# Patient Record
Sex: FEMALE | Race: WHITE | NOT HISPANIC OR LATINO | Employment: UNEMPLOYED | URBAN - METROPOLITAN AREA
[De-identification: names, ages, dates, MRNs, and addresses within clinical notes are randomized per-mention and may not be internally consistent; named-entity substitution may affect disease eponyms.]

---

## 2019-01-10 ENCOUNTER — APPOINTMENT (EMERGENCY)
Dept: RADIOLOGY | Facility: HOSPITAL | Age: 64
DRG: 871 | End: 2019-01-10
Payer: COMMERCIAL

## 2019-01-10 ENCOUNTER — APPOINTMENT (EMERGENCY)
Dept: CT IMAGING | Facility: HOSPITAL | Age: 64
DRG: 871 | End: 2019-01-10
Payer: COMMERCIAL

## 2019-01-10 ENCOUNTER — HOSPITAL ENCOUNTER (INPATIENT)
Facility: HOSPITAL | Age: 64
LOS: 6 days | Discharge: HOME | DRG: 871 | End: 2019-01-17
Attending: EMERGENCY MEDICINE | Admitting: INTERNAL MEDICINE
Payer: COMMERCIAL

## 2019-01-10 DIAGNOSIS — J18.9 MULTIFOCAL PNEUMONIA: Primary | ICD-10-CM

## 2019-01-10 DIAGNOSIS — R06.03 RESPIRATORY DISTRESS: ICD-10-CM

## 2019-01-10 DIAGNOSIS — I10 ESSENTIAL HYPERTENSION: ICD-10-CM

## 2019-01-10 DIAGNOSIS — D72.825 BANDEMIA: ICD-10-CM

## 2019-01-10 DIAGNOSIS — E87.2 LACTIC ACIDOSIS: ICD-10-CM

## 2019-01-10 LAB
ALBUMIN SERPL BCP-MCNC: 2.3 G/DL (ref 3.5–5)
ALP SERPL-CCNC: 100 U/L (ref 46–116)
ALT SERPL W P-5'-P-CCNC: 21 U/L (ref 12–78)
ANION GAP BLD CALC-SCNC: 16 MMOL/L (ref 4–13)
ANION GAP SERPL CALCULATED.3IONS-SCNC: 18 MMOL/L (ref 4–13)
ANISOCYTOSIS BLD QL SMEAR: PRESENT
APTT PPP: 27 SECONDS (ref 26–38)
AST SERPL W P-5'-P-CCNC: 20 U/L (ref 5–45)
BASE EX.OXY STD BLDV CALC-SCNC: 84.6 % (ref 60–80)
BASE EXCESS BLDV CALC-SCNC: -3.2 MMOL/L
BASOPHILS # BLD MANUAL: 0 THOUSAND/UL (ref 0–0.1)
BASOPHILS NFR MAR MANUAL: 0 % (ref 0–1)
BILIRUB SERPL-MCNC: 0.81 MG/DL (ref 0.2–1)
BUN BLD-MCNC: 42 MG/DL (ref 5–25)
BUN SERPL-MCNC: 47 MG/DL (ref 5–25)
CA-I BLD-SCNC: 1.21 MMOL/L (ref 1.12–1.32)
CALCIUM SERPL-MCNC: 10.5 MG/DL (ref 8.3–10.1)
CHLORIDE BLD-SCNC: 110 MMOL/L (ref 100–108)
CHLORIDE SERPL-SCNC: 100 MMOL/L (ref 100–108)
CO2 SERPL-SCNC: 22 MMOL/L (ref 21–32)
CREAT BLD-MCNC: 1.1 MG/DL (ref 0.6–1.3)
CREAT SERPL-MCNC: 1.41 MG/DL (ref 0.6–1.3)
EOSINOPHIL # BLD MANUAL: 0 THOUSAND/UL (ref 0–0.4)
EOSINOPHIL NFR BLD MANUAL: 0 % (ref 0–6)
ERYTHROCYTE [DISTWIDTH] IN BLOOD BY AUTOMATED COUNT: 15.1 % (ref 11.6–15.1)
GFR SERPL CREATININE-BSD FRML MDRD: 39 ML/MIN/1.73SQ M
GFR SERPL CREATININE-BSD FRML MDRD: 53 ML/MIN/1.73SQ M
GLUCOSE SERPL-MCNC: 249 MG/DL (ref 65–140)
GLUCOSE SERPL-MCNC: 259 MG/DL (ref 65–140)
HCO3 BLDV-SCNC: 21.8 MMOL/L (ref 24–30)
HCT VFR BLD AUTO: 35.5 % (ref 34.8–46.1)
HCT VFR BLD CALC: 31 % (ref 34.8–46.1)
HGB BLD-MCNC: 10.7 G/DL (ref 11.5–15.4)
HGB BLDA-MCNC: 10.5 G/DL (ref 11.5–15.4)
INR PPP: 1.25 (ref 0.86–1.17)
LACTATE SERPL-SCNC: 5.2 MMOL/L (ref 0.5–2)
LYMPHOCYTES # BLD AUTO: 0.19 THOUSAND/UL (ref 0.6–4.47)
LYMPHOCYTES # BLD AUTO: 4 % (ref 14–44)
MCH RBC QN AUTO: 32.6 PG (ref 26.8–34.3)
MCHC RBC AUTO-ENTMCNC: 30.1 G/DL (ref 31.4–37.4)
MCV RBC AUTO: 108 FL (ref 82–98)
METAMYELOCYTES NFR BLD MANUAL: 1 % (ref 0–1)
MONOCYTES # BLD AUTO: 1.11 THOUSAND/UL (ref 0–1.22)
MONOCYTES NFR BLD: 23 % (ref 4–12)
MYELOCYTES NFR BLD MANUAL: 2 % (ref 0–1)
NEUTROPHILS # BLD MANUAL: 3.37 THOUSAND/UL (ref 1.85–7.62)
NEUTS BAND NFR BLD MANUAL: 16 % (ref 0–8)
NEUTS SEG NFR BLD AUTO: 54 % (ref 43–75)
NRBC BLD AUTO-RTO: 0 /100 WBCS
NRBC BLD AUTO-RTO: 2 /100 WBC (ref 0–2)
O2 CT BLDV-SCNC: 13.3 ML/DL
PCO2 BLD: 24 MMOL/L (ref 21–32)
PCO2 BLDV: 38.9 MM HG (ref 42–50)
PH BLDV: 7.37 [PH] (ref 7.3–7.4)
PLATELET # BLD AUTO: 29 THOUSANDS/UL (ref 149–390)
PLATELET BLD QL SMEAR: ABNORMAL
PMV BLD AUTO: 11 FL (ref 8.9–12.7)
PO2 BLDV: 57 MM HG (ref 35–45)
POTASSIUM BLD-SCNC: 4.2 MMOL/L (ref 3.5–5.3)
POTASSIUM SERPL-SCNC: 4.2 MMOL/L (ref 3.5–5.3)
PROT SERPL-MCNC: 7.4 G/DL (ref 6.4–8.2)
PROTHROMBIN TIME: 15.8 SECONDS (ref 11.8–14.2)
RBC # BLD AUTO: 3.28 MILLION/UL (ref 3.81–5.12)
SODIUM BLD-SCNC: 146 MMOL/L (ref 136–145)
SODIUM SERPL-SCNC: 140 MMOL/L (ref 136–145)
SPECIMEN SOURCE: ABNORMAL
TOTAL CELLS COUNTED SPEC: 100
TOXIC GRANULES BLD QL SMEAR: PRESENT
TROPONIN I SERPL-MCNC: <0.02 NG/ML
WBC # BLD AUTO: 4.82 THOUSAND/UL (ref 4.31–10.16)

## 2019-01-10 PROCEDURE — 85007 BL SMEAR W/DIFF WBC COUNT: CPT | Performed by: EMERGENCY MEDICINE

## 2019-01-10 PROCEDURE — 83605 ASSAY OF LACTIC ACID: CPT | Performed by: EMERGENCY MEDICINE

## 2019-01-10 PROCEDURE — 81001 URINALYSIS AUTO W/SCOPE: CPT | Performed by: EMERGENCY MEDICINE

## 2019-01-10 PROCEDURE — 71275 CT ANGIOGRAPHY CHEST: CPT

## 2019-01-10 PROCEDURE — 71045 X-RAY EXAM CHEST 1 VIEW: CPT

## 2019-01-10 PROCEDURE — 36415 COLL VENOUS BLD VENIPUNCTURE: CPT

## 2019-01-10 PROCEDURE — 87040 BLOOD CULTURE FOR BACTERIA: CPT | Performed by: EMERGENCY MEDICINE

## 2019-01-10 PROCEDURE — 80047 BASIC METABLC PNL IONIZED CA: CPT

## 2019-01-10 PROCEDURE — 94660 CPAP INITIATION&MGMT: CPT

## 2019-01-10 PROCEDURE — 96361 HYDRATE IV INFUSION ADD-ON: CPT

## 2019-01-10 PROCEDURE — 84484 ASSAY OF TROPONIN QUANT: CPT | Performed by: EMERGENCY MEDICINE

## 2019-01-10 PROCEDURE — 96365 THER/PROPH/DIAG IV INF INIT: CPT

## 2019-01-10 PROCEDURE — 85610 PROTHROMBIN TIME: CPT | Performed by: EMERGENCY MEDICINE

## 2019-01-10 PROCEDURE — 80053 COMPREHEN METABOLIC PANEL: CPT | Performed by: EMERGENCY MEDICINE

## 2019-01-10 PROCEDURE — 82805 BLOOD GASES W/O2 SATURATION: CPT | Performed by: EMERGENCY MEDICINE

## 2019-01-10 PROCEDURE — 85014 HEMATOCRIT: CPT

## 2019-01-10 PROCEDURE — 85730 THROMBOPLASTIN TIME PARTIAL: CPT | Performed by: EMERGENCY MEDICINE

## 2019-01-10 PROCEDURE — 99285 EMERGENCY DEPT VISIT HI MDM: CPT

## 2019-01-10 PROCEDURE — 85027 COMPLETE CBC AUTOMATED: CPT | Performed by: EMERGENCY MEDICINE

## 2019-01-10 PROCEDURE — 94640 AIRWAY INHALATION TREATMENT: CPT

## 2019-01-10 PROCEDURE — 93005 ELECTROCARDIOGRAM TRACING: CPT

## 2019-01-10 RX ORDER — FLUTICASONE FUROATE AND VILANTEROL 100; 25 UG/1; UG/1
1 POWDER RESPIRATORY (INHALATION) DAILY
COMMUNITY

## 2019-01-10 RX ORDER — PROCHLORPERAZINE MALEATE 10 MG
10 TABLET ORAL EVERY 6 HOURS PRN
COMMUNITY
End: 2022-05-13

## 2019-01-10 RX ORDER — GABAPENTIN 300 MG/1
100 CAPSULE ORAL 2 TIMES DAILY
COMMUNITY

## 2019-01-10 RX ORDER — POTASSIUM CHLORIDE 750 MG/1
20 TABLET, EXTENDED RELEASE ORAL 2 TIMES DAILY
COMMUNITY

## 2019-01-10 RX ORDER — ACETAMINOPHEN 500 MG
500 TABLET ORAL EVERY 6 HOURS PRN
COMMUNITY

## 2019-01-10 RX ORDER — MORPHINE SULFATE 15 MG/1
15 TABLET, FILM COATED, EXTENDED RELEASE ORAL EVERY 4 HOURS PRN
Status: ON HOLD | COMMUNITY
End: 2022-05-13 | Stop reason: SDUPTHER

## 2019-01-10 RX ORDER — ASPIRIN 81 MG/1
81 TABLET, CHEWABLE ORAL DAILY
COMMUNITY
End: 2022-05-13

## 2019-01-10 RX ORDER — FLUTICASONE PROPIONATE 50 MCG
2 SPRAY, SUSPENSION (ML) NASAL DAILY
COMMUNITY

## 2019-01-10 RX ORDER — MIRTAZAPINE 15 MG/1
15 TABLET, FILM COATED ORAL
COMMUNITY
End: 2019-01-17 | Stop reason: HOSPADM

## 2019-01-10 RX ORDER — OMEPRAZOLE 20 MG/1
20 CAPSULE, DELAYED RELEASE ORAL DAILY
COMMUNITY

## 2019-01-10 RX ORDER — MORPHINE SULFATE 30 MG/1
30 TABLET ORAL 2 TIMES DAILY
COMMUNITY
End: 2019-01-17 | Stop reason: HOSPADM

## 2019-01-10 RX ORDER — MULTIVITAMIN
1 TABLET ORAL DAILY
COMMUNITY

## 2019-01-10 RX ORDER — ALBUTEROL SULFATE 2.5 MG/3ML
5 SOLUTION RESPIRATORY (INHALATION) ONCE
Status: COMPLETED | OUTPATIENT
Start: 2019-01-10 | End: 2019-01-10

## 2019-01-10 RX ORDER — LORAZEPAM 0.5 MG/1
0.5 TABLET ORAL EVERY 6 HOURS PRN
COMMUNITY
End: 2022-05-13

## 2019-01-10 RX ORDER — ACYCLOVIR 400 MG/1
400 TABLET ORAL
COMMUNITY

## 2019-01-10 RX ORDER — CHLORHEXIDINE GLUCONATE 0.12 MG/ML
15 RINSE ORAL 2 TIMES DAILY
COMMUNITY
End: 2022-05-13

## 2019-01-10 RX ORDER — ASCORBIC ACID 500 MG
500 TABLET ORAL DAILY
COMMUNITY

## 2019-01-10 RX ORDER — METOPROLOL SUCCINATE 100 MG/1
100 TABLET, EXTENDED RELEASE ORAL DAILY
COMMUNITY
End: 2019-01-17 | Stop reason: HOSPADM

## 2019-01-10 RX ADMIN — IPRATROPIUM BROMIDE 0.5 MG: 0.5 SOLUTION RESPIRATORY (INHALATION) at 22:01

## 2019-01-10 RX ADMIN — ALBUTEROL SULFATE 5 MG: 2.5 SOLUTION RESPIRATORY (INHALATION) at 22:01

## 2019-01-10 RX ADMIN — SODIUM CHLORIDE 1000 ML: 0.9 INJECTION, SOLUTION INTRAVENOUS at 22:31

## 2019-01-10 RX ADMIN — SODIUM CHLORIDE 1000 ML: 0.9 INJECTION, SOLUTION INTRAVENOUS at 22:32

## 2019-01-10 RX ADMIN — VANCOMYCIN HYDROCHLORIDE 1250 MG: 1 INJECTION, POWDER, LYOPHILIZED, FOR SOLUTION INTRAVENOUS at 23:08

## 2019-01-10 RX ADMIN — SODIUM CHLORIDE 500 ML: 0.9 INJECTION, SOLUTION INTRAVENOUS at 22:01

## 2019-01-10 RX ADMIN — IODIXANOL 85 ML: 320 INJECTION, SOLUTION INTRAVASCULAR at 22:18

## 2019-01-10 RX ADMIN — CEFEPIME HYDROCHLORIDE 2000 MG: 2 INJECTION, POWDER, FOR SOLUTION INTRAVENOUS at 22:33

## 2019-01-11 PROBLEM — E87.2 LACTIC ACIDOSIS: Status: ACTIVE | Noted: 2019-01-11

## 2019-01-11 PROBLEM — C90.00 MULTIPLE MYELOMA (HCC): Status: ACTIVE | Noted: 2019-01-11

## 2019-01-11 PROBLEM — D69.6 THROMBOCYTOPENIA (HCC): Status: ACTIVE | Noted: 2019-01-11

## 2019-01-11 PROBLEM — J18.9 MULTIFOCAL PNEUMONIA: Status: ACTIVE | Noted: 2019-01-11

## 2019-01-11 PROBLEM — J45.909 ASTHMA: Status: ACTIVE | Noted: 2019-01-11

## 2019-01-11 PROBLEM — K21.9 GERD (GASTROESOPHAGEAL REFLUX DISEASE): Status: ACTIVE | Noted: 2019-01-11

## 2019-01-11 PROBLEM — I10 HYPERTENSION: Status: ACTIVE | Noted: 2019-01-11

## 2019-01-11 LAB
ANION GAP SERPL CALCULATED.3IONS-SCNC: 10 MMOL/L (ref 4–13)
BACTERIA UR QL AUTO: ABNORMAL /HPF
BILIRUB UR QL STRIP: NEGATIVE
BUN SERPL-MCNC: 38 MG/DL (ref 5–25)
CALCIUM SERPL-MCNC: 9.3 MG/DL (ref 8.3–10.1)
CHLORIDE SERPL-SCNC: 107 MMOL/L (ref 100–108)
CLARITY UR: ABNORMAL
CO2 SERPL-SCNC: 25 MMOL/L (ref 21–32)
COLOR UR: YELLOW
CREAT SERPL-MCNC: 0.97 MG/DL (ref 0.6–1.3)
ERYTHROCYTE [DISTWIDTH] IN BLOOD BY AUTOMATED COUNT: 15.1 % (ref 11.6–15.1)
FLUAV AG SPEC QL IA: NEGATIVE
FLUAV AG SPEC QL: NORMAL
FLUBV AG SPEC QL IA: NEGATIVE
FLUBV AG SPEC QL: NORMAL
GFR SERPL CREATININE-BSD FRML MDRD: 62 ML/MIN/1.73SQ M
GLUCOSE SERPL-MCNC: 165 MG/DL (ref 65–140)
GLUCOSE UR STRIP-MCNC: NEGATIVE MG/DL
HCT VFR BLD AUTO: 29.2 % (ref 34.8–46.1)
HGB BLD-MCNC: 8.8 G/DL (ref 11.5–15.4)
HGB UR QL STRIP.AUTO: ABNORMAL
KETONES UR STRIP-MCNC: ABNORMAL MG/DL
LACTATE SERPL-SCNC: 1.5 MMOL/L (ref 0.5–2)
LEUKOCYTE ESTERASE UR QL STRIP: NEGATIVE
MCH RBC QN AUTO: 33 PG (ref 26.8–34.3)
MCHC RBC AUTO-ENTMCNC: 30.1 G/DL (ref 31.4–37.4)
MCV RBC AUTO: 109 FL (ref 82–98)
NITRITE UR QL STRIP: NEGATIVE
NON-SQ EPI CELLS URNS QL MICRO: ABNORMAL /HPF
PH UR STRIP.AUTO: 5.5 [PH] (ref 4.5–8)
PLATELET # BLD AUTO: 21 THOUSANDS/UL (ref 149–390)
POTASSIUM SERPL-SCNC: 3.9 MMOL/L (ref 3.5–5.3)
PROT UR STRIP-MCNC: ABNORMAL MG/DL
RBC # BLD AUTO: 2.67 MILLION/UL (ref 3.81–5.12)
RBC #/AREA URNS AUTO: ABNORMAL /HPF
RSV B RNA SPEC QL NAA+PROBE: NORMAL
SODIUM SERPL-SCNC: 142 MMOL/L (ref 136–145)
SP GR UR STRIP.AUTO: 1.02 (ref 1–1.03)
UROBILINOGEN UR QL STRIP.AUTO: 0.2 E.U./DL
WBC # BLD AUTO: 5.76 THOUSAND/UL (ref 4.31–10.16)
WBC #/AREA URNS AUTO: ABNORMAL /HPF

## 2019-01-11 PROCEDURE — 87631 RESP VIRUS 3-5 TARGETS: CPT | Performed by: EMERGENCY MEDICINE

## 2019-01-11 PROCEDURE — 99223 1ST HOSP IP/OBS HIGH 75: CPT | Performed by: INTERNAL MEDICINE

## 2019-01-11 PROCEDURE — 80048 BASIC METABOLIC PNL TOTAL CA: CPT | Performed by: PHYSICIAN ASSISTANT

## 2019-01-11 PROCEDURE — 85027 COMPLETE CBC AUTOMATED: CPT | Performed by: PHYSICIAN ASSISTANT

## 2019-01-11 RX ORDER — METOPROLOL SUCCINATE 100 MG/1
100 TABLET, EXTENDED RELEASE ORAL DAILY
Status: DISCONTINUED | OUTPATIENT
Start: 2019-01-11 | End: 2019-01-17

## 2019-01-11 RX ORDER — GABAPENTIN 300 MG/1
300 CAPSULE ORAL DAILY
Status: DISCONTINUED | OUTPATIENT
Start: 2019-01-11 | End: 2019-01-17 | Stop reason: HOSPADM

## 2019-01-11 RX ORDER — ALBUTEROL SULFATE 2.5 MG/3ML
2.5 SOLUTION RESPIRATORY (INHALATION) EVERY 6 HOURS PRN
Status: DISCONTINUED | OUTPATIENT
Start: 2019-01-11 | End: 2019-01-17 | Stop reason: HOSPADM

## 2019-01-11 RX ORDER — FLUTICASONE PROPIONATE 50 MCG
2 SPRAY, SUSPENSION (ML) NASAL DAILY
Status: DISCONTINUED | OUTPATIENT
Start: 2019-01-11 | End: 2019-01-17 | Stop reason: HOSPADM

## 2019-01-11 RX ORDER — MORPHINE SULFATE 30 MG/1
30 TABLET, FILM COATED, EXTENDED RELEASE ORAL 2 TIMES DAILY
Status: DISCONTINUED | OUTPATIENT
Start: 2019-01-11 | End: 2019-01-12

## 2019-01-11 RX ORDER — LORAZEPAM 0.5 MG/1
0.5 TABLET ORAL EVERY 6 HOURS PRN
Status: DISCONTINUED | OUTPATIENT
Start: 2019-01-11 | End: 2019-01-12

## 2019-01-11 RX ORDER — ACETAMINOPHEN 325 MG/1
650 TABLET ORAL EVERY 6 HOURS PRN
Status: DISCONTINUED | OUTPATIENT
Start: 2019-01-11 | End: 2019-01-17 | Stop reason: HOSPADM

## 2019-01-11 RX ORDER — SENNOSIDES 8.6 MG
45 TABLET ORAL DAILY
Status: DISCONTINUED | OUTPATIENT
Start: 2019-01-11 | End: 2019-01-17 | Stop reason: HOSPADM

## 2019-01-11 RX ORDER — ASPIRIN 81 MG/1
81 TABLET, CHEWABLE ORAL DAILY
Status: DISCONTINUED | OUTPATIENT
Start: 2019-01-11 | End: 2019-01-15

## 2019-01-11 RX ORDER — MIRTAZAPINE 15 MG/1
15 TABLET, FILM COATED ORAL
Status: DISCONTINUED | OUTPATIENT
Start: 2019-01-11 | End: 2019-01-12

## 2019-01-11 RX ORDER — PANTOPRAZOLE SODIUM 40 MG/1
40 TABLET, DELAYED RELEASE ORAL
Status: DISCONTINUED | OUTPATIENT
Start: 2019-01-11 | End: 2019-01-17 | Stop reason: HOSPADM

## 2019-01-11 RX ORDER — ONDANSETRON 2 MG/ML
4 INJECTION INTRAMUSCULAR; INTRAVENOUS EVERY 6 HOURS PRN
Status: DISCONTINUED | OUTPATIENT
Start: 2019-01-11 | End: 2019-01-17 | Stop reason: HOSPADM

## 2019-01-11 RX ORDER — SODIUM CHLORIDE 9 MG/ML
50 INJECTION, SOLUTION INTRAVENOUS CONTINUOUS
Status: DISCONTINUED | OUTPATIENT
Start: 2019-01-11 | End: 2019-01-15

## 2019-01-11 RX ORDER — MORPHINE SULFATE 15 MG/1
15 TABLET ORAL EVERY 4 HOURS PRN
Status: DISCONTINUED | OUTPATIENT
Start: 2019-01-11 | End: 2019-01-12

## 2019-01-11 RX ORDER — ACYCLOVIR 200 MG/1
400 CAPSULE ORAL DAILY
Status: DISCONTINUED | OUTPATIENT
Start: 2019-01-11 | End: 2019-01-17 | Stop reason: HOSPADM

## 2019-01-11 RX ORDER — AMLODIPINE BESYLATE 2.5 MG/1
2.5 TABLET ORAL DAILY
Status: DISCONTINUED | OUTPATIENT
Start: 2019-01-11 | End: 2019-01-17

## 2019-01-11 RX ORDER — POTASSIUM CHLORIDE 20 MEQ/1
20 TABLET, EXTENDED RELEASE ORAL 2 TIMES DAILY
Status: DISCONTINUED | OUTPATIENT
Start: 2019-01-11 | End: 2019-01-14

## 2019-01-11 RX ORDER — CHLORHEXIDINE GLUCONATE 0.12 MG/ML
15 RINSE ORAL 2 TIMES DAILY
Status: DISCONTINUED | OUTPATIENT
Start: 2019-01-11 | End: 2019-01-17 | Stop reason: HOSPADM

## 2019-01-11 RX ADMIN — CEFEPIME HYDROCHLORIDE 2000 MG: 2 INJECTION, POWDER, FOR SOLUTION INTRAVENOUS at 11:05

## 2019-01-11 RX ADMIN — ACYCLOVIR 400 MG: 200 CAPSULE ORAL at 08:52

## 2019-01-11 RX ADMIN — PANTOPRAZOLE SODIUM 40 MG: 40 TABLET, DELAYED RELEASE ORAL at 05:18

## 2019-01-11 RX ADMIN — SODIUM CHLORIDE 100 ML/HR: 0.9 INJECTION, SOLUTION INTRAVENOUS at 03:03

## 2019-01-11 RX ADMIN — AMLODIPINE BESYLATE 2.5 MG: 2.5 TABLET ORAL at 08:52

## 2019-01-11 RX ADMIN — SODIUM CHLORIDE 100 ML/HR: 0.9 INJECTION, SOLUTION INTRAVENOUS at 21:59

## 2019-01-11 RX ADMIN — ACETAMINOPHEN 650 MG: 325 TABLET, FILM COATED ORAL at 05:18

## 2019-01-11 RX ADMIN — MORPHINE SULFATE 15 MG: 15 TABLET ORAL at 03:10

## 2019-01-11 RX ADMIN — METOPROLOL SUCCINATE 100 MG: 100 TABLET, EXTENDED RELEASE ORAL at 08:53

## 2019-01-11 RX ADMIN — CHLORHEXIDINE GLUCONATE 0.12% ORAL RINSE 15 ML: 1.2 LIQUID ORAL at 18:00

## 2019-01-11 RX ADMIN — MORPHINE SULFATE 30 MG: 30 TABLET, EXTENDED RELEASE ORAL at 18:00

## 2019-01-11 RX ADMIN — GABAPENTIN 300 MG: 300 CAPSULE ORAL at 08:52

## 2019-01-11 RX ADMIN — CHLORHEXIDINE GLUCONATE 0.12% ORAL RINSE 15 ML: 1.2 LIQUID ORAL at 08:53

## 2019-01-11 RX ADMIN — CEFEPIME HYDROCHLORIDE 2000 MG: 2 INJECTION, POWDER, FOR SOLUTION INTRAVENOUS at 23:27

## 2019-01-11 RX ADMIN — MORPHINE SULFATE 30 MG: 30 TABLET, EXTENDED RELEASE ORAL at 08:51

## 2019-01-11 RX ADMIN — MORPHINE SULFATE 15 MG: 15 TABLET ORAL at 13:04

## 2019-01-11 NOTE — ED PROCEDURE NOTE
PROCEDURE  CriticalCare Time  Performed by: Nati Lindquist by: Brynn Busby     Critical care provider statement:     Critical care time (minutes):  35    Critical care time was exclusive of:  Separately billable procedures and treating other patients and teaching time    Critical care was necessary to treat or prevent imminent or life-threatening deterioration of the following conditions:  Respiratory failure, cardiac failure and circulatory failure    Critical care was time spent personally by me on the following activities:  Blood draw for specimens, obtaining history from patient or surrogate, development of treatment plan with patient or surrogate, discussions with consultants, evaluation of patient's response to treatment, examination of patient, interpretation of cardiac output measurements, ordering and performing treatments and interventions, ordering and review of laboratory studies, ordering and review of radiographic studies, re-evaluation of patient's condition and review of silas Escobedo MD  01/10/19 5396

## 2019-01-11 NOTE — ED PROVIDER NOTES
History  Chief Complaint   Patient presents with    Shortness of Breath     Pt c/o SOB and then collapsed at home  EMS stated O2 was 62% on arrival  Pt presented with tachypnea on CPAP     Patient comes in apparently had shortness of breath; collapse outside of the home was found to be hypoxic according to the pre-hospital personnel to the 60s  On arrival saturation was in the 70s  Came in on CPAP immediately transitioned to BiPAP  Was noted that patient was having fevers; she denies this  Here she denies any history of CHF for COPD although she was a smoker  Currently undergoing treatment for multiple myeloma it appears most records are through [facility]  She denies any abdominal pain; she can move all extremities nonfocally  Patient answer questions with short sentences and also gives thumbs up and down  No evidence of rash patient is tachypneic however saturation improved on BiPAP  Patient is not on any anticoagulation  Prior to Admission Medications   Prescriptions Last Dose Informant Patient Reported? Taking?    BIOTIN PO Not Taking at Unknown time  Yes No   Sig: Take 5,000 mcg by mouth 2 (two) times a day   Bioflavonoid Products (BIOFLEX PO) Not Taking at Unknown time  Yes No   Sig: Take 1 tablet by mouth daily   CALCIUM CARBONATE-VIT D-MIN PO Not Taking at Unknown time  Yes No   Sig: Take 1 tablet by mouth daily   Calcium Carb-Cholecalciferol (OS-ANTONIETA PO) Not Taking at Unknown time  Yes No   Sig: Take 1 tablet by mouth daily   Dexamethasone (DECADRON PO) Past Week at Unknown time  Yes Yes   Sig: Take by mouth   LORazepam (ATIVAN) 0 5 mg tablet Not Taking at Unknown time  Yes No   Sig: Take 0 5 mg by mouth every 6 (six) hours as needed for anxiety   Multiple Vitamin (MULTIVITAMIN) tablet 1/10/2019 at Unknown time  Yes Yes   Sig: Take 1 tablet by mouth daily   SENNOSIDES PO   Yes Yes   Sig: Take 45 mg by mouth daily   acetaminophen (TYLENOL) 500 mg tablet 1/10/2019 at Unknown time  Yes Yes   Sig: Take 500 mg by mouth every 6 (six) hours as needed for mild pain   acyclovir (ZOVIRAX) 400 MG tablet 1/10/2019 at Unknown time  Yes Yes   Sig: Take 400 mg by mouth every 4 (four) hours while awake   ascorbic acid (VITAMIN C) 500 mg tablet 1/10/2019 at Unknown time  Yes Yes   Sig: Take 500 mg by mouth daily   aspirin 81 mg chewable tablet 1/10/2019 at Unknown time  Yes Yes   Sig: Chew 81 mg daily   chlorhexidine (PERIDEX) 0 12 % solution Not Taking at Unknown time  Yes No   Sig: Apply 15 mL to the mouth or throat 2 (two) times a day   fluticasone (FLONASE) 50 mcg/act nasal spray Unknown at Unknown time  Yes No   Si sprays into each nostril daily   fluticasone-vilanterol (BREO ELLIPTA) 100-25 mcg/inh inhaler   Yes Yes   Sig: Inhale 1 puff daily Rinse mouth after use    gabapentin (NEURONTIN) 300 mg capsule 1/10/2019 at Unknown time  Yes Yes   Sig: Take 100 mg by mouth 2 (two) times a day   ipratropium (ATROVENT) 0 02 % nebulizer solution Not Taking at Unknown time  Yes No   Sig: Take 0 25 mg by nebulization 2 (two) times a day   metoprolol succinate (TOPROL-XL) 100 mg 24 hr tablet 1/10/2019 at Unknown time  Yes Yes   Sig: Take 100 mg by mouth daily   mirtazapine (REMERON) 15 mg tablet Not Taking at Unknown time  Yes No   Sig: Take 15 mg by mouth daily at bedtime   morphine (MS CONTIN) 15 mg 12 hr tablet 1/10/2019 at Unknown time  Yes Yes   Sig: Take 15 mg by mouth every 4 (four) hours as needed for severe pain   morphine (MSIR) 30 MG tablet 1/10/2019 at Unknown time  Yes Yes   Sig: Take 30 mg by mouth 2 (two) times a day   omeprazole (PriLOSEC) 20 mg delayed release capsule as needed  Yes Yes   Sig: Take 20 mg by mouth daily   potassium chloride (K-DUR,KLOR-CON) 10 mEq tablet More than a month at Unknown time  Yes No   Sig: Take 20 mEq by mouth 2 (two) times a day   prochlorperazine (COMPAZINE) 10 mg tablet Past Month at Unknown time  Yes Yes   Sig: Take 10 mg by mouth every 6 (six) hours as needed for nausea or vomiting      Facility-Administered Medications: None       Past Medical History:   Diagnosis Date    Anemia     Asthma     Chronic constipation     Chronic pain     COPD (chronic obstructive pulmonary disease) (HCC)     CTS (carpal tunnel syndrome)     DJD (degenerative joint disease)     GERD (gastroesophageal reflux disease)     Hypertension     Multiple myeloma (HCC)     SAMIR (obstructive sleep apnea)     Osteonecrosis ([address] )     Sciatica     Thoracic aortic aneurysm (HCC)        Past Surgical History:   Procedure Laterality Date    BONE MARROW BIOPSY      COLONOSCOPY      PORTACATH PLACEMENT      REDUCTION MAMMAPLASTY      VERTEBROPLASTY         History reviewed  No pertinent family history  I have reviewed and agree with the history as documented  Social History   Substance Use Topics    Smoking status: Former Smoker    Smokeless tobacco: Never Used    Alcohol use No        Review of Systems   Unable to perform ROS: Acuity of condition   Constitutional: Positive for fever  Reported by pre-hospital personnel fevers   Respiratory: Positive for cough and shortness of breath  Cardiovascular: Negative for chest pain  Gastrointestinal: Negative for abdominal pain, nausea and vomiting  Psychiatric/Behavioral: Positive for confusion         Physical Exam  ED Triage Vitals   Temperature Pulse Respirations Blood Pressure SpO2   01/10/19 2346 01/10/19 2147 01/10/19 2147 01/10/19 2147 01/10/19 2147   98 6 °F (37 °C) (!) 150 (!) 38 144/81 (!) 85 %      Temp Source Heart Rate Source Patient Position - Orthostatic VS BP Location FiO2 (%)   01/10/19 2349 01/10/19 2147 01/10/19 2147 01/10/19 2147 --   Oral Monitor Sitting Right arm       Pain Score       01/10/19 2228       9           Orthostatic Vital Signs  Vitals:    01/11/19 0407 01/11/19 0707 01/11/19 1454 01/11/19 2329   BP:  113/72 114/68 136/72   Pulse: 97 64 100 96   Patient Position - Orthostatic VS:  Lying Sitting Sitting       Physical Exam   Constitutional: She is oriented to person, place, and time  She appears well-developed  She appears distressed  Tachypneic leaning forward on BiPAP   HENT:   Head: Normocephalic and atraumatic  Mouth/Throat: Oropharynx is clear and moist  No oropharyngeal exudate  Eyes: Pupils are equal, round, and reactive to light  EOM are normal  Right eye exhibits no discharge  Left eye exhibits no discharge  Neck: Normal range of motion  Neck supple  No tracheal deviation present  No thyromegaly present  Cardiovascular: Normal heart sounds and intact distal pulses  No murmur heard  Tachycardia   Pulmonary/Chest: Effort normal  No respiratory distress  She has wheezes  She has no rales  Good air movement bilateral faint wheezing   Abdominal: Soft  Bowel sounds are normal  She exhibits no distension  There is no tenderness  There is no rebound and no guarding  Soft without tenderness   Musculoskeletal: Normal range of motion  She exhibits no edema, tenderness or deformity  Lymphadenopathy:     She has no cervical adenopathy  Neurological: She is alert and oriented to person, place, and time  She exhibits normal muscle tone  Skin: Skin is warm  Capillary refill takes less than 2 seconds  No rash noted  No erythema  Psychiatric: She has a normal mood and affect   Her behavior is normal  Judgment and thought content normal        ED Medications  Medications   metoprolol succinate (TOPROL-XL) 24 hr tablet 100 mg (100 mg Oral Given 1/11/19 0853)   morphine (MS CONTIN) ER tablet 30 mg (30 mg Oral Given 1/11/19 1800)   morphine (MSIR) IR tablet 15 mg (15 mg Oral Given 1/11/19 1304)   pantoprazole (PROTONIX) EC tablet 40 mg (40 mg Oral Given 1/11/19 0518)   aspirin chewable tablet 81 mg (81 mg Oral Not Given 1/11/19 0851)   chlorhexidine (PERIDEX) 0 12 % oral rinse 15 mL (15 mL Mouth/Throat Given 1/11/19 1800)   albuterol inhalation solution 2 5 mg (not administered)   cefepime (MAXIPIME) 2,000 mg in dextrose 5 % 50 mL IVPB (0 mg Intravenous Stopped 1/11/19 2357)   fluticasone (FLONASE) 50 mcg/act nasal spray 2 spray (2 sprays Nasal Not Given 1/11/19 0853)   LORazepam (ATIVAN) tablet 0 5 mg (not administered)   mirtazapine (REMERON) tablet 15 mg (not administered)   potassium chloride (K-DUR,KLOR-CON) CR tablet 20 mEq (20 mEq Oral Not Given 1/11/19 1801)   senna (SENOKOT) tablet 43 mg (43 mg Oral Not Given 1/11/19 0851)   acyclovir (ZOVIRAX) capsule 400 mg (400 mg Oral Given 1/11/19 0852)   gabapentin (NEURONTIN) capsule 300 mg (300 mg Oral Given 1/11/19 0852)   amLODIPine (NORVASC) tablet 2 5 mg (2 5 mg Oral Given 1/11/19 0852)   sodium chloride 0 9 % infusion (100 mL/hr Intravenous New Bag 1/11/19 2159)   ondansetron (ZOFRAN) injection 4 mg (not administered)   acetaminophen (TYLENOL) tablet 650 mg (650 mg Oral Given 1/11/19 0518)   vancomycin (VANCOCIN) 1,250 mg in sodium chloride 0 9 % 250 mL IVPB (1,250 mg Intravenous New Bag 1/12/19 0006)   sodium chloride 0 9 % bolus 500 mL (0 mL Intravenous Stopped 1/10/19 2235)   albuterol inhalation solution 5 mg (5 mg Nebulization Given 1/10/19 2201)   ipratropium (ATROVENT) 0 02 % inhalation solution 0 5 mg (0 5 mg Nebulization Given 1/10/19 2201)   iodixanol (VISIPAQUE) 320 MG/ML injection 85 mL (85 mL Intravenous Given 1/10/19 2218)   sodium chloride 0 9 % bolus 1,000 mL (0 mL Intravenous Stopped 1/11/19 0018)   sodium chloride 0 9 % bolus 1,000 mL (1,000 mL Intravenous New Bag 1/10/19 2232)   cefepime (MAXIPIME) 2 g/50 mL dextrose IVPB (0 mg Intravenous Stopped 1/10/19 2300)   vancomycin (VANCOCIN) 1,250 mg in sodium chloride 0 9 % 250 mL IVPB (1,250 mg Intravenous New Bag 1/10/19 5657)       Diagnostic Studies  Results Reviewed     Procedure Component Value Units Date/Time    INFLUENZA A/B AND RSV, PCR [[id number]]  (Normal) Collected:  01/11/19 0013    Lab Status:  Final result Specimen:  Nasopharyngeal from Nasopharyngeal Swab Updated: 01/11/19 0942     INFLU A PCR None Detected     INFLU B PCR None Detected     RSV PCR None Detected    Rapid Influenza Screen with Reflex PCR [[id number]]  (Normal) Collected:  01/11/19 0013    Lab Status:  Final result Specimen:  Nasopharyngeal from Nasopharyngeal Swab Updated:  01/11/19 0104     Rapid Influenza A Ag Negative     Rapid Influenza B Ag Negative    Urine Microscopic [[id number]]  (Abnormal) Collected:  01/10/19 2352    Lab Status:  Final result Specimen:  Urine from Urine, Other Updated:  01/11/19 0054     RBC, UA 4-10 (A) /hpf      WBC, UA 0-1 (A) /hpf      Epithelial Cells Occasional /hpf      Bacteria, UA Moderate (A) /hpf     Lactic Acid x2 [[id number]]  (Normal) Collected:  01/10/19 2352    Lab Status:  Final result Specimen:  Blood from Arm, Right Updated:  01/11/19 0031     LACTIC ACID 1 5 mmol/L     Narrative:         Result may be elevated if tourniquet was used during collection      UA w Reflex to Microscopic w Reflex to Culture [[id number]]  (Abnormal) Collected:  01/10/19 2352    Lab Status:  Final result Specimen:  Urine from Urine, Other Updated:  01/11/19 0010     Color, UA Yellow     Clarity, UA Slightly Cloudy     Specific [place], UA 1 020     pH, UA 5 5     Leukocytes, UA Negative     Nitrite, UA Negative     Protein,  (2+) (A) mg/dl      Glucose, UA Negative mg/dl      Ketones, UA Trace (A) mg/dl      Urobilinogen, UA 0 2 E U /dl      Bilirubin, UA Negative     Blood, UA Moderate (A)    CBC and differential [[id number]]  (Abnormal) Collected:  01/10/19 2151    Lab Status:  Final result Specimen:  Blood from Arm, Right Updated:  01/10/19 2244     WBC 4 82 Thousand/uL      RBC 3 28 (L) Million/uL      Hemoglobin 10 7 (L) g/dL      Hematocrit 35 5 %       (H) fL      MCH 32 6 pg      MCHC 30 1 (L) g/dL      RDW 15 1 %      MPV 11 0 fL      Platelets 29 (LL) Thousands/uL      nRBC 0 /100 WBCs     Lactic Acid x2 [[id number]]  (Abnormal) Collected:  01/10/19 2151    Lab Status: Final result Specimen:  Blood from Arm, Right Updated:  01/10/19 2225     LACTIC ACID 5 2 (HH) mmol/L     Narrative:         Result may be elevated if tourniquet was used during collection  APTT [[id number]]  (Normal) Collected:  01/10/19 2152    Lab Status:  Final result Specimen:  Blood from Arm, Right Updated:  01/10/19 2224     PTT 27 seconds     Protime-INR [[id number]]  (Abnormal) Collected:  01/10/19 2152    Lab Status:  Final result Specimen:  Blood from Arm, Right Updated:  01/10/19 2224     Protime 15 8 (H) seconds      INR 1 25 (H)    Comprehensive metabolic panel [[id number]]  (Abnormal) Collected:  01/10/19 2151    Lab Status:  Final result Specimen:  Blood from Arm, Right Updated:  01/10/19 2222     Sodium 140 mmol/L      Potassium 4 2 mmol/L      Chloride 100 mmol/L      CO2 22 mmol/L      ANION GAP 18 (H) mmol/L      BUN 47 (H) mg/dL      Creatinine 1 41 (H) mg/dL      Glucose 249 (H) mg/dL      Calcium 10 5 (H) mg/dL      AST 20 U/L      ALT 21 U/L      Alkaline Phosphatase 100 U/L      Total Protein 7 4 g/dL      Albumin 2 3 (L) g/dL      Total Bilirubin 0 81 mg/dL      eGFR 39 ml/min/1 73sq m     Narrative:         National Kidney Disease Education Program recommendations are as follows:  GFR calculation is accurate only with a steady state creatinine  Chronic Kidney disease less than 60 ml/min/1 73 sq  meters  Kidney failure less than 15 ml/min/1 73 sq  meters      Troponin I [[id number]]  (Normal) Collected:  01/10/19 2151    Lab Status:  Final result Specimen:  Blood from Arm, Right Updated:  01/10/19 2221     Troponin I <0 02 ng/mL     Blood gas, venous [[id number]]  (Abnormal) Collected:  01/10/19 2210    Lab Status:  Final result Specimen:  Blood from Arm, Right Updated:  01/10/19 2217     pH, Merlin 7 366     pCO2, Merlin 38 9 (L) mm Hg      pO2, Merlin 57 0 (H) mm Hg      HCO3, Merlin 21 8 (L) mmol/L      Base Excess, Merlin -3 2 mmol/L      O2 Content, Merlin 13 3 ml/dL      O2 HGB, VENOUS 84 6 (H) % Blood culture #1 [[id number]] Collected:  01/10/19 2210    Lab Status: In process Specimen:  Blood from Arm, Right Updated:  01/10/19 2213    POCT Chem 8+ [[id number]]  (Abnormal) Collected:  01/10/19 2159    Lab Status:  Final result Updated:  01/10/19 2203     SODIUM, I-STAT 146 (H) mmol/l      Potassium, i-STAT 4 2 mmol/L      Chloride, istat 110 (H) mmol/L      CO2, i-STAT 24 mmol/L      Anion Gap, i-STAT 16 (H) mmol/L      Calcium, Ionized i-STAT 1 21 mmol/L      BUN, I-STAT 42 (H) mg/dl      Creatinine, i-STAT 1 1 mg/dl      eGFR 53 ml/min/1 73sq m      Glucose, i-STAT 259 (H) mg/dl      Hct, i-STAT 31 (L) %      Hgb, i-STAT 10 5 (L) g/dl      Specimen Type VENOUS    Blood culture #2 [[id number]] Collected:  01/10/19 2152    Lab Status: In process Specimen:  Blood from Arm, Right Updated:  01/10/19 2158                 X-ray chest 1 view portable   Final Result by Carolina Peguero MD (01/11 7067)      1  Left basilar opacity and patchy airspace opacity throughout the right lung suspicious for pneumonia  2   Emphysema  Workstation performed: [id number]         CTA ED chest PE study   Final Result by Thai Zepeda MD (01/10 0805)      Multifocal pneumonia, as described above  Please see discussion  Follow-up after treatment to ensure complete radiographic resolution is recommended  Moderate emphysema  No evidence of pulmonary embolism is seen  There is rather extensive osseous abnormality, as described above, likely related to patient's history of multiple myeloma, worse compared with 2009               I personally discussed this study with CHLOE OLIVER on 1/10/2019 at 11:33 PM                            Workstation performed: [id number]               Procedures  Procedures      Phone Consults  ED Phone Contact    ED Course  ED Course as of Calixto 12 0059   Thu Calixto 10, 2019   2204 Ekg interpretation: sinus tachycardia normal axis; no ischemia apparent although rate > 150 2228 LACTIC ACID: (!!) 5 2   2244 Platelet count 41,138  Most recently around 40-60,000  Frequently gets transfusions per chart  Platelet Count: (!!) 29   2246 Bands Relative: (!) 16   2318 Patient was transitioned off of BiPAP feeling well talkative a in nearly full sentences without stopping for inspiration    Fri Jan 11, 2019   Delia Aleman Now on 4L                          Initial Sepsis Screening     Row Name 01/10/19 2246 01/10/19 2227             Is the patient's history suggestive of a new or worsening infection?  -- (!)  Yes (Proceed)  -DS       Suspected source of infection  -- pneumonia  -DS       Are two or more of the following signs & symptoms of infection both present and new to the patient?  -- (!)  Yes (Proceed)  -DS       Indicate SIRS criteria Altered mental status; Tachycardia > 90 bpm;WBC > 10% bands; Tachypnea > 20 resp per min  -DS Altered mental status; Tachycardia > 90 bpm;Tachypnea > 20 resp per min  -DS       If the answer is yes to both questions, suspicion of sepsis is present  --  --       If severe sepsis is present AND tissue hypoperfusion perists in the hour after fluid resuscitation or lactate > 4, the patient meets criteria for SEPTIC SHOCK  --  --       Are any of the following organ dysfunction criteria present within 6 hours of suspected infection and SIRS criteria that are NOT considered to be chronic conditions?  -- (!)  Yes  -DS       Organ dysfunction  -- Lactate > 2 0 mmol/L  -DS       Date of presentation of severe sepsis  -- 01/10/19  -DS       Time of presentation of severe sepsis  -- 2228  -DS       Tissue hypoperfusion persists in the hour after crystalloid fluid administration, evidenced, by either:  -- * OR * Lactate level is greater to or equal 4 mmol/dL ( ___ mmol/dL in comment field)  -DS       Was hypotension present within one hour of the conclusion of crystalloid fluid administration?  -- Yes  -DS       Date of presentation of septic shock  -- 01/10/19  -DS       Time of presentation of septic shock  -- 2228  -DS         User Key  (r) = Recorded By, (t) = Taken By, (c) = Cosigned By    [address] Name Provider Type    KY Sanchez DO Resident           Default Flowsheet Data (last 720 hours)      Sepsis Reassess     Row Name 01/10/19 2320                   Repeat Volume Status and Tissue Perfusion Assessment Performed    Repeat Volume Status and Tissue Perfusion Assessment Performed Yes  -DS           Volume Status and Tissue Perfusion Post Fluid Resuscitation * Must Document All *    Vital Signs Reviewed (HR, RR, BP, T)  --        Shock Index Reviewed  --        Arterial Oxygen Saturation Reviewed (POx, SaO2 or SpO2)  --        Cardio (!)  Tachycardia   Tachycardia but improved to the 110-120 range  -DS        Pulmonary (!)  Tachypnea  -DS        Capillary Refill Brisk  -DS        Peripheral Pulses Radial;Dorsalis Pedis  -DS        Peripheral Pulse +2  -DS        Dorsalis Pedis +1  -DS        Skin Dry; Warm  -DS        Urine output assessed --   Urinating at this time  -DS           *OR*   Intensive Monitoring- Must Document One of the Following Four *:    Vital Signs Reviewed  --        * Central Venous Pressure (CVP or RAP)  --        * Central Venous Oxygen (SVO2, ScvO2 or Oxygen saturation via central catheter)  --        * Bedside Cardiovascular US in IVC diameter and % collapse  --        * Passive Leg Raise OR Crystalloid Challenge  --          User Key  (r) = Recorded By, (t) = Taken By, (c) = Cosigned By    Initials Name Provider Type    KY Sanchez DO Resident                MDM  Number of Diagnoses or Management Options  Bandemia:   Lactic acidosis:   Multifocal pneumonia:   Respiratory distress:   Diagnosis management comments: Patient came in respiratory distress  Found to have multifocal pneumonia  Patient met SIRS criteria with a source and a lactic acid greater than 4 meeting criteria for septic shock    Given fluid bolus and broad-spectrum antibiotics to cover Hcap given medical history  Patient became more hemodynamically stable patient transitioned to nasal cannula with adequate oxygen saturation and work of breathing  Patient admitted to Medicine      [facility]    Disposition  Final diagnoses:   Multifocal pneumonia   Lactic acidosis   Bandemia   Respiratory distress     Time reflects when diagnosis was documented in both MDM as applicable and the Disposition within this note     Time User Action Codes Description Comment    1/11/2019 12:03 AM Gilma Freeze Add [J18 9] Multifocal pneumonia     1/11/2019 12:03 AM Gilma Freeze Add [E87 2] Lactic acidosis     1/11/2019 12:03 AM Gilma Freeze Add [D72 825] Bandemia     1/11/2019 12:03 AM Gilma Freeze Add [R06 03] Respiratory distress       ED Disposition     ED Disposition Condition Comment    Admit  Case was discussed with KATHE and the patient's admission status was agreed to be Admission Status: inpatient status to the service of Dr Zohra Rey    None         Current Discharge Medication List      CONTINUE these medications which have NOT CHANGED    Details   acetaminophen (TYLENOL) 500 mg tablet Take 500 mg by mouth every 6 (six) hours as needed for mild pain      acyclovir (ZOVIRAX) 400 MG tablet Take 400 mg by mouth every 4 (four) hours while awake      ascorbic acid (VITAMIN C) 500 mg tablet Take 500 mg by mouth daily      aspirin 81 mg chewable tablet Chew 81 mg daily      Dexamethasone (DECADRON PO) Take by mouth      fluticasone-vilanterol (BREO ELLIPTA) 100-25 mcg/inh inhaler Inhale 1 puff daily Rinse mouth after use       gabapentin (NEURONTIN) 300 mg capsule Take 100 mg by mouth 2 (two) times a day      metoprolol succinate (TOPROL-XL) 100 mg 24 hr tablet Take 100 mg by mouth daily      morphine (MS CONTIN) 15 mg 12 hr tablet Take 15 mg by mouth every 4 (four) hours as needed for severe pain      morphine (MSIR) 30 MG tablet Take 30 mg by mouth 2 (two) times a day Multiple Vitamin (MULTIVITAMIN) tablet Take 1 tablet by mouth daily      omeprazole (PriLOSEC) 20 mg delayed release capsule Take 20 mg by mouth daily      prochlorperazine (COMPAZINE) 10 mg tablet Take 10 mg by mouth every 6 (six) hours as needed for nausea or vomiting      SENNOSIDES PO Take 45 mg by mouth daily      Bioflavonoid Products (BIOFLEX PO) Take 1 tablet by mouth daily      BIOTIN PO Take 5,000 mcg by mouth 2 (two) times a day      Calcium Carb-Cholecalciferol (OS-ANTONIETA PO) Take 1 tablet by mouth daily      CALCIUM CARBONATE-VIT D-MIN PO Take 1 tablet by mouth daily      chlorhexidine (PERIDEX) 0 12 % solution Apply 15 mL to the mouth or throat 2 (two) times a day      fluticasone (FLONASE) 50 mcg/act nasal spray 2 sprays into each nostril daily      ipratropium (ATROVENT) 0 02 % nebulizer solution Take 0 25 mg by nebulization 2 (two) times a day      LORazepam (ATIVAN) 0 5 mg tablet Take 0 5 mg by mouth every 6 (six) hours as needed for anxiety      mirtazapine (REMERON) 15 mg tablet Take 15 mg by mouth daily at bedtime      potassium chloride (K-DUR,KLOR-CON) 10 mEq tablet Take 20 mEq by mouth 2 (two) times a day           No discharge procedures on file  ED Provider  Attending physically available and evaluated Artur Moreira  DAE managed the patient along with the ED Attending      Electronically Signed by         Jessica Rodriguez DO  01/12/19 6606

## 2019-01-11 NOTE — H&P
History and Physical - [facility]    Patient Information: Rishabh Villafana 58 y o  female MRN: [id number]  Unit/Bed#: [facility] 2 [facility] 87 [id number] Encounter: [id number]  Admitting Physician: Suhas Miner PA-C  PCP: No primary care provider on file  Date of Admission:  01/11/19    Assessment/Plan:    Hospital Problem List:     Principal Problem:    Multifocal pneumonia  Active Problems:    Lactic acidosis    Multiple myeloma (HCC)    Hypertension    GERD (gastroesophageal reflux disease)    Thrombocytopenia (HCC)    Asthma      Plan for the Primary Problem(s):  · pneumonia  · Admit to med/surg  Continue cefepime/vanco  Blood cultures pending  Initially requiring CPAP but now maintaining sats on nasal cannula  Initiate respiratory protocol  Plan for Additional Problems:   · Lactic acidosis- lactic acid initially 5 2, now 1 5 after fluids  Resolved  · Multiple myeloma- sees heme/onc at [facility]  Chemo every 3 weeks, next scheduled on Wednesday  · HTN- continue home meds  · GERD- continue PPI  · Thrombocytopenia- platelets 29  Recently at [facility] range from 37-43  Avoid anticoagulation  · Asthma- continue inhalers/nebs  Former smoker    VTE Prophylaxis: Pharmacologic VTE Prophylaxis contraindicated due to thrombocytopenia  / sequential compression device   Code Status: full code  POLST: There is no POLST form on file for this patient (pre-hospital)    Anticipated Length of Stay:  Patient will be admitted on an Inpatient basis with an anticipated length of stay of  Greater than 2 midnights  Justification for Hospital Stay: patient requires IV antibiotics    Total Time for Visit, including Counseling / Coordination of Care: 45 minutes  Greater than 50% of this total time spent on direct patient counseling and coordination of care  Chief Complaint:   Shortness of breath    History of Present Illness:    iRshabh Villafana is a 58 y o  female who presents with shortness of breath for 2 days, getting progressively worse  States her  was trying to get her to come to the hospital but she didn't want to  She went outside and collapsed   called 46  EMS found patient with sats in the low 60%  She arrived on CPAP  She denies fever  States her sister in law recently had pneumonia as well  Denies chest pain  Review of Systems:    Review of Systems   Constitutional: Negative  HENT: Negative  Eyes: Negative  Respiratory: Positive for shortness of breath  Cardiovascular: Negative  Gastrointestinal: Negative  Endocrine: Negative  Genitourinary: Negative  Musculoskeletal: Positive for arthralgias  Skin: Negative  Allergic/Immunologic: Negative  Neurological: Negative  Hematological: Negative  Psychiatric/Behavioral: Negative  Past Medical and Surgical History:     Past Medical History:   Diagnosis Date    Anemia     Asthma     Chronic constipation     Chronic pain     COPD (chronic obstructive pulmonary disease) (HCC)     CTS (carpal tunnel syndrome)     DJD (degenerative joint disease)     GERD (gastroesophageal reflux disease)     Hypertension     Multiple myeloma (HCC)     SAMIR (obstructive sleep apnea)     Osteonecrosis (HCC)     Sciatica     Thoracic aortic aneurysm (HCC)        Past Surgical History:   Procedure Laterality Date    BONE MARROW BIOPSY      COLONOSCOPY      PORTACATH PLACEMENT      REDUCTION MAMMAPLASTY      VERTEBROPLASTY         Meds/Allergies:    Prior to Admission medications    Medication Sig Start Date End Date Taking?  Authorizing Provider   acetaminophen (TYLENOL) 500 mg tablet Take 500 mg by mouth every 6 (six) hours as needed for mild pain   Yes Historical Provider, MD   acyclovir (ZOVIRAX) 400 MG tablet Take 400 mg by mouth every 4 (four) hours while awake   Yes Historical Provider, MD   ascorbic acid (VITAMIN C) 500 mg tablet Take 500 mg by mouth daily   Yes Historical Provider, MD   aspirin 81 mg chewable tablet Chew 81 mg daily Yes Historical Provider, MD   Bioflavonoid Products (BIOFLEX PO) Take 1 tablet by mouth daily   Yes Historical Provider, MD   BIOTIN PO Take 5,000 mcg by mouth 2 (two) times a day   Yes Historical Provider, MD   Calcium Carb-Cholecalciferol (OS-ANTONIETA PO) Take 1 tablet by mouth daily   Yes Historical Provider, MD   CALCIUM CARBONATE-VIT D-MIN PO Take 1 tablet by mouth daily   Yes Historical Provider, MD   chlorhexidine (PERIDEX) 0 12 % solution Apply 15 mL to the mouth or throat 2 (two) times a day   Yes Historical Provider, MD   Dexamethasone (DECADRON PO) Take by mouth   Yes Historical Provider, MD   fluticasone (FLONASE) 50 mcg/act nasal spray 2 sprays into each nostril daily   Yes Historical Provider, MD   fluticasone-vilanterol (BREO ELLIPTA) 100-25 mcg/inh inhaler Inhale 1 puff daily Rinse mouth after use     Yes Historical Provider, MD   gabapentin (NEURONTIN) 300 mg capsule Take 100 mg by mouth 2 (two) times a day   Yes Historical Provider, MD   ipratropium (ATROVENT) 0 02 % nebulizer solution Take 0 25 mg by nebulization 2 (two) times a day   Yes Historical Provider, MD   LORazepam (ATIVAN) 0 5 mg tablet Take 0 5 mg by mouth every 6 (six) hours as needed for anxiety   Yes Historical Provider, MD   metoprolol succinate (TOPROL-XL) 100 mg 24 hr tablet Take 100 mg by mouth daily   Yes Historical Provider, MD   mirtazapine (REMERON) 15 mg tablet Take 15 mg by mouth daily at bedtime   Yes Historical Provider, MD   morphine (MS CONTIN) 15 mg 12 hr tablet Take 15 mg by mouth every 4 (four) hours as needed for severe pain   Yes Historical Provider, MD   morphine (MSIR) 30 MG tablet Take 30 mg by mouth 2 (two) times a day   Yes Historical Provider, MD   Multiple Vitamin (MULTIVITAMIN) tablet Take 1 tablet by mouth daily   Yes Historical Provider, MD   omeprazole (PriLOSEC) 20 mg delayed release capsule Take 20 mg by mouth daily   Yes Historical Provider, MD   potassium chloride (K-DUR,KLOR-CON) 10 mEq tablet Take 20 mEq by mouth 2 (two) times a day   Yes Historical Provider, MD   prochlorperazine (COMPAZINE) 10 mg tablet Take 10 mg by mouth every 6 (six) hours as needed for nausea or vomiting   Yes Historical Provider, MD   SENNOSIDES PO Take 45 mg by mouth daily   Yes Historical Provider, MD     I have reviewed home medications with patient personally  Allergies: Allergies   Allergen Reactions    Aspirin     Lisinopril Angioedema    Nsaids      Avoid due to renal protection    Penicillins        Social History:     Marital Status: /Civil Union     Patient Pre-hospital Living Situation: lives with   Patient Pre-hospital Level of Mobility: ambulatory  Patient Pre-hospital Diet Restrictions: none  Substance Use History:   History   Alcohol Use No     History   Smoking Status    Former Smoker   Smokeless Tobacco    Never Used     History   Drug Use No       Family History:    non-contributory    Physical Exam:     Vitals:   Blood Pressure: 126/86 (01/11/19 0115)  Pulse: 97 (01/11/19 0407)  Temperature: 97 8 °F (36 6 °C) (01/11/19 0115)  Temp Source: Temporal (01/11/19 0115)  Respirations: (!) 30 (01/11/19 0407)  Weight - Scale: 77 1 kg (169 lb 15 6 oz) (01/10/19 2157)  SpO2: 92 % (01/11/19 0407)    Physical Exam   Constitutional: She is oriented to person, place, and time  She appears well-developed and well-nourished  No distress  HENT:   Head: Normocephalic and atraumatic  Eyes: Pupils are equal, round, and reactive to light  Conjunctivae are normal    Neck: Normal range of motion  Neck supple  No tracheal deviation present  No thyromegaly present  Cardiovascular: Normal rate and regular rhythm  Pulmonary/Chest: Effort normal  No respiratory distress  sats now in low 90's on nasal cannula  Able to speak in full sentences   Abdominal: Soft  Bowel sounds are normal  She exhibits no distension  There is no tenderness  Musculoskeletal: Normal range of motion   She exhibits no edema, tenderness or deformity  Neurological: She is alert and oriented to person, place, and time  Skin: Skin is warm and dry  No rash noted  She is not diaphoretic  No erythema  Psychiatric: She has a normal mood and affect  Her behavior is normal    Vitals reviewed  Additional Data:     Lab Results: I have personally reviewed pertinent reports  Results from last 7 days  Lab Units 01/10/19  2159 01/10/19  2151   WBC Thousand/uL  --  4 82   HEMOGLOBIN g/dL  --  10 7*   I STAT HEMOGLOBIN g/dl 10 5*  --    HEMATOCRIT %  --  35 5   HEMATOCRIT, ISTAT % 31*  --    PLATELETS Thousands/uL  --  29*   LYMPHO PCT %  --  4*   MONO PCT %  --  23*   EOS PCT %  --  0       Results from last 7 days  Lab Units 01/10/19  2159 01/10/19  2151   POTASSIUM mmol/L  --  4 2   CHLORIDE mmol/L  --  100   CO2 mmol/L  --  22   CO2, I-STAT mmol/L 24  --    BUN mg/dL  --  47*   CREATININE mg/dL  --  1 41*   CALCIUM mg/dL  --  10 5*   ALK PHOS U/L  --  100   ALT U/L  --  21   AST U/L  --  20   GLUCOSE, ISTAT mg/dl 259*  --        Results from last 7 days  Lab Units 01/10/19  2152   INR  1 25*       Imaging: I have personally reviewed pertinent reports  Cta Ed Chest Pe Study    Result Date: 1/10/2019  Narrative: CTA - CHEST WITH IV CONTRAST - PULMONARY ANGIOGRAM INDICATION:   hypoxia, tachypnea, cancer  Severe respiratory distress, on BiPAP, tachycardia  History of COPD, multiple myeloma  COMPARISON: Plain film portable chest radiograph performed today and CT of the chest dated June 18, 2009  TECHNIQUE: CTA examination of the chest was performed using angiographic technique according to a protocol specifically tailored to evaluate for pulmonary embolism  Axial, sagittal, and coronal 2D reformatted images were created from the source data and  submitted for interpretation  In addition, coronal 3D MIP postprocessing was performed on the acquisition scanner  Radiation dose length product (DLP) for this visit:  312 mGy-cm     This examination, like all CT scans performed in the [facility], was performed utilizing techniques to minimize radiation dose exposure, including the use of iterative reconstruction and automated exposure control  IV Contrast:  85 mL of iodixanol (VISIPAQUE)  FINDINGS: PULMONARY ARTERIAL TREE:  No pulmonary embolus is seen  LUNGS:  Moderate emphysematous changes are present  There is consolidation containing air bronchograms as well as a component of volume loss within the right middle lobe of the lung and the right lower lobe of the lung  There are also patchy areas of airspace opacity in the right upper lobe and left upper lobe of the lung  There is a combination of atelectasis and consolidation containing air bronchograms posteriorly in the inferior left lingula  There is atelectasis posteriorly at the left lung base  PLEURA:  Unremarkable  HEART/GREAT VESSELS:  There is mild atherosclerosis of the thoracic aorta  MEDIASTINUM AND ROSY:  Unremarkable  CHEST WALL AND LOWER NECK:   Unremarkable  VISUALIZED STRUCTURES IN THE UPPER ABDOMEN:  Left adrenal fullness appears similar to the prior study  OSSEOUS STRUCTURES:  There are numerous tiny lytic areas as well as scattered areas of sclerosis, most pronounced in the spine and ribs, likely related to the patient's history of multiple myeloma, worse compared with 2009  There is an old healed right rib fracture deformity  There has been prior vertebroplasty  Deformity of the inferior sternum appears similar to the prior study  Impression: Multifocal pneumonia, as described above  Please see discussion  Follow-up after treatment to ensure complete radiographic resolution is recommended  Moderate emphysema  No evidence of pulmonary embolism is seen  There is rather extensive osseous abnormality, as described above, likely related to patient's history of multiple myeloma, worse compared with 2009    I personally discussed this study with CHLOE DELANEY MELODY on 1/10/2019 at 11:33 PM  Workstation performed: [id number]       EKG, Pathology, and Other Studies Reviewed on Admission:   · EKG: sinus tach    Allscripts / Epic Records Reviewed: Yes     ** Please Note: This note has been constructed using a voice recognition system   **

## 2019-01-11 NOTE — PLAN OF CARE
DISCHARGE PLANNING     Discharge to home or other facility with appropriate resources Progressing        INFECTION - ADULT     Absence or prevention of progression during hospitalization Progressing     Absence of fever/infection during neutropenic period Progressing        Knowledge Deficit     Patient/family/caregiver demonstrates understanding of disease process, treatment plan, medications, and discharge instructions Progressing        METABOLIC, FLUID AND ELECTROLYTES - ADULT     Electrolytes maintained within normal limits Progressing     Fluid balance maintained Progressing        PAIN - ADULT     Verbalizes/displays adequate comfort level or baseline comfort level Progressing        Potential for Falls     Patient will remain free of falls Progressing        Prexisting or High Potential for Compromised Skin Integrity     Skin integrity is maintained or improved Progressing        RESPIRATORY - ADULT     Achieves optimal ventilation and oxygenation Progressing        SAFETY ADULT     Maintain or return to baseline ADL function Progressing     Maintain or return mobility status to optimal level Progressing

## 2019-01-11 NOTE — DISCHARGE INSTR - OTHER ORDERS
Wound Care:  1-Hydraguard lotion to bilateral heels, buttocks, and sacrum twice per day and as needed  2-Elevate heels off of bed to offload pressure  3-Soft care cushion to chair when sitting out of bed  4-Moisturize skin daily with lotion  5-Turn/reposition every 2 hours while in bed for pressure re-distribution on skin  Weight shift frequently when sitting in chair  Offload buttocks as often as possible

## 2019-01-11 NOTE — SEPSIS NOTE
Sepsis Note   Arpit Downey 58 y o  female MRN: [id number]  Unit/Bed#: ED 20 Encounter: [id number]            Initial Sepsis Screening     Row Name 01/10/19 2246 01/10/19 2227             Is the patient's history suggestive of a new or worsening infection?  -- (!)  Yes (Proceed)  -DS       Suspected source of infection  -- pneumonia  -DS       Are two or more of the following signs & symptoms of infection both present and new to the patient?  -- (!)  Yes (Proceed)  -DS       Indicate SIRS criteria Altered mental status; Tachycardia > 90 bpm;WBC > 10% bands; Tachypnea > 20 resp per min  -DS Altered mental status; Tachycardia > 90 bpm;Tachypnea > 20 resp per min  -DS       If the answer is yes to both questions, suspicion of sepsis is present  --  --       If severe sepsis is present AND tissue hypoperfusion perists in the hour after fluid resuscitation or lactate > 4, the patient meets criteria for SEPTIC SHOCK  --  --       Are any of the following organ dysfunction criteria present within 6 hours of suspected infection and SIRS criteria that are NOT considered to be chronic conditions?  -- (!)  Yes  -DS       Organ dysfunction  -- Lactate > 2 0 mmol/L  -DS       Date of presentation of severe sepsis  -- 01/10/19  -DS       Time of presentation of severe sepsis  -- 2228 -DS       Tissue hypoperfusion persists in the hour after crystalloid fluid administration, evidenced, by either:  -- * OR * Lactate level is greater to or equal 4 mmol/dL ( ___ mmol/dL in comment field)  -DS       Was hypotension present within one hour of the conclusion of crystalloid fluid administration?  -- Yes  -DS       Date of presentation of septic shock  -- 01/10/19  -DS       Time of presentation of septic shock  -- 2228  -DS         User Key  (r) = Recorded By, (t) = Taken By, (c) = Cosigned By    [address] Name Provider Type    KY Whiting DO Resident               Default Flowsheet Data (last 720 hours)      Sepsis Reassess     Row Name 01/10/19 2980                   Repeat Volume Status and Tissue Perfusion Assessment Performed    Repeat Volume Status and Tissue Perfusion Assessment Performed Yes  -DS           Volume Status and Tissue Perfusion Post Fluid Resuscitation * Must Document All *    Vital Signs Reviewed (HR, RR, BP, T)  --        Shock Index Reviewed  --        Arterial Oxygen Saturation Reviewed (POx, SaO2 or SpO2)  --        Cardio (!)  Tachycardia   Tachycardia but improved to the 110-120 range  -DS        Pulmonary (!)  Tachypnea  -DS        Capillary Refill Brisk  -DS        Peripheral Pulses Radial;Dorsalis Pedis  -DS        Peripheral Pulse +2  -DS        Dorsalis Pedis +1  -DS        Skin Dry; Warm  -DS        Urine output assessed --   Urinating at this time  -DS           *OR*   Intensive Monitoring- Must Document One of the Following Four *:    Vital Signs Reviewed  --        * Central Venous Pressure (CVP or RAP)  --        * Central Venous Oxygen (SVO2, ScvO2 or Oxygen saturation via central catheter)  --        * Bedside Cardiovascular US in IVC diameter and % collapse  --        * Passive Leg Raise OR Crystalloid Challenge  --          User Key  (r) = Recorded By, (t) = Taken By, (c) = Cosigned By    Initials Name Provider Type    DO Delma Cohen

## 2019-01-11 NOTE — SOCIAL WORK
CM sent email to both [address] [place] [address] and [employer] in re: pt does not have any insurance listed and request was made for an MA application to be completed with them prior to d/c

## 2019-01-11 NOTE — UTILIZATION REVIEW
Initial Clinical Review    Admission: Date/Time/Statement: 1/11/19 @ 0005   Orders Placed This Encounter   Procedures    Inpatient Admission (expected length of stay for this patient is greater than two midnights)     Standing Status:   Standing     Number of Occurrences:   1     Order Specific Question:   Admitting Physician     Answer:   Tino Rodgers [[id number]]     Order Specific Question:   Level of Care     Answer:   Med Surg [[id number]]     Order Specific Question:   Estimated length of stay     Answer:   More than 2 Midnights     Order Specific Question:   Certification     Answer:   I certify that inpatient services are medically necessary for this patient for a duration of greater than two midnights  See H&P and MD Progress Notes for additional information about the patient's course of treatment  ED: Date/Time/Mode of Arrival:   ED Arrival Information     Expected Arrival Acuity Means of Arrival Escorted By Service Admission Type    - 1/10/2019 21:44 Emergent Ambulance [facility] Hospitalist Emergency    Arrival Complaint    SOB        Chief Complaint:   Chief Complaint   Patient presents with    Shortness of Breath     Pt c/o SOB and then collapsed at home  EMS stated O2 was 62% on arrival  Pt presented with tachypnea on CPAP     History of Illness:  Per H&P: 58 y o  female who presents with shortness of breath for 2 days, getting progressively worse  States her  was trying to get her to come to the hospital but she didn't want to  She went outside and collapsed   called 46  EMS found patient with sats in the low 60%  She arrived on CPAP  She denies fever  States her sister in law recently had pneumonia as well   Denies chest pain         ED Vital Signs:   ED Triage Vitals   Temperature Pulse Respirations Blood Pressure SpO2   01/10/19 2346 01/10/19 2147 01/10/19 2147 01/10/19 2147 01/10/19 2147   98 6 °F (37 °C) (!) 150 (!) 38 144/81 (!) 85 %      Temp Source Heart Rate Source Patient Position - Orthostatic VS BP Location FiO2 (%)   01/10/19 2349 01/10/19 2147 01/10/19 2147 01/10/19 2147 --   Oral Monitor Sitting Right arm       Pain Score       01/10/19 2228       9        Wt Readings from Last 1 Encounters:   01/10/19 77 1 kg (169 lb 15 6 oz)     Vital Signs (abnormal):   01/10/19 2349 98 6 °F (37 °C)  110  32 120/56 96 % -- HS   01/10/19 2346 98 6 °F (37 °C) -- -- -- -- -- DS   01/10/19 2228 --  130  28 128/60 99 % -- NS   01/10/19 2200 -- -- -- -- 91 % -- PW   01/10/19 2157 -- -- -- -- -- 77 1 kg (169 lb 15 6 oz) NS   01/10/19 2147 --  150  38 144/81  85 %           Pertinent Labs/Diagnostic Test Results:   Lactic acid 5 2,   Repeat   WBC's 4 82,   H&H 10 7 / 35 5  BUN 47,  Cr 1 41,   A gap 18,   Glu 249,   Ca 10 5,   Alb 2 3  Venous gas:  7 355 / 38 9 / 57 / 84 6%,  Bicarb 21 8  Urine:  2+ protein,  Mod blood  BC's and Urine cx's pending    CXR: 1   Left basilar opacity and patchy airspace opacity throughout the right lung suspicious for pneumonia  2   Emphysema  CTA Chest: Multifocal pneumonia, as described above     Moderate emphysema  No evidence of pulmonary embolism is seen  There is rather extensive osseous abnormality, as described above, likely related to patient's history of multiple myeloma, worse compared with 2009        ED Treatment:   Medication Administration from 01/10/2019 2144 to 01/11/2019 0059       Date/Time Order Dose Route Action Action by Comments     01/10/2019 2235 sodium chloride 0 9 % bolus 500 mL 0 mL Intravenous Stopped Lary Francis RN      01/10/2019 2201 sodium chloride 0 9 % bolus 500 mL 500 mL Intravenous [address] [facility]      01/10/2019 2201 albuterol inhalation solution 5 mg 5 mg Nebulization Given Lary Francis RN      01/10/2019 2201 ipratropium (ATROVENT) 0 02 % inhalation solution 0 5 mg 0 5 mg Nebulization Given Lary Francis RN      01/10/2019 9994 iodixanol (VISIPAQUE) 320 MG/ML injection 85 mL 85 mL Intravenous Given Sayra Arellano      01/11/2019 0018 sodium chloride 0 9 % bolus 1,000 mL 0 mL Intravenous Stopped Carlito Bentley RN      01/10/2019 2231 sodium chloride 0 9 % bolus 1,000 mL 1,000 mL Intravenous [facility]      01/10/2019 2232 sodium chloride 0 9 % bolus 1,000 mL 1,000 mL Intravenous New Bag Sherwin Mckeon RN      01/10/2019 2300 cefepime (MAXIPIME) 2 g/50 mL dextrose IVPB 0 mg Intravenous Stopped Carlito Bentley RN      01/10/2019 2233 cefepime (MAXIPIME) 2 g/50 mL dextrose IVPB 2,000 mg Intravenous New Bag Sherwin Mckeon RN      01/10/2019 2308 vancomycin (VANCOCIN) 1,250 mg in sodium chloride 0 9 % 250 mL IVPB 1,250 mg Intravenous New Carlito Bentley RN         Past Medical/Surgical History:  Past Medical History:   Diagnosis Date    Anemia     Asthma     Chronic constipation     Chronic pain     COPD (chronic obstructive pulmonary disease) ([facility])     CTS (carpal tunnel syndrome)     DJD (degenerative joint disease)     GERD (gastroesophageal reflux disease)     Hypertension     Multiple myeloma (HCC)     SAMIR (obstructive sleep apnea)     Osteonecrosis (HCC)     Sciatica     Thoracic aortic aneurysm ([facility])      Admitting Diagnosis: Lactic acidosis [E87 2]  Respiratory distress [R06 03]  SOB (shortness of breath) [R06 02]  Bandemia [D72 825]  Multifocal pneumonia [J18 9]  Age/Sex: 58 y o  female     Assessment/Plan:   78 yo female admitted with Multifocal Pneumonia - Continue cefepime/vanco  Blood cultures pending  Initially requiring CPAP but now maintaining sats on nasal cannula  · Lactic acidosis- lactic acid initially 5 2, now 1 5 after fluids  Resolved  · Multiple myeloma- sees heme/onc at [facility]  Chemo every 3 weeks, next scheduled on Wednesday  · HTN- continue home meds  · GERD- continue PPI  · Thrombocytopenia- platelets 29  Recently at [facility] range from 37-43  Avoid anticoagulation  · Asthma- continue inhalers/nebs   Former smoker       Admission Orders: INPT  TELE  O2 @ 6 liters - weaned to 4 liters  Droplet Isolation  CBC,  BMP in am  SCD's  Consult Wound Care    Scheduled Meds:   Current Facility-Administered Medications:  acetaminophen 650 mg Oral Q6H PRN     acyclovir 400 mg Oral Daily     albuterol 2 5 mg Nebulization Q6H PRN     amLODIPine 2 5 mg Oral Daily     aspirin 81 mg Oral Daily     cefepime 2,000 mg Intravenous Q12H     chlorhexidine 15 mL Mouth/Throat BID     fluticasone 2 spray Nasal Daily     gabapentin 300 mg Oral Daily     LORazepam 0 5 mg Oral Q6H PRN     metoprolol succinate 100 mg Oral Daily     mirtazapine 15 mg Oral HS PRN     morphine 30 mg Oral BID     morphine 15 mg Oral Q4H PRN     ondansetron 4 mg Intravenous Q6H PRN     pantoprazole 40 mg Oral Early Morning     potassium chloride 20 mEq Oral BID     senna 43 mg Oral Daily             vancomycin 1,250 mg Intravenous Q24H       Continuous Infusions:   sodium chloride 100 mL/hr Last Rate: 100 mL/hr (01/11/19 1215)         [address]  Utilization Review Department  Phone: [phone]; Fax [phone]  [email or web address]  ATTENTION: Please call with any questions or concerns to [phone]  and carefully listen to the prompts so that you are directed to the right person  Send all requests for admission clinical reviews, approved or denied determinations and any other requests to fax [phone]   All voicemails are confidential

## 2019-01-11 NOTE — SOCIAL WORK
CM met with patient at bedside to address discharge needs  CM pointed out name/number on the white board and communicated she was that individual      Patient lives with her , Arsen, in a ranch-style home  Patient denies difficulty with steps  Patient is independent with ADLs and functional mobility  Food shopping & meal prep is done by patient  Patient uses a cane for DME, and has home O2 provided by [employer]  Patient is able to ambulate without assistance from a seated or laying position  No recent hx of VNA reported; she received services 16 years ago  Patient is not involved in any community activities  Patient is on SSD  PCP identified as Susana Blanchard DO, a [facility] physician, therefore no TCM/HRR appointment will be made at d/c  No POA identified but Nian morris is permitted to be patient's healthcare representative and designated caregiver if/when needed  Patient uses [facility]; patient made aware of [facility] in house  Patient does drive; reports her  is available to transport home  Is this not a readmission within the last 30 days  CM will remain available and follow as needed  CM also encouraged patient to follow up with all/any recommended appointments after discharge  Patient advised of importance for patient and family to participate in managing patients medical well being

## 2019-01-11 NOTE — ED ATTENDING ATTESTATION
Rose Marie Gutierres MD, saw and evaluated the patient  I have discussed the patient with the resident/non-physician practitioner and agree with the resident's/non-physician practitioner's findings, Plan of Care, and MDM as documented in the resident's/non-physician practitioner's note, except where noted  All available labs and Radiology studies were reviewed  At this point I agree with the current assessment done in the Emergency Department  I have conducted an independent evaluation of this patient a history and physical is as follows:    50-year-old female presents for evaluation of respiratory distress  Patient is a poor historian secondary to severity of illness  Unable perform review of systems secondary to severity of illness    Patient is in severe respiratory distress on BiPAP decreased air entry bilateral, cardiac tachycardic but regular, decision making;-acute wrist restraints-will do BiPAP cardiac/septic workup PE study, admit Critical Care Time  CritCare Time    Procedures

## 2019-01-11 NOTE — ED NOTES
Hadley Avila from Respiratory took pt off Bi-PAP at this time  Pt on 6L of O2 at this time        Halley Roach RN  01/10/19 1500

## 2019-01-11 NOTE — CONSULTS
Consult Note - Wound   Sylvia Sommer 58 y o  female MRN: [id number]  Unit/Bed#: [address] 2 -01 Encounter: [id number]      History and Present Illness:  58year old female presented to the hospital from home with multifocal pneumonia  Patient had fall just prior to discharge  History significant for:  Anemia   COPD (chronic obstructive pulmonary disease) (HCC)   Hypertension   Multiple myeloma (HCC) with chemotherapy   SAMIR (obstructive sleep apnea)   Osteonecrosis ([address] )       Assessment Findings:   Patient reports chronic joint pain  Able to turn in bed independently for assessment-normally ambulatory  Denies incontinence  Skin folds and bilateral heels intact  Blanchable erythema and hyperpigmentation to bilateral buttocks  1   Present on admission partial thickness wounds of unknown etiology to bilateral buttocks--Likely due to pressure and intertriginous dermatitis--patient states her buttocks have been sore for less than a week, since she was sitting more when not feeling well  Right buttock wound is irregular and left is linear (not typical of a pressure injury)  Patient denies incontinence  Periwound areas with blanchable erythema and hyperpigmentation  No signs of wound infection at this time  Plan:   1-Hydraguard to bilateral heels BID and PRN  2-Elevate heels to offload pressure  3-Soft care cushion when out of bed  4-Moisturize skin daily with skin nourishing cream   5-Encourage/remind patient to turn/reposition q2h or when medically stable for pressure re-distribution on skin  Provide assistance as needed  6-Cleanse bilateral buttocks with soap and water, pat dry  Apply 3m no sting skin barrier film to periwound areas  Cover with Allevyn Life small sacral foam dressing  Bruce with T   Change every 3 days and PRN with soilage  7-Wound care team to follow  Plan of care reviewed with primary RN, Haider Laird  Patient education provided on use of Allevyn foam dressing    Encouraged patient to apply Hydraguard lotion to bilateral buttocks twice per day and make frequent position changes  when discharged to home  Wound 01/11/19 Buttocks Left partial thickness wound of unknown etiology (Active)   Wound Description Clean;Pink 1/11/2019 11:00 AM   Aleja-wound Assessment Erythema; Intact 1/11/2019 11:00 AM   Shape Linear 1/11/2019 11:00 AM   Wound Length (cm) 1 cm 1/11/2019 11:00 AM   Wound Width (cm) 0 2 cm 1/11/2019 11:00 AM   Wound Depth (cm) 0 1 1/11/2019 11:00 AM   Calculated Wound Volume (cm^3) 0 02 cm^3 1/11/2019 11:00 AM   Tunneling 0 cm 1/11/2019 11:00 AM   Undermining 0 1/11/2019 11:00 AM   Drainage Amount None 1/11/2019 11:00 AM   Non-staged Wound Description Partial thickness 1/11/2019 11:00 AM   Treatments Cleansed 1/11/2019 11:00 AM   Dressing Other (Comment) 1/11/2019 11:00 AM   Wound packed? No 1/11/2019 11:00 AM   Patient Tolerance Tolerated well 1/11/2019 11:00 AM   Dressing Status Clean; Intact;Dry 1/11/2019 11:00 AM       Wound 01/11/19 Buttocks Right partial thickness wound of unknown etiology (Active)   Wound Description Clean;Pink 1/11/2019 11:00 AM   Aleja-wound Assessment Erythema; Intact 1/11/2019 11:00 AM   Wound Length (cm) 0 5 cm 1/11/2019 11:00 AM   Wound Width (cm) 0 5 cm 1/11/2019 11:00 AM   Wound Depth (cm) 0 1 1/11/2019 11:00 AM   Calculated Wound Volume (cm^3) 0 02 cm^3 1/11/2019 11:00 AM   Tunneling 0 cm 1/11/2019 11:00 AM   Undermining 0 1/11/2019 11:00 AM   Drainage Amount None 1/11/2019 11:00 AM   Non-staged Wound Description Partial thickness 1/11/2019 11:00 AM   Treatments Cleansed 1/11/2019 11:00 AM   Dressing Other (Comment) 1/11/2019 11:00 AM   Wound packed? No 1/11/2019 11:00 AM   Patient Tolerance Tolerated well 1/11/2019 11:00 AM   Dressing Status Clean;Dry; Intact 1/11/2019 11:00 AM     Kd OSORION, RN, Archana Montesinos

## 2019-01-11 NOTE — SEPSIS NOTE
Sepsis Note   Alen Aase 58 y o  female MRN: [id number]  Unit/Bed#: ED 25 Encounter: [id number]            Initial Sepsis Screening     Row Name 01/10/19 2227                Is the patient's history suggestive of a new or worsening infection? (!)  Yes (Proceed)  -DS        Suspected source of infection pneumonia  -DS        Are two or more of the following signs & symptoms of infection both present and new to the patient? (!)  Yes (Proceed)  -DS        Indicate SIRS criteria Altered mental status; Tachycardia > 90 bpm;Tachypnea > 20 resp per min  -DS        If the answer is yes to both questions, suspicion of sepsis is present  --        If severe sepsis is present AND tissue hypoperfusion perists in the hour after fluid resuscitation or lactate > 4, the patient meets criteria for SEPTIC SHOCK  --        Are any of the following organ dysfunction criteria present within 6 hours of suspected infection and SIRS criteria that are NOT considered to be chronic conditions? (!)  Yes  -DS        Organ dysfunction Lactate > 2 0 mmol/L  -DS        Date of presentation of severe sepsis 01/10/19  -DS        Time of presentation of severe sepsis 2228  -DS        Tissue hypoperfusion persists in the hour after crystalloid fluid administration, evidenced, by either: * OR * Lactate level is greater to or equal 4 mmol/dL ( ___ mmol/dL in comment field)  -DS        Was hypotension present within one hour of the conclusion of crystalloid fluid administration?  Yes  -DS        Date of presentation of septic shock 01/10/19  -DS        Time of presentation of septic shock 2228  -DS          User Key  (r) = Recorded By, (t) = Taken By, (c) = Cosigned By    [address] Name Provider Type    DS DO Delma Hardwick

## 2019-01-11 NOTE — PROGRESS NOTES
Vancomycin Assessment    Abbi Castañeda is a 58 y o  female who is currently ordered vancomycin 1250 mg IV q12h for Pneumonia     Relevant clinical data and objective history reviewed:  Creatinine   Date Value Ref Range Status   01/10/2019 1 41 (H) 0 60 - 1 30 mg/dL Final     Comment:     Standardized to IDMS reference method     /86 (BP Location: Right arm)   Pulse 97   Temp 97 8 °F (36 6 °C) (Temporal)   Resp (!) 30   Wt 77 1 kg (169 lb 15 6 oz)   SpO2 92%   No intake/output data recorded  Lab Results   Component Value Date/Time    BUN 47 (H) 01/10/2019 09:51 PM    WBC 4 82 01/10/2019 09:51 PM    HGB 10 5 (L) 01/10/2019 09:59 PM    HGB 10 7 (L) 01/10/2019 09:51 PM    HCT 31 (L) 01/10/2019 09:59 PM    HCT 35 5 01/10/2019 09:51 PM     (H) 01/10/2019 09:51 PM    PLT 29 (LL) 01/10/2019 09:51 PM     Temp Readings from Last 3 Encounters:   01/11/19 97 8 °F (36 6 °C) (Temporal)     Vancomycin Days of Therapy: 2    Assessment/Plan  The patient is currently ordered vancomycin utilizing scheduled dosing based on actual body weight  The patient is currently ordered 1250 mg IV q12h and after clinical evaluation will be changed to 1250 mg IV q24h  Pharmacy will also follow closely for s/sx of nephrotoxicity, infusion reactions and appropriateness of therapy  BMP and CBC will be ordered per protocol  Plan for trough as patient approaches steady state, prior to the 4th  dose at approximately 2230 on 1/13/19  Due to infection severity, will target a trough of 15-20 (appropriate for most indications)   Pharmacy will continue to follow the patients culture results and clinical progress daily      Malina Barfield, Pharmacist

## 2019-01-11 NOTE — PLAN OF CARE

## 2019-01-12 ENCOUNTER — APPOINTMENT (INPATIENT)
Dept: RADIOLOGY | Facility: HOSPITAL | Age: 64
DRG: 871 | End: 2019-01-12
Payer: COMMERCIAL

## 2019-01-12 LAB
ANION GAP SERPL CALCULATED.3IONS-SCNC: 10 MMOL/L (ref 4–13)
ANISOCYTOSIS BLD QL SMEAR: PRESENT
BASE EXCESS BLDA CALC-SCNC: 1.4 MMOL/L
BASOPHILS # BLD MANUAL: 0 THOUSAND/UL (ref 0–0.1)
BASOPHILS NFR MAR MANUAL: 0 % (ref 0–1)
BUN SERPL-MCNC: 17 MG/DL (ref 5–25)
CALCIUM SERPL-MCNC: 9.1 MG/DL (ref 8.3–10.1)
CHLORIDE SERPL-SCNC: 105 MMOL/L (ref 100–108)
CO2 SERPL-SCNC: 26 MMOL/L (ref 21–32)
CREAT SERPL-MCNC: 0.7 MG/DL (ref 0.6–1.3)
EOSINOPHIL # BLD MANUAL: 0 THOUSAND/UL (ref 0–0.4)
EOSINOPHIL NFR BLD MANUAL: 0 % (ref 0–6)
ERYTHROCYTE [DISTWIDTH] IN BLOOD BY AUTOMATED COUNT: 15.2 % (ref 11.6–15.1)
GFR SERPL CREATININE-BSD FRML MDRD: 92 ML/MIN/1.73SQ M
GLUCOSE SERPL-MCNC: 110 MG/DL (ref 65–140)
HCO3 BLDA-SCNC: 27.1 MMOL/L (ref 22–28)
HCT VFR BLD AUTO: 27.3 % (ref 34.8–46.1)
HGB BLD-MCNC: 8.3 G/DL (ref 11.5–15.4)
LYMPHOCYTES # BLD AUTO: 0.35 THOUSAND/UL (ref 0.6–4.47)
LYMPHOCYTES # BLD AUTO: 5 % (ref 14–44)
MACROCYTES BLD QL AUTO: PRESENT
MCH RBC QN AUTO: 32.9 PG (ref 26.8–34.3)
MCHC RBC AUTO-ENTMCNC: 30.4 G/DL (ref 31.4–37.4)
MCV RBC AUTO: 108 FL (ref 82–98)
MONOCYTES # BLD AUTO: 0.62 THOUSAND/UL (ref 0–1.22)
MONOCYTES NFR BLD: 9 % (ref 4–12)
NASAL CANNULA: 6
NEUTROPHILS # BLD MANUAL: 5.93 THOUSAND/UL (ref 1.85–7.62)
NEUTS BAND NFR BLD MANUAL: 3 % (ref 0–8)
NEUTS SEG NFR BLD AUTO: 83 % (ref 43–75)
NRBC BLD AUTO-RTO: 0 /100 WBCS
O2 CT BLDA-SCNC: 11.2 ML/DL (ref 16–23)
OXYHGB MFR BLDA: 85 % (ref 94–97)
PCO2 BLDA: 47.9 MM HG (ref 36–44)
PH BLDA: 7.37 [PH] (ref 7.35–7.45)
PLATELET # BLD AUTO: 16 THOUSANDS/UL (ref 149–390)
PLATELET BLD QL SMEAR: ABNORMAL
PO2 BLDA: 52.3 MM HG (ref 75–129)
POTASSIUM SERPL-SCNC: 3.5 MMOL/L (ref 3.5–5.3)
RBC # BLD AUTO: 2.52 MILLION/UL (ref 3.81–5.12)
SODIUM SERPL-SCNC: 141 MMOL/L (ref 136–145)
SPECIMEN SOURCE: ABNORMAL
TOTAL CELLS COUNTED SPEC: 100
WBC # BLD AUTO: 6.9 THOUSAND/UL (ref 4.31–10.16)

## 2019-01-12 PROCEDURE — 99232 SBSQ HOSP IP/OBS MODERATE 35: CPT | Performed by: INTERNAL MEDICINE

## 2019-01-12 PROCEDURE — 94660 CPAP INITIATION&MGMT: CPT

## 2019-01-12 PROCEDURE — 94760 N-INVAS EAR/PLS OXIMETRY 1: CPT

## 2019-01-12 PROCEDURE — 94640 AIRWAY INHALATION TREATMENT: CPT

## 2019-01-12 PROCEDURE — 85027 COMPLETE CBC AUTOMATED: CPT | Performed by: INTERNAL MEDICINE

## 2019-01-12 PROCEDURE — 36600 WITHDRAWAL OF ARTERIAL BLOOD: CPT

## 2019-01-12 PROCEDURE — 80048 BASIC METABOLIC PNL TOTAL CA: CPT | Performed by: INTERNAL MEDICINE

## 2019-01-12 PROCEDURE — 82805 BLOOD GASES W/O2 SATURATION: CPT | Performed by: INTERNAL MEDICINE

## 2019-01-12 PROCEDURE — 85007 BL SMEAR W/DIFF WBC COUNT: CPT | Performed by: INTERNAL MEDICINE

## 2019-01-12 PROCEDURE — 71045 X-RAY EXAM CHEST 1 VIEW: CPT

## 2019-01-12 RX ORDER — MORPHINE SULFATE 15 MG/1
15 TABLET ORAL EVERY 6 HOURS PRN
Status: DISCONTINUED | OUTPATIENT
Start: 2019-01-12 | End: 2019-01-17 | Stop reason: HOSPADM

## 2019-01-12 RX ORDER — VANCOMYCIN HYDROCHLORIDE 500 MG/100ML
10 INJECTION, SOLUTION INTRAVENOUS EVERY 12 HOURS
Status: DISCONTINUED | OUTPATIENT
Start: 2019-01-12 | End: 2019-01-13 | Stop reason: DRUGHIGH

## 2019-01-12 RX ORDER — MORPHINE SULFATE 15 MG/1
15 TABLET, FILM COATED, EXTENDED RELEASE ORAL 2 TIMES DAILY
Status: DISCONTINUED | OUTPATIENT
Start: 2019-01-12 | End: 2019-01-12

## 2019-01-12 RX ADMIN — CHLORHEXIDINE GLUCONATE 0.12% ORAL RINSE 15 ML: 1.2 LIQUID ORAL at 17:11

## 2019-01-12 RX ADMIN — MORPHINE SULFATE 15 MG: 15 TABLET ORAL at 01:28

## 2019-01-12 RX ADMIN — METOPROLOL SUCCINATE 100 MG: 100 TABLET, EXTENDED RELEASE ORAL at 08:38

## 2019-01-12 RX ADMIN — CHLORHEXIDINE GLUCONATE 0.12% ORAL RINSE 15 ML: 1.2 LIQUID ORAL at 08:36

## 2019-01-12 RX ADMIN — VANCOMYCIN HYDROCHLORIDE 500 MG: 500 INJECTION, SOLUTION INTRAVENOUS at 20:45

## 2019-01-12 RX ADMIN — ACETAMINOPHEN 650 MG: 325 TABLET, FILM COATED ORAL at 23:32

## 2019-01-12 RX ADMIN — CEFEPIME HYDROCHLORIDE 2000 MG: 2 INJECTION, POWDER, FOR SOLUTION INTRAVENOUS at 22:44

## 2019-01-12 RX ADMIN — PANTOPRAZOLE SODIUM 40 MG: 40 TABLET, DELAYED RELEASE ORAL at 05:52

## 2019-01-12 RX ADMIN — VANCOMYCIN HYDROCHLORIDE 1250 MG: 1 INJECTION, POWDER, LYOPHILIZED, FOR SOLUTION INTRAVENOUS at 00:06

## 2019-01-12 RX ADMIN — ALBUTEROL SULFATE 2.5 MG: 2.5 SOLUTION RESPIRATORY (INHALATION) at 16:10

## 2019-01-12 RX ADMIN — FLUTICASONE PROPIONATE 2 SPRAY: 50 SPRAY, METERED NASAL at 08:38

## 2019-01-12 RX ADMIN — CEFEPIME HYDROCHLORIDE 2000 MG: 2 INJECTION, POWDER, FOR SOLUTION INTRAVENOUS at 10:24

## 2019-01-12 RX ADMIN — AMLODIPINE BESYLATE 2.5 MG: 2.5 TABLET ORAL at 08:36

## 2019-01-12 RX ADMIN — ACYCLOVIR 400 MG: 200 CAPSULE ORAL at 08:38

## 2019-01-12 RX ADMIN — GABAPENTIN 300 MG: 300 CAPSULE ORAL at 08:37

## 2019-01-12 RX ADMIN — SODIUM CHLORIDE 100 ML/HR: 0.9 INJECTION, SOLUTION INTRAVENOUS at 10:23

## 2019-01-12 RX ADMIN — ACETAMINOPHEN 650 MG: 325 TABLET, FILM COATED ORAL at 01:34

## 2019-01-12 RX ADMIN — MORPHINE SULFATE 30 MG: 30 TABLET, EXTENDED RELEASE ORAL at 08:36

## 2019-01-12 NOTE — PROGRESS NOTES
Asked to see patient today secondary to increasing lethargy as compared to yesterday  Dr Fatuma Rudolph noted that the patient had been wide awake and conversational yesterday, but upon his exam today the patient could barely stay awake  I I asked the charge nurse on [address] 2 and the patient's primary nurse to accompany me in the room  While I was speaking to the patient I asked them to go through her belongings to ascertain if she had any medications in the room with her  The patient denies complaints of pain or discomfort she denied shortness of breath  She was unable to remember how she felt yesterday as compared to today  She had multiple periods of nodding off while I was speaking to her  She offered no complaints  The nurses found a bottle of several different shapes and sizes of pills with no identification  Additionally, they found multiple pills at the bottom of her purse  All of these medications were turned over to pharmacy for identification and safe keeping  The patient remains hemodynamically stable, no acute distress  Will continue to monitor oxygenation     98 6 °F (37 °C)   116  22   178/83   88 %

## 2019-01-12 NOTE — PROGRESS NOTES
Progress Note - Edward Fisher 58 y o  female MRN: [id number]    Unit/Bed#: [address]-01 Encounter: [id number]      Subjective: The patient is lethargic  She is arousable and will answer some questions but quickly drifts off to sleep  She says she does not know how her breathing is  She has no pain  Physical Exam:   Temp:  [97 2 °F (36 2 °C)-101 7 °F (38 7 °C)] 98 6 °F (37 °C)  HR:  [] 116  Resp:  [21-26] 22  BP: (136-178)/(72-83) 178/83    Gen:  Well-developed, well-nourished, in no distress, but lethargic  Neck:  Supple  No lymphadenopathy, goiter, or bruit  Heart:  Regular rhythm  No murmur, gallop, or rub  Lungs:  Diminished breath sounds diffusely  Abd:  Soft with active bowel sounds  No mass, tenderness, or organomegaly  Extremities:  No clubbing, cyanosis, or edema  No calf tenderness  Neuro:  Lethargic but arousable  Moves all limbs  Skin:  Warm and dry      LABS:   No new labs at present      Patient Active Problem List   Diagnosis    Lactic acidosis    Multifocal pneumonia    Multiple myeloma (HCC)    Hypertension    GERD (gastroesophageal reflux disease)    Thrombocytopenia (HCC)    Asthma       Assessment/Plan:  1  Multifocal pneumonia  2  Multiple myeloma  3  Lactic acidosis on admission, improved  4  Hypertension  5  Lethargy, etiology uncertain at present  6  Asthma    The patient's mental status is distinctly worse than it was yesterday  This could be related to medications  She is on morphine and gabapentin  Metabolic causes, especially hypercapnia need to be excluded  Repeat chest x-ray, blood gas, and lab work have been ordered  Pulmonary consultation has been requested        VTE Pharmacologic Prophylaxis: Reason for no pharmacologic prophylaxis Thrombocytopenia  VTE Mechanical Prophylaxis: sequential compression device

## 2019-01-12 NOTE — PROGRESS NOTES
Vancomycin Assessment    Ruby Funk is a 58 y o  female who is currently receiving vancomycin 1250 mg IV q24h for Pneumonia     Relevant clinical data and objective history reviewed:  Creatinine   Date Value Ref Range Status   01/11/2019 0 97 0 60 - 1 30 mg/dL Final     Comment:     Standardized to IDMS reference method   01/10/2019 1 41 (H) 0 60 - 1 30 mg/dL Final     Comment:     Standardized to IDMS reference method     /80 (BP Location: Left arm)   Pulse 86   Temp (!) 97 2 °F (36 2 °C) (Tympanic)   Resp 21   Ht 5' (1 524 m)   Wt 77 1 kg (169 lb 15 6 oz)   SpO2 94%   BMI 33 20 kg/m²   I/O last 3 completed shifts: In: 3233 4 [I V :1838 3; IV Piggyback:1395 1]  Out: -   Lab Results   Component Value Date/Time    BUN 38 (H) 01/11/2019 05:55 AM    WBC 5 76 01/11/2019 05:55 AM    HGB 8 8 (L) 01/11/2019 05:55 AM    HCT 29 2 (L) 01/11/2019 05:55 AM     (H) 01/11/2019 05:55 AM    PLT 21 (LL) 01/11/2019 05:55 AM     Temp Readings from Last 3 Encounters:   01/12/19 (!) 97 2 °F (36 2 °C) (Tympanic)     Vancomycin Days of Therapy: 3    Assessment/Plan  The patient is currently on vancomycin utilizing scheduled dosing  Baseline risks associated with therapy include: pre-existing renal impairment, advanced age and dehydration  The patient is receiving 1250 mg IV q24h with improving renal function  Based on a goal of 15-20 (appropriate for most indications) and after clinical evaluation, the dose will be changed to 500mg IV q12h   Pharmacy will continue to follow closely for s/sx of nephrotoxicity, infusion reactions and appropriateness of therapy  BMP and CBC will be ordered per protocol  Plan for trough as patient approaches steady state, prior to the 2000 dose on 1/13/19  Pharmacy will continue to follow the patients culture results and clinical progress daily      Tianna Jordan, Pharmacist

## 2019-01-12 NOTE — PROGRESS NOTES
Pt's pulse ox found to be 79% on 4 L at 1500 vitals  Pt appears to be lethargic, responding to voice only, drifting to sleep during conversation  Delayed responses to questions, but oriented to person, place and time  02 increased to 5 L, pulse ox reading 88%  Pt ambulated to the bathroom, noticed increased spasticity with muscle movements and pt barely able to ambulate back to bed assist x2  Respiratory called to give PRN albuterol treatment and Dr J Luis Herring notified and came to see patient  Waiting for further orders

## 2019-01-12 NOTE — PLAN OF CARE
DISCHARGE PLANNING     Discharge to home or other facility with appropriate resources Progressing        DISCHARGE PLANNING - CARE MANAGEMENT     Discharge to post-acute care or home with appropriate resources Progressing        INFECTION - ADULT     Absence or prevention of progression during hospitalization Progressing     Absence of fever/infection during neutropenic period Progressing        Knowledge Deficit     Patient/family/caregiver demonstrates understanding of disease process, treatment plan, medications, and discharge instructions Progressing        METABOLIC, FLUID AND ELECTROLYTES - ADULT     Electrolytes maintained within normal limits Progressing     Fluid balance maintained Progressing        PAIN - ADULT     Verbalizes/displays adequate comfort level or baseline comfort level Progressing        Potential for Falls     Patient will remain free of falls Progressing        Prexisting or High Potential for Compromised Skin Integrity     Skin integrity is maintained or improved Progressing        RESPIRATORY - ADULT     Achieves optimal ventilation and oxygenation Progressing        SAFETY ADULT     Maintain or return to baseline ADL function Progressing     Maintain or return mobility status to optimal level Progressing

## 2019-01-13 LAB
ATRIAL RATE: 158 BPM
P AXIS: 47 DEGREES
PR INTERVAL: 122 MS
PROCALCITONIN SERPL-MCNC: 13.26 NG/ML
QRS AXIS: 38 DEGREES
QRSD INTERVAL: 74 MS
QT INTERVAL: 258 MS
QTC INTERVAL: 418 MS
T WAVE AXIS: 53 DEGREES
VANCOMYCIN TROUGH SERPL-MCNC: 9.4 UG/ML (ref 10–20)
VENTRICULAR RATE: 158 BPM

## 2019-01-13 PROCEDURE — 94660 CPAP INITIATION&MGMT: CPT

## 2019-01-13 PROCEDURE — 93010 ELECTROCARDIOGRAM REPORT: CPT | Performed by: INTERNAL MEDICINE

## 2019-01-13 PROCEDURE — 87081 CULTURE SCREEN ONLY: CPT | Performed by: INTERNAL MEDICINE

## 2019-01-13 PROCEDURE — 87070 CULTURE OTHR SPECIMN AEROBIC: CPT | Performed by: INTERNAL MEDICINE

## 2019-01-13 PROCEDURE — 99232 SBSQ HOSP IP/OBS MODERATE 35: CPT | Performed by: INTERNAL MEDICINE

## 2019-01-13 PROCEDURE — 87205 SMEAR GRAM STAIN: CPT | Performed by: INTERNAL MEDICINE

## 2019-01-13 PROCEDURE — 84145 PROCALCITONIN (PCT): CPT | Performed by: INTERNAL MEDICINE

## 2019-01-13 PROCEDURE — 99255 IP/OBS CONSLTJ NEW/EST HI 80: CPT | Performed by: INTERNAL MEDICINE

## 2019-01-13 PROCEDURE — 80202 ASSAY OF VANCOMYCIN: CPT | Performed by: INTERNAL MEDICINE

## 2019-01-13 RX ORDER — VANCOMYCIN HYDROCHLORIDE 1 G/200ML
1000 INJECTION, SOLUTION INTRAVENOUS EVERY 24 HOURS
Status: DISCONTINUED | OUTPATIENT
Start: 2019-01-13 | End: 2019-01-14 | Stop reason: DRUGHIGH

## 2019-01-13 RX ADMIN — ACYCLOVIR 400 MG: 200 CAPSULE ORAL at 08:02

## 2019-01-13 RX ADMIN — AMLODIPINE BESYLATE 2.5 MG: 2.5 TABLET ORAL at 08:02

## 2019-01-13 RX ADMIN — METRONIDAZOLE 500 MG: 500 INJECTION, SOLUTION INTRAVENOUS at 12:57

## 2019-01-13 RX ADMIN — METOPROLOL SUCCINATE 100 MG: 100 TABLET, EXTENDED RELEASE ORAL at 08:02

## 2019-01-13 RX ADMIN — ACETAMINOPHEN 650 MG: 325 TABLET, FILM COATED ORAL at 06:13

## 2019-01-13 RX ADMIN — METRONIDAZOLE 500 MG: 500 INJECTION, SOLUTION INTRAVENOUS at 20:35

## 2019-01-13 RX ADMIN — CHLORHEXIDINE GLUCONATE 0.12% ORAL RINSE 15 ML: 1.2 LIQUID ORAL at 08:02

## 2019-01-13 RX ADMIN — CEFEPIME HYDROCHLORIDE 2000 MG: 2 INJECTION, POWDER, FOR SOLUTION INTRAVENOUS at 23:34

## 2019-01-13 RX ADMIN — VANCOMYCIN HYDROCHLORIDE 500 MG: 500 INJECTION, SOLUTION INTRAVENOUS at 07:52

## 2019-01-13 RX ADMIN — CHLORHEXIDINE GLUCONATE 0.12% ORAL RINSE 15 ML: 1.2 LIQUID ORAL at 18:04

## 2019-01-13 RX ADMIN — PANTOPRAZOLE SODIUM 40 MG: 40 TABLET, DELAYED RELEASE ORAL at 07:05

## 2019-01-13 RX ADMIN — VANCOMYCIN HYDROCHLORIDE 1000 MG: 1 INJECTION, SOLUTION INTRAVENOUS at 21:30

## 2019-01-13 RX ADMIN — FLUTICASONE PROPIONATE 2 SPRAY: 50 SPRAY, METERED NASAL at 08:02

## 2019-01-13 RX ADMIN — CEFEPIME HYDROCHLORIDE 2000 MG: 2 INJECTION, POWDER, FOR SOLUTION INTRAVENOUS at 11:05

## 2019-01-13 RX ADMIN — ACETAMINOPHEN 650 MG: 325 TABLET, FILM COATED ORAL at 12:57

## 2019-01-13 RX ADMIN — GABAPENTIN 300 MG: 300 CAPSULE ORAL at 08:02

## 2019-01-13 NOTE — PROGRESS NOTES
Patient notified nurses that she felt like she could no longer wear her bi pap  She claimed it was giving her a headache from the pressure  Patient is alert,oriented X 4  We educated her on the importance of wearing the bi pap and patient still refused  She is now on 6L nasal canula and we will recheck vitals at 03:00  Patient is resting comfortably with call bell within reach

## 2019-01-13 NOTE — PROGRESS NOTES
Progress Note - Renetta Orr 58 y o  female MRN: [id number]    Unit/Bed#: [facility] [address] 2 [address] [place] [address] 220-01 Encounter: [id number]      Subjective: The patient is more alert today  She does not feel particularly short of breath  She does have some cough  She denies chest pain or hemoptysis  She denies nausea or vomiting  Physical Exam:   Temp:  [97 2 °F (36 2 °C)-97 8 °F (36 6 °C)] 97 4 °F (36 3 °C)  HR:  [80-97] 80  Resp:  [20] 20  BP: (125-155)/(74-86) 155/86    Gen:  Well-developed, well-nourished, in no acute distress  Neck:  Supple  No lymphadenopathy, goiter, or bruit  Heart: The rhythm  No murmur, gallop, or rub  Lungs:  Reduced breath sounds bilaterally  Bilateral rhonchi   Abd:  Soft with active bowel sounds  No mass, tenderness, or organomegaly  Extremities:  Clubbing, cyanosis, or edema  No calf tenderness  Neuro:  Alert and conversant  No focal sign  Skin:  Warm and dry      LABS:  No new labs today    Patient Active Problem List   Diagnosis    Lactic acidosis    Multifocal pneumonia    Multiple myeloma (HCC)    Hypertension    GERD (gastroesophageal reflux disease)    Thrombocytopenia (HCC)    Asthma       Assessment/Plan:  1  Multifocal pneumonia in an immunocompromised patient  2  Acute hypoxic respiratory failure secondary to 1    3  Lactic acidosis on admission, secondary to 2   4  Hypertension  5  Acute toxic encephalopathy secondary to 1 , improved  6  History of asthma  7  Multiple myeloma  8  Thrombocytopenia    Clinically, the patient has improved  Her chest x-ray looks somewhat worse  Pulmonary input is much appreciated  We will repeat her platelet count and transfuse platelets if need be  Her current antibiotics will be continued        VTE Pharmacologic Prophylaxis: Reason for no pharmacologic prophylaxis Thrombocytopenia  VTE Mechanical Prophylaxis: sequential compression device

## 2019-01-13 NOTE — CONSULTS
Consultation - Pulmonary Medicine   Jacque Suarez 58 y o  female MRN: [id number]  Unit/Bed#: [place] [address] [place] 220-01 Encounter: [id number]      Physician Requesting Consult: Dr Raghu Remy   Reason for Consult:  Hypoxia, pneumonia      Assessment:  1  Acute hypoxic respiratory failure secondary to pneumonia most likely  2  Multifocal pneumonia in immunocompromised patient, at risk of resistant gram-negative rods and MRSA  3  Multiple myeloma with immunocompromised state  4  COPD with no acute exacerbation  5  Epistaxis with worsening chronic thrombocytopenia  6  Chronic anemia of chronic disease secondary to multiple myeloma    Plan:   · Continue oxygen titrate for pulse ox  88-92% with the humidification to decrease epistaxis  · Continue antibiotics with cefepime, Flagyl and vancomycin, follow with cultures to deescalate coverage  · Check sputum culture  · Check MRSA swab  · Trend procalcitonin  · Suggest platelets sinus fusion to maintain platelets more than 20 specially with epistaxis  · Encourage out of bed and ambulation with incentive spirometry  · Continue Breo and Spiriva and p r n  Albuterol as home meds  · Outpatient follow-up with her own pulmonologist at [facility] after discharge    ______________________________________________________________________    HPI:    Jacque Suarez is a 58 y o  female who presents with 3 days history of shortness of breath and productive cough of green sputum without any fever or chills, denies hemoptysis  she has oxygen at home that she uses p r n  And she start using her oxygen but continued to have shortness of breath, her present call 911 and the paramedics brought her to the emergency room    Patient has history of COPD and she is currently on Spiriva, Breo and PRN ProAir at home and she follows with Pulmonary at [facility]   She also has multiple myeloma diagnosed 3 years ago when she was on induction treatment with Velcade, Revlimid and Decadron and for the past year she has been on maintenance therapy with Velcade at [facility] every few weeks  Patient has chronic anemia and the last transfusion was few years ago  She also has chronic thrombocytopenia and she gets recurrent platelets transfusion, last infusion was 2-3 weeks ago  She follows with Oncology at [facility]   She denies any aspiration or choking with food, denies any dysphagia, denies any acid reflux  She denies any diarrhea  Patient smoked cigarettes in the past and quit 3 years ago  She lives with her   Review of Systems:  12 points Full review of systems was performed  Aside from what was mentioned in the HPI, it is otherwise negative  Historical Information   Past Medical History:   Diagnosis Date    Anemia     Asthma     Chronic constipation     Chronic pain     COPD (chronic obstructive pulmonary disease) (HCC)     CTS (carpal tunnel syndrome)     DJD (degenerative joint disease)     GERD (gastroesophageal reflux disease)     Hypertension     Multiple myeloma (HCC)     SAMIR (obstructive sleep apnea)     Osteonecrosis (HCC)     Sciatica     Thoracic aortic aneurysm (HCC)      Past Surgical History:   Procedure Laterality Date    BONE MARROW BIOPSY      COLONOSCOPY      PORTACATH PLACEMENT      REDUCTION MAMMAPLASTY      VERTEBROPLASTY       Social History   History   Alcohol Use No     History   Smoking Status    Former Smoker   Smokeless Tobacco    Never Used       Occupational history:  No acute patient exposure    Family History:   History reviewed  No pertinent family history  Medications: The patient's active and prehospital medications were reviewed      Current Facility-Administered Medications:  acetaminophen 650 mg Oral Q6H PRN Cristian Abi, PA-C    acyclovir 400 mg Oral Daily Cristian Abi, PA-C    albuterol 2 5 mg Nebulization Q6H PRN Cristian Abi, PA-C    amLODIPine 2 5 mg Oral Daily Cristian Abi, PA-C aspirin 81 mg Oral Daily Shant Nj, PA-C    cefepime 2,000 mg Intravenous Q12H Shant Nj, PA-C Last Rate: Stopped (01/13/19 1135)   chlorhexidine 15 mL Mouth/Throat BID Shant Nj, PA-LOLA    fluticasone 2 spray Nasal Daily Shant Nj, PA-C    gabapentin 300 mg Oral Daily Shant Nj, PA-C    metoprolol succinate 100 mg Oral Daily Shant Nj, PA-LOLA    metroNIDAZOLE 500 mg Intravenous Q8H Andrew Damon MD Last Rate: 500 mg (01/13/19 1257)   morphine 15 mg Oral Q6H PRN Stanford Guido MD    ondansetron 4 mg Intravenous Q6H PRN Shant Nj, PA-LOLA    pantoprazole 40 mg Oral Early Morning Shant Nj, PA-C    potassium chloride 20 mEq Oral BID Shant Nj, PA-C    senna 43 mg Oral Daily Shant Nj, PA-LOLA    sodium chloride 50 mL/hr Intravenous Continuous Stanfordamari Guido MD Last Rate: Stopped (01/13/19 1257)   vancomycin 10 mg/kg (Adjusted) Intravenous Q12H Stanford Guido MD Last Rate: Stopped (01/13/19 1649)         PhysicalExamination:  Vitals:   Vitals:    01/12/19 1610 01/12/19 1800 01/12/19 2322 01/13/19 0700   BP:   138/84 125/74   BP Location:   Left arm Left arm   Pulse:   86 97   Resp:   20 20   Temp:   97 8 °F (36 6 °C) (!) 97 2 °F (36 2 °C)   TempSrc:   Temporal Tympanic   SpO2: (!) 88% 97% 95% 96%   Weight:       Height:         Temp  Min: 96 7 °F (35 9 °C)  Max: 101 7 °F (38 7 °C)  IBW: 45 5 kg    SpO2: 96 %,   SpO2 Activity: At Rest,   O2 Device: Nasal cannula    General: alert, mild respiratory distress  HEENT: PERRL, no icteric sclera or cyanosis, no thrush, noticed bright red blood losing from both nostrils  Neck:  Supple, no lymphadenopathy or thyromegaly, no JVD  Lungs:  Equal breath sounds with bilateral crackles, no wheezing  Heart: S1S2 regular, no murmures or gallops  Abdomen: soft, non-tender, bowel sounds  present  Extrimities: no edema, no clubbing or cyanosis  Neuro: Alert and oriented x 3, no focal neurodeficits   Skin: intact, no rashes    Diagnostic Data:  CBC:     Results from last 7 days  Lab Units 01/12/19  1619 01/11/19  0555 01/10/19  2159 01/10/19  2151   WBC Thousand/uL 6 90 5 76  --  4 82   HEMOGLOBIN g/dL 8 3* 8 8*  --  10 7*   I STAT HEMOGLOBIN g/dl  --   --  10 5*  --    HEMATOCRIT % 27 3* 29 2*  --  35 5   HEMATOCRIT, ISTAT %  --   --  31*  --    PLATELETS Thousands/uL 16* 21*  --  29*       CMP:     Results from last 7 days  Lab Units 01/12/19  1619 01/11/19  0555 01/10/19  2159 01/10/19  2151   POTASSIUM mmol/L 3 5 3 9  --  4 2   CHLORIDE mmol/L 105 107  --  100   CO2 mmol/L 26 25  --  22   CO2, I-STAT mmol/L  --   --  24  --    BUN mg/dL 17 38*  --  47*   CREATININE mg/dL 0 70 0 97  --  1 41*   CALCIUM mg/dL 9 1 9 3  --  10 5*   ALK PHOS U/L  --   --   --  100   ALT U/L  --   --   --  21   AST U/L  --   --   --  20   GLUCOSE, ISTAT mg/dl  --   --  259*  --      PT/INR:   No results found for: PT, INR,     Microbiology:    Results from last 7 days  Lab Units 01/11/19  0013 01/10/19  2210 01/10/19  2152   BLOOD CULTURE   --  No Growth at 48 hrs  No Growth at 48 hrs  INFLUENZA B PCR  None Detected  --   --    RSV PCR  None Detected  --   --         ABG: Lab Results   Component Value Date    PHART 7 370 01/12/2019    DHI0VNX 47 9 (H) 01/12/2019    PO2ART 52 3 (LL) 01/12/2019    GMA1FXE 27 1 01/12/2019    BEART 1 4 01/12/2019    SOURCE Radial, Right 01/12/2019       Imaging:  Chest x-ray reviewed on PACs:  Bilateral alveolar infiltrates right more than left, no pleural effusions  Chest CT scan from 2 days ago reviewed on PACs:  By basilar infiltrate/consolidation with no pleural effusion    Cardiac lab/EKG/telemetry/ECHO:     Results from last 7 days  Lab Units 01/10/19  2151   TROPONIN I ng/mL <0 02            Code Status: Level 1 - Full Code    Abby Sanders MD    Portions of the record may have been created with voice recognition software   Occasional wrong word or "sound a like" substitutions may have occurred due to the inherent limitations of voice recognition software  Read the chart carefully and recognize, using context, where substitutions have occurred

## 2019-01-13 NOTE — PROGRESS NOTES
Vancomycin IV Pharmacy-to-Dose Consultation    Rishabh Villafana is a 58 y o  female who is currently receiving Vancomycin IV with management by the Pharmacy Consult service  Assessment/Plan:  The patient was reviewed  Renal function is stable and no signs or symptoms of nephrotoxicity and/or infusion reactions were documented in the chart  Based on todays assessment, continue current vancomycin (day # 4) dosing of 500mg every 12 hours, with a plan for trough to be drawn approximately at 2000 on 1/13/19  We will continue to follow the patients culture results and clinical progress daily      Alex Fox,Pharmacist

## 2019-01-14 LAB
ABO GROUP BLD: NORMAL
ANION GAP SERPL CALCULATED.3IONS-SCNC: 7 MMOL/L (ref 4–13)
BASOPHILS # BLD AUTO: 0.01 THOUSANDS/ΜL (ref 0–0.1)
BASOPHILS NFR BLD AUTO: 0 % (ref 0–1)
BLD GP AB SCN SERPL QL: POSITIVE
BUN SERPL-MCNC: 13 MG/DL (ref 5–25)
CALCIUM SERPL-MCNC: 9.1 MG/DL (ref 8.3–10.1)
CHLORIDE SERPL-SCNC: 101 MMOL/L (ref 100–108)
CO2 SERPL-SCNC: 32 MMOL/L (ref 21–32)
CREAT SERPL-MCNC: 0.61 MG/DL (ref 0.6–1.3)
EOSINOPHIL # BLD AUTO: 0.01 THOUSAND/ΜL (ref 0–0.61)
EOSINOPHIL NFR BLD AUTO: 0 % (ref 0–6)
ERYTHROCYTE [DISTWIDTH] IN BLOOD BY AUTOMATED COUNT: 14.8 % (ref 11.6–15.1)
GFR SERPL CREATININE-BSD FRML MDRD: 96 ML/MIN/1.73SQ M
GLUCOSE SERPL-MCNC: 117 MG/DL (ref 65–140)
HCT VFR BLD AUTO: 24 % (ref 34.8–46.1)
HGB BLD-MCNC: 7.5 G/DL (ref 11.5–15.4)
IMM GRANULOCYTES # BLD AUTO: 0.08 THOUSAND/UL (ref 0–0.2)
IMM GRANULOCYTES NFR BLD AUTO: 2 % (ref 0–2)
LYMPHOCYTES # BLD AUTO: 0.26 THOUSANDS/ΜL (ref 0.6–4.47)
LYMPHOCYTES NFR BLD AUTO: 5 % (ref 14–44)
MAGNESIUM SERPL-MCNC: 1.7 MG/DL (ref 1.6–2.6)
MCH RBC QN AUTO: 32.8 PG (ref 26.8–34.3)
MCHC RBC AUTO-ENTMCNC: 31.3 G/DL (ref 31.4–37.4)
MCV RBC AUTO: 105 FL (ref 82–98)
MONOCYTES # BLD AUTO: 0.38 THOUSAND/ΜL (ref 0.17–1.22)
MONOCYTES NFR BLD AUTO: 7 % (ref 4–12)
NEUTROPHILS # BLD AUTO: 4.64 THOUSANDS/ΜL (ref 1.85–7.62)
NEUTS SEG NFR BLD AUTO: 86 % (ref 43–75)
NRBC BLD AUTO-RTO: 0 /100 WBCS
PLATELET # BLD AUTO: 11 THOUSANDS/UL (ref 149–390)
PMV BLD AUTO: 8.5 FL (ref 8.9–12.7)
POTASSIUM SERPL-SCNC: 2.5 MMOL/L (ref 3.5–5.3)
PROCALCITONIN SERPL-MCNC: 7.51 NG/ML
RBC # BLD AUTO: 2.29 MILLION/UL (ref 3.81–5.12)
RH BLD: POSITIVE
SODIUM SERPL-SCNC: 140 MMOL/L (ref 136–145)
SPECIMEN EXPIRATION DATE: NORMAL
WBC # BLD AUTO: 5.38 THOUSAND/UL (ref 4.31–10.16)

## 2019-01-14 PROCEDURE — 99232 SBSQ HOSP IP/OBS MODERATE 35: CPT | Performed by: INTERNAL MEDICINE

## 2019-01-14 PROCEDURE — 85025 COMPLETE CBC W/AUTO DIFF WBC: CPT | Performed by: INTERNAL MEDICINE

## 2019-01-14 PROCEDURE — 86901 BLOOD TYPING SEROLOGIC RH(D): CPT | Performed by: INTERNAL MEDICINE

## 2019-01-14 PROCEDURE — P9037 PLATE PHERES LEUKOREDU IRRAD: HCPCS

## 2019-01-14 PROCEDURE — 83735 ASSAY OF MAGNESIUM: CPT | Performed by: INTERNAL MEDICINE

## 2019-01-14 PROCEDURE — 30233R1 TRANSFUSION OF NONAUTOLOGOUS PLATELETS INTO PERIPHERAL VEIN, PERCUTANEOUS APPROACH: ICD-10-PCS | Performed by: INTERNAL MEDICINE

## 2019-01-14 PROCEDURE — 80048 BASIC METABOLIC PNL TOTAL CA: CPT | Performed by: INTERNAL MEDICINE

## 2019-01-14 PROCEDURE — 84145 PROCALCITONIN (PCT): CPT | Performed by: NURSE PRACTITIONER

## 2019-01-14 PROCEDURE — 86850 RBC ANTIBODY SCREEN: CPT | Performed by: INTERNAL MEDICINE

## 2019-01-14 PROCEDURE — 86900 BLOOD TYPING SEROLOGIC ABO: CPT | Performed by: INTERNAL MEDICINE

## 2019-01-14 RX ORDER — POTASSIUM CHLORIDE 20 MEQ/1
40 TABLET, EXTENDED RELEASE ORAL
Status: DISCONTINUED | OUTPATIENT
Start: 2019-01-14 | End: 2019-01-16

## 2019-01-14 RX ORDER — VANCOMYCIN HYDROCHLORIDE 1 G/200ML
1000 INJECTION, SOLUTION INTRAVENOUS EVERY 12 HOURS
Status: DISCONTINUED | OUTPATIENT
Start: 2019-01-14 | End: 2019-01-15

## 2019-01-14 RX ORDER — FLUTICASONE FUROATE AND VILANTEROL 100; 25 UG/1; UG/1
1 POWDER RESPIRATORY (INHALATION) DAILY
Status: DISCONTINUED | OUTPATIENT
Start: 2019-01-14 | End: 2019-01-17 | Stop reason: HOSPADM

## 2019-01-14 RX ORDER — DIPHENHYDRAMINE HCL 25 MG
25 TABLET ORAL ONCE
Status: COMPLETED | OUTPATIENT
Start: 2019-01-14 | End: 2019-01-14

## 2019-01-14 RX ORDER — ACETAMINOPHEN 325 MG/1
650 TABLET ORAL ONCE
Status: COMPLETED | OUTPATIENT
Start: 2019-01-14 | End: 2019-01-14

## 2019-01-14 RX ADMIN — SODIUM CHLORIDE 50 ML/HR: 0.9 INJECTION, SOLUTION INTRAVENOUS at 20:27

## 2019-01-14 RX ADMIN — DIPHENHYDRAMINE HCL 25 MG: 25 TABLET ORAL at 16:05

## 2019-01-14 RX ADMIN — FLUTICASONE FUROATE AND VILANTEROL TRIFENATATE 1 PUFF: 100; 25 POWDER RESPIRATORY (INHALATION) at 10:58

## 2019-01-14 RX ADMIN — METRONIDAZOLE 500 MG: 500 INJECTION, SOLUTION INTRAVENOUS at 05:32

## 2019-01-14 RX ADMIN — ACETAMINOPHEN 650 MG: 325 TABLET, FILM COATED ORAL at 16:05

## 2019-01-14 RX ADMIN — ACYCLOVIR 400 MG: 200 CAPSULE ORAL at 08:10

## 2019-01-14 RX ADMIN — METOPROLOL SUCCINATE 100 MG: 100 TABLET, EXTENDED RELEASE ORAL at 08:26

## 2019-01-14 RX ADMIN — ACETAMINOPHEN 650 MG: 325 TABLET, FILM COATED ORAL at 18:16

## 2019-01-14 RX ADMIN — SODIUM CHLORIDE 50 ML/HR: 0.9 INJECTION, SOLUTION INTRAVENOUS at 00:35

## 2019-01-14 RX ADMIN — CEFEPIME HYDROCHLORIDE 2000 MG: 2 INJECTION, POWDER, FOR SOLUTION INTRAVENOUS at 10:58

## 2019-01-14 RX ADMIN — AMLODIPINE BESYLATE 2.5 MG: 2.5 TABLET ORAL at 08:10

## 2019-01-14 RX ADMIN — FLUTICASONE PROPIONATE 2 SPRAY: 50 SPRAY, METERED NASAL at 08:10

## 2019-01-14 RX ADMIN — VANCOMYCIN HYDROCHLORIDE 1000 MG: 1 INJECTION, SOLUTION INTRAVENOUS at 20:27

## 2019-01-14 RX ADMIN — METRONIDAZOLE 500 MG: 500 INJECTION, SOLUTION INTRAVENOUS at 13:48

## 2019-01-14 RX ADMIN — POTASSIUM CHLORIDE 20 MEQ: 1500 TABLET, EXTENDED RELEASE ORAL at 08:10

## 2019-01-14 RX ADMIN — CEFEPIME HYDROCHLORIDE 2000 MG: 2 INJECTION, POWDER, FOR SOLUTION INTRAVENOUS at 23:14

## 2019-01-14 RX ADMIN — METRONIDAZOLE 500 MG: 500 INJECTION, SOLUTION INTRAVENOUS at 21:57

## 2019-01-14 RX ADMIN — PANTOPRAZOLE SODIUM 40 MG: 40 TABLET, DELAYED RELEASE ORAL at 05:32

## 2019-01-14 RX ADMIN — VANCOMYCIN HYDROCHLORIDE 750 MG: 750 INJECTION, SOLUTION INTRAVENOUS at 08:11

## 2019-01-14 RX ADMIN — POTASSIUM CHLORIDE 40 MEQ: 1500 TABLET, EXTENDED RELEASE ORAL at 16:05

## 2019-01-14 RX ADMIN — ACETAMINOPHEN 650 MG: 325 TABLET, FILM COATED ORAL at 00:30

## 2019-01-14 RX ADMIN — GABAPENTIN 300 MG: 300 CAPSULE ORAL at 08:10

## 2019-01-14 RX ADMIN — ACETAMINOPHEN 650 MG: 325 TABLET, FILM COATED ORAL at 07:13

## 2019-01-14 RX ADMIN — ASPIRIN 81 MG 81 MG: 81 TABLET ORAL at 08:10

## 2019-01-14 NOTE — CONSULTS
Vancomycin Monitoring    Demographics   Name Sandeep Swann   Age / Height / BMI 64 / 5 foot   Weight / Dosing Weight 58 kg adjusted   Creatinine improving  0 61 on 1/14/19  0 7 on 1/12   0 97 on 1/11  1 41 on 1/10   Vanco Days of Therapy 4   Goal Trough Level 15-20   Consult Prescriber Laurie   Additional Notes            Assessment   Current Dose/Freq  SPLIT DOSE  1000 mg q24h PM & 750 q24h AM   Last Through Level LOW 9 4 1/13 2000   Additional Notes            Plan   Dose Adjustments 1000 mg q12h  Due to improving Cr levels   Next Trough 1/16 0745 - prior to 5th dose from last trough   Additional Notes              Pharmacy will continue to follow closely for s/sx of nephrotoxicity, infusion reactions and appropriateness of therapy as well as patients culture results and clinical progress daily until medication is d/ced  Labs will be ordered if needed per protocol     Andrews Malika, NaviD

## 2019-01-14 NOTE — CONSULTS
Vancomycin Monitoring    Demographics   Name Fabiano Males   Age / Height  64 / 5 foot    Weight / Dosing Weight 58 kg adjusted   Creatinine Improving   0 7 on 1/12 PM   0 97 on 1/11  1 41 on 1/10   Vanco Days of Therapy 3   Goal Trough Level 15-20   Consult Prescriber Laurie   Additional Notes            Assessment   Current Dose/Freq  500 q12h   Last Trough Level LOW 9 4 1/13 2000   Additional Notes Trough was drawn an appropriate time           Plan   Dose Adjustments TWO DOSES  1000 mg q24h PM DOSE  750 mg q24h AM DOSE   Next Trough 1/16 0745 prior to 5th dose   Additional Notes Consider changing to 1000 mg q12h if CR improves again in the morning of 1-14 or the morning of 1-15             Pharmacy will continue to follow closely for s/sx of nephrotoxicity, infusion reactions and appropriateness of therapy as well as patients culture results and clinical progress daily until medication is d/ced  Labs will be ordered if needed per protocol     Arabella Gonzalez, NaviD

## 2019-01-14 NOTE — PROGRESS NOTES
Progress Note - Pulmonary   Meryle Nestle 58 y o  female MRN: [id number]  Unit/Bed#: [place] [facility] 2 -01 Encounter: [id number]    Assessment/Plan:      1  Acute on chronic hypoxic respiratory failure secondary to pneumonia  1  Continue to titrate oxygen to maintain oxygen saturations greater than or equal to 88%  2  Wears oxygen at 2L NC as needed at home  3  Continue antibiotics with cefepime, Flagyl, and Vancomycin pending results of cultures   4  Pulmonary toilet: incentive spirometry, OOB as tolerated, increase activity as tolerated  2  Multifocal pneumonia in an immunocompromised patient  1  Continue antibiotics as above  2  Sputum culture pending  3  MRSA culture pending  4  Repeat procalcitonin pending  Last procalcitonin was 13 26   3  Multiple myeloma with immunocompromised state  1  Treatment per primary team  4  COPD without acute exacerbation  1  Patient take Breo and albuterol rescue inhaler as needed at home  2   Add Breo 1 puff daily   3  Pulmonary toilet as above  5  Epistaxis with worsening chronic thrombocytopenia  1  Epistaxis improving  2  Repeat platelet count   3  Suggest platelet replacement as platelets are down from 16 to 11 with today's draw  6  Chronic anemia of chronic secondary to multiple myeloma  1  Treatment per primary team     Chief Complaint:    I am tired    Subjective:    Lele Temple is a 57 yo  female seen today laying in bed with HOB at 30°  She is currently wearing 6 L nasal cannula  She states she is feeling more fatigued today than yesterday  States her shortness of breath has improved she as well as her nose bleeds  She continues to have a productive cough of yellow to white sputum  Denies fevers, chills, chest pain, wheeze, vomiting, leg pain, leg swelling  She had one episode of abdominal pain associated with nausea this morning which has now resolved  I decreased her oxygen to 2 L while in the room and her saturations remained above 95%    She wears 2 L of oxygen at home as needed  She admits to me that she rarely takes her Breo or her albuterol rescue inhaler at home  Objective:    Vitals: Blood pressure 144/89, pulse 79, temperature (!) 96 9 °F (36 1 °C), temperature source Temporal, resp  rate 19, height 5' (1 524 m), weight 77 1 kg (169 lb 15 6 oz), SpO2 97 %  2L NC,Body mass index is 33 2 kg/m²  Intake/Output Summary (Last 24 hours) at 01/14/19 0946  Last data filed at 01/14/19 0900   Gross per 24 hour   Intake          1329 16 ml   Output             2200 ml   Net          -870 84 ml       Invasive Devices     Peripheral Intravenous Line            Peripheral IV 01/13/19 Left Forearm less than 1 day                Physical Exam:     Physical Exam   Constitutional: She is oriented to person, place, and time  She appears well-developed and well-nourished  No distress  HENT:   Head: Normocephalic and atraumatic  Neck: No JVD present  No tracheal deviation present  Cardiovascular: Normal rate, regular rhythm and normal heart sounds  Exam reveals no friction rub  No murmur heard  Pulmonary/Chest: Effort normal  No respiratory distress  She has no wheezes  She has rales (bibasilar)  Abdominal: Soft  Bowel sounds are normal  She exhibits no distension  There is no tenderness  Musculoskeletal: She exhibits no edema or deformity  Neurological: She is alert and oriented to person, place, and time  Somnolent but easily aroused    Skin: Skin is warm and dry  Psychiatric: She has a normal mood and affect  Her behavior is normal        Labs: I have personally reviewed pertinent lab results  , ABG: No results found for: PHART, CFR1KNU, PO2ART, IGN6UIP, H8JJJFZP, BEART, SOURCE, BNP: No results found for: BNP, CBC:   Lab Results   Component Value Date    WBC 5 38 01/14/2019    HGB 7 5 (L) 01/14/2019    HCT 24 0 (L) 01/14/2019     (H) 01/14/2019    PLT 11 (LL) 01/14/2019    MCH 32 8 01/14/2019    MCHC 31 3 (L) 01/14/2019    RDW 14 8 01/14/2019    MPV 8 5 (L) 01/14/2019    NRBC 0 01/14/2019   , CMP:   Lab Results   Component Value Date    SODIUM 140 01/14/2019    K 2 5 (LL) 01/14/2019     01/14/2019    CO2 32 01/14/2019    BUN 13 01/14/2019    CREATININE 0 61 01/14/2019    CALCIUM 9 1 01/14/2019    EGFR 96 01/14/2019   , PT/INR: No results found for: PT, INR, Troponin: No results found for: TROPONINI     Procalcitonin pending  Sputum culture and Gram stain pending  MRSA culture pending    Imaging and other studies: I have personally reviewed pertinent reports  and I have personally reviewed pertinent films in PACS     Chest x-ray portable 01/12/2019  Bibasilar pleural effusions right greater than left  Worsening infiltrate the left lower lobe with stable right infiltrate

## 2019-01-14 NOTE — UTILIZATION REVIEW
Continued Stay Review    Date: 01/14/19  Vital Signs: /89 (BP Location: Right arm)   Pulse 79   Temp (!) 96 9 °F (36 1 °C) (Temporal)   Resp 19   Ht 5' (1 524 m)   Wt 77 1 kg (169 lb 15 6 oz)   SpO2 97%   BMI 33 20 kg/m²   Assessment/plan  1  Acute hypoxic respiratory failure due to multifocal pneumonia- appreciate pulmonary recommendations  Pt improving on cefepime, flagyl and vanco  Day 5 of cefepime, day 2 of flagyl and day 3 of vanco  Awaiting repeat procalcitonin level, mrsa culture, and sputum culture to help decide about continuation of vanco  Blood cultures are negative so far  Continue to wean oxygen as tolerated       2  Lactic acidosis on admission, secondary to 1- resolved       3  Hypertension- stable  Continue current treatment     4  Acute toxic encephalopathy secondary to 1- resolved     5  Copd with out exacerbation- continue current treatment per pulmonary     6  Multiple myeloma- pt scheduled for chemo this Wednesday  Likely will need to reschedule due to number 1  Follow up with oncology outpt  Will not order labs for chemo at this time       7  Thrombocytopenia/anemia- due to number 6  Will transfuse one unit of platelets due to decreasing in platelet count and epistaxis  Blood consent on chart  Will give tylenol and benadryl prior     8  Epistaxis due to number 7- please see number 7     9  Hypokalemia- will increase potassium to 40meq tid   Will check mg  Medications:   Scheduled Meds:   Current Facility-Administered Medications:  acetaminophen 650 mg Oral Q6H PRN   acetaminophen 650 mg Oral Once   acyclovir 400 mg Oral Daily   albuterol 2 5 mg Nebulization Q6H PRN   amLODIPine 2 5 mg Oral Daily   aspirin 81 mg Oral Daily   cefepime 2,000 mg Intravenous Q12H   chlorhexidine 15 mL Mouth/Throat BID   diphenhydrAMINE 25 mg Oral Once   fluticasone 2 spray Nasal Daily   fluticasone-vilanterol 1 puff Inhalation Daily   gabapentin 300 mg Oral Daily   metoprolol succinate 100 mg Oral Daily metroNIDAZOLE 500 mg Intravenous Q8H   morphine 15 mg Oral Q6H PRN   ondansetron 4 mg Intravenous Q6H PRN   pantoprazole 40 mg Oral Early Morning   potassium chloride 40 mEq Oral TID With Meals   senna 43 mg Oral Daily   sodium chloride 50 mL/hr Intravenous Continuous   vancomycin 1,000 mg Intravenous Q12H     Continuous Infusions:   sodium chloride 50 mL/hr Last Rate: 50 mL/hr (01/14/19 0035)     PRN Meds:   acetaminophen    albuterol    morphine    ondansetron  Pertinent Labs/Diagnostic Results: hgb 7 5, hct 24 0, plts 11, k 2 5  Age/Sex: 58 y o  female   Discharge Plan: tbd    [address] [facility] Utilization Review Department  Phone: [phone]; Fax [phone]  [email or web address]  ATTENTION: Please call with any questions or concerns to [phone]  and carefully listen to the prompts so that you are directed to the right person  Send all requests for admission clinical reviews, approved or denied determinations and any other requests to fax [phone]   All voicemails are confidential

## 2019-01-14 NOTE — PROGRESS NOTES
[facility] Progress Note  Patient: Veronica Condon 58 y o  female   MRN: [id number]  PCP: No primary care provider on file  Unit/Bed#: [place] [facility] 2 -01 Encounter: [id number]  Date Of Visit: 01/14/19      Assessment/plan  1  Acute hypoxic respiratory failure due to multifocal pneumonia- appreciate pulmonary recommendations  Pt improving on cefepime, flagyl and vanco  Day 5 of cefepime, day 2 of flagyl and day 3 of vanco  Awaiting repeat procalcitonin level, mrsa culture, and sputum culture to help decide about continuation of vanco  Blood cultures are negative so far  Continue to wean oxygen as tolerated  2  Lactic acidosis on admission, secondary to 1- resolved  3  Hypertension- stable  Continue current treatment    4  Acute toxic encephalopathy secondary to 1- resolved    5  Copd with out exacerbation- continue current treatment per pulmonary    6  Multiple myeloma- pt scheduled for chemo this Wednesday  Likely will need to reschedule due to number 1  Follow up with oncology outpt  Will not order labs for chemo at this time  7  Thrombocytopenia/anemia- due to number 6  Will transfuse one unit of platelets due to decreasing in platelet count and epistaxis  Blood consent on chart  Will give tylenol and benadryl prior    8  Epistaxis due to number 7- please see number 7    9  Hypokalemia- will increase potassium to 40meq tid  Will check mg    Subjective:   Pt seen and examined  Pt states she is breathing better  We discussed the blood consent and transfusion  She is still having small nose bleeds at times  No f/c no cp no worsening sob  No n/v/d no abd pain    Objective:     Vitals: Blood pressure 144/89, pulse 79, temperature (!) 96 9 °F (36 1 °C), temperature source Temporal, resp  rate 19, height 5' (1 524 m), weight 77 1 kg (169 lb 15 6 oz), SpO2 97 %  ,Body mass index is 33 2 kg/m²      Lab, Imaging and other studies:    Results from last 7 days  Lab Units 01/14/19  0548  01/10/19  3615 WBC Thousand/uL 5 38  < >  --    HEMOGLOBIN g/dL 7 5*  < >  --    I STAT HEMOGLOBIN   --   < >  --    HEMATOCRIT % 24 0*  < >  --    HEMATOCRIT, ISTAT   --   < >  --    PLATELETS Thousands/uL 11*  < >  --    INR   --   --  1 25*   < > = values in this interval not displayed  Results from last 7 days  Lab Units 01/14/19  0548  01/10/19  2159 01/10/19  2151   POTASSIUM mmol/L 2 5*  < >  --  4 2   CHLORIDE mmol/L 101  < >  --  100   CO2 mmol/L 32  < >  --  22   CO2, I-STAT mmol/L  --   --  24  --    BUN mg/dL 13  < >  --  47*   CREATININE mg/dL 0 61  < >  --  1 41*   CALCIUM mg/dL 9 1  < >  --  10 5*   ALK PHOS U/L  --   --   --  100   ALT U/L  --   --   --  21   AST U/L  --   --   --  20   GLUCOSE, ISTAT mg/dl  --   --  259*  --    < > = values in this interval not displayed      Results from last 7 days  Lab Units 01/10/19  2151   TROPONIN I ng/mL <0 02     Lab Results   Component Value Date    BLOODCX No Growth at 72 hrs  01/10/2019    BLOODCX No Growth at 72 hrs  01/10/2019    SPUTUMCULTUR Culture too young- will reincubate 01/13/2019     Scheduled Meds:   Current Facility-Administered Medications:  acetaminophen 650 mg Oral Q6H PRN Michele Jung, PA-C    acyclovir 400 mg Oral Daily Michele Jung, PA-C    albuterol 2 5 mg Nebulization Q6H PRN Michele Jung, PA-C    amLODIPine 2 5 mg Oral Daily Michele Jung, PA-C    aspirin 81 mg Oral Daily Michele Jung, PA-C    cefepime 2,000 mg Intravenous Q12H Michele Jung, PA-C Last Rate: 2,000 mg (01/14/19 1058)   chlorhexidine 15 mL Mouth/Throat BID Michele Jung, PA-C    fluticasone 2 spray Nasal Daily Michele Jung, PA-C    fluticasone-vilanterol 1 puff Inhalation Daily Scooter Silva PA-C    gabapentin 300 mg Oral Daily Michele Feng PA-C    metoprolol succinate 100 mg Oral Daily Michele Feng PA-C    metroNIDAZOLE 500 mg Intravenous Q8H Kameron Duval MD Last Rate: 500 mg (01/14/19 0532)   morphine 15 mg Oral Q6H PRN Javon Boggs MD ondansetron 4 mg Intravenous Q6H PRN Cristian Abi, PA-C    pantoprazole 40 mg Oral Early Morning Cristian Abi, PA-C    potassium chloride 40 mEq Oral TID With Meals DO kya Schulerna 43 mg Oral Daily Cristian Abi, PA-C    sodium chloride 50 mL/hr Intravenous Continuous Scot Mathis MD Last Rate: 50 mL/hr (01/14/19 0035)   vancomycin 1,000 mg Intravenous Q24H Cristian Abi, PA-C Last Rate: 1,000 mg (01/13/19 2130)   vancomycin 750 mg Intravenous Q24H Cristian Abi, PA-C Last Rate: 750 mg (01/14/19 5262)     Continuous Infusions:   sodium chloride 50 mL/hr Last Rate: 50 mL/hr (01/14/19 0035)     PRN Meds:   acetaminophen    albuterol    morphine    ondansetron      Physical exam:  Physical Exam  General appearance: alert and oriented, in no acute distress  Head: Normocephalic, without obvious abnormality, atraumatic  Eyes: conjunctivae/corneas clear  PERRL, EOM's intact  Fundi benign    Neck: no adenopathy, no carotid bruit, no JVD, supple, symmetrical, trachea midline and thyroid not enlarged, symmetric, no tenderness/mass/nodules  Lungs: rhonchi from middle to lower lobes bilateral  Heart: regular rate and rhythm, S1, S2 normal, no murmur, click, rub or gallop  Abdomen: soft, non-tender; bowel sounds normal; no masses,  no organomegaly  Extremities: extremities normal, warm and well-perfused; no cyanosis, clubbing, or edema  Pulses: 2+ and symmetric  Skin: Skin color, texture, turgor normal  No rashes or lesions  Neurologic: Mental status: Alert, oriented, thought content appropriate      VTE Pharmacologic Prophylaxis: Reason for no pharmacologic prophylaxis thrombocyotopenia  VTE Mechanical Prophylaxis: sequential compression device    Counseling / Coordination of Care  Total floor / unit time spent today 20 minutes       Current Length of Stay: 3 day(s)    Current Patient Status: Inpatient       Code Status: Level 1 - Full Code

## 2019-01-15 LAB
ABO GROUP BLD BPU: NORMAL
ANION GAP SERPL CALCULATED.3IONS-SCNC: 6 MMOL/L (ref 4–13)
BACTERIA SPT RESP CULT: NORMAL
BACTERIA SPT RESP CULT: NORMAL
BPU ID: NORMAL
BUN SERPL-MCNC: 16 MG/DL (ref 5–25)
CALCIUM SERPL-MCNC: 9.1 MG/DL (ref 8.3–10.1)
CHLORIDE SERPL-SCNC: 101 MMOL/L (ref 100–108)
CO2 SERPL-SCNC: 35 MMOL/L (ref 21–32)
CREAT SERPL-MCNC: 0.73 MG/DL (ref 0.6–1.3)
ERYTHROCYTE [DISTWIDTH] IN BLOOD BY AUTOMATED COUNT: 14.9 % (ref 11.6–15.1)
GFR SERPL CREATININE-BSD FRML MDRD: 87 ML/MIN/1.73SQ M
GLUCOSE SERPL-MCNC: 105 MG/DL (ref 65–140)
GRAM STN SPEC: NORMAL
HCT VFR BLD AUTO: 24.7 % (ref 34.8–46.1)
HGB BLD-MCNC: 7.6 G/DL (ref 11.5–15.4)
MCH RBC QN AUTO: 31.9 PG (ref 26.8–34.3)
MCHC RBC AUTO-ENTMCNC: 30.8 G/DL (ref 31.4–37.4)
MCV RBC AUTO: 104 FL (ref 82–98)
MRSA NOSE QL CULT: NORMAL
PLATELET # BLD AUTO: 48 THOUSANDS/UL (ref 149–390)
PMV BLD AUTO: 11.4 FL (ref 8.9–12.7)
POTASSIUM SERPL-SCNC: 2.7 MMOL/L (ref 3.5–5.3)
RBC # BLD AUTO: 2.38 MILLION/UL (ref 3.81–5.12)
SODIUM SERPL-SCNC: 142 MMOL/L (ref 136–145)
UNIT DISPENSE STATUS: NORMAL
UNIT PRODUCT CODE: NORMAL
UNIT RH: NORMAL
WBC # BLD AUTO: 4 THOUSAND/UL (ref 4.31–10.16)

## 2019-01-15 PROCEDURE — 99232 SBSQ HOSP IP/OBS MODERATE 35: CPT | Performed by: INTERNAL MEDICINE

## 2019-01-15 PROCEDURE — 86901 BLOOD TYPING SEROLOGIC RH(D): CPT

## 2019-01-15 PROCEDURE — 86900 BLOOD TYPING SEROLOGIC ABO: CPT

## 2019-01-15 PROCEDURE — 86880 COOMBS TEST DIRECT: CPT

## 2019-01-15 PROCEDURE — 85027 COMPLETE CBC AUTOMATED: CPT | Performed by: INTERNAL MEDICINE

## 2019-01-15 PROCEDURE — 86906 BLD TYPING SEROLOGIC RH PHNT: CPT

## 2019-01-15 PROCEDURE — 86870 RBC ANTIBODY IDENTIFICATION: CPT

## 2019-01-15 PROCEDURE — 86905 BLOOD TYPING RBC ANTIGENS: CPT

## 2019-01-15 PROCEDURE — 80048 BASIC METABOLIC PNL TOTAL CA: CPT | Performed by: INTERNAL MEDICINE

## 2019-01-15 RX ORDER — METRONIDAZOLE 500 MG/1
500 TABLET ORAL EVERY 8 HOURS SCHEDULED
Status: DISCONTINUED | OUTPATIENT
Start: 2019-01-15 | End: 2019-01-15

## 2019-01-15 RX ADMIN — FLUTICASONE FUROATE AND VILANTEROL TRIFENATATE 1 PUFF: 100; 25 POWDER RESPIRATORY (INHALATION) at 08:37

## 2019-01-15 RX ADMIN — VANCOMYCIN HYDROCHLORIDE 1000 MG: 1 INJECTION, SOLUTION INTRAVENOUS at 08:36

## 2019-01-15 RX ADMIN — METRONIDAZOLE 500 MG: 500 INJECTION, SOLUTION INTRAVENOUS at 04:30

## 2019-01-15 RX ADMIN — CEFEPIME HYDROCHLORIDE 2000 MG: 2 INJECTION, POWDER, FOR SOLUTION INTRAVENOUS at 23:54

## 2019-01-15 RX ADMIN — METOPROLOL SUCCINATE 100 MG: 100 TABLET, EXTENDED RELEASE ORAL at 08:38

## 2019-01-15 RX ADMIN — CEFEPIME HYDROCHLORIDE 2000 MG: 2 INJECTION, POWDER, FOR SOLUTION INTRAVENOUS at 12:27

## 2019-01-15 RX ADMIN — AMLODIPINE BESYLATE 2.5 MG: 2.5 TABLET ORAL at 08:37

## 2019-01-15 RX ADMIN — MORPHINE SULFATE 15 MG: 15 TABLET ORAL at 02:54

## 2019-01-15 RX ADMIN — POTASSIUM CHLORIDE 40 MEQ: 1500 TABLET, EXTENDED RELEASE ORAL at 12:27

## 2019-01-15 RX ADMIN — MORPHINE SULFATE 15 MG: 15 TABLET ORAL at 21:54

## 2019-01-15 RX ADMIN — ACETAMINOPHEN 650 MG: 325 TABLET, FILM COATED ORAL at 15:29

## 2019-01-15 RX ADMIN — PANTOPRAZOLE SODIUM 40 MG: 40 TABLET, DELAYED RELEASE ORAL at 06:13

## 2019-01-15 RX ADMIN — CHLORHEXIDINE GLUCONATE 0.12% ORAL RINSE 15 ML: 1.2 LIQUID ORAL at 08:37

## 2019-01-15 RX ADMIN — ASPIRIN 81 MG 81 MG: 81 TABLET ORAL at 08:37

## 2019-01-15 RX ADMIN — POTASSIUM CHLORIDE 40 MEQ: 1500 TABLET, EXTENDED RELEASE ORAL at 18:00

## 2019-01-15 RX ADMIN — CHLORHEXIDINE GLUCONATE 0.12% ORAL RINSE 15 ML: 1.2 LIQUID ORAL at 18:00

## 2019-01-15 RX ADMIN — ACYCLOVIR 400 MG: 200 CAPSULE ORAL at 08:38

## 2019-01-15 RX ADMIN — ACETAMINOPHEN 650 MG: 325 TABLET, FILM COATED ORAL at 00:03

## 2019-01-15 RX ADMIN — GABAPENTIN 300 MG: 300 CAPSULE ORAL at 08:37

## 2019-01-15 RX ADMIN — FLUTICASONE PROPIONATE 2 SPRAY: 50 SPRAY, METERED NASAL at 08:37

## 2019-01-15 RX ADMIN — POTASSIUM CHLORIDE 40 MEQ: 1500 TABLET, EXTENDED RELEASE ORAL at 08:37

## 2019-01-15 NOTE — PROGRESS NOTES
Progress Note - Pulmonary   Adrianna Hernandez 58 y o  female MRN: [id number]  Unit/Bed#: [place] 2 -01 Encounter: [id number]    Assessment/Plan:    1  Acute on chronic hypoxic respiratory failure secondary to pneumonia  1  Currently on 3 L nasal cannula  2  Continue to titrate oxygen to maintain oxygen saturations greater than or equal to 88%  3  Wears oxygen at 2L NC as needed at home  4  Continue antibiotics with cefepime, Flagyl, and Vancomycin pending results of cultures   5  Pulmonary toilet: incentive spirometry, OOB as tolerated, increase activity as tolerated  2  Multifocal pneumonia in an immunocompromised patient  1  Continue antibiotics as above  2  Sputum culture shows 2+ Gram-negative rods, 1+ gram-positive cocci in clusters  3  MRSA culture pending  4  Repeat procalcitonin pending  Last procalcitonin was 13 26   3  Multiple myeloma with immunocompromised state  1  Treatment per primary team  4  COPD without acute exacerbation  1  Patient takes Breo and albuterol rescue inhaler as needed at home  2  Continue Breo 1 puff daily   3  Pulmonary toilet as above  4  Discharge meds: Breo, Atrovent nebs BID, and albuterol rescue inhaler  5  Follows with pulmonary in [facility]  5  Epistaxis with worsening chronic thrombocytopenia  1  Epistaxis improving  Reports none today  2  Platelet count increased to 48 from 11 yesterday   3  Received 1 unit of platelets yesterday  4  Suggest platelet replacement as platelets are down from 16 to 11 with today's draw  6  Chronic anemia of chronic secondary to multiple myeloma  1  Treatment per primary team    Chief Complaint:    Feeling much better    Subjective:    Maegan Mcleod is a 57yo female seen today sitting up in bed  She feels much better compared to yesterday  She appears to be in higher spirits and not as fatigued  She reports her breathing has also been improving  She was able to ambulate on her own to the bathroom without feeling short of breath    She continues to have a cough productive of yellow sputum  She denies dyspnea at rest, wheeze, chest pain, fevers, chills, nausea, vomiting, abdominal pain  Objective:    Vitals: Blood pressure 143/82, pulse 83, temperature 98 9 °F (37 2 °C), temperature source Tympanic, resp  rate 16, height 5' (1 524 m), weight 77 1 kg (169 lb 15 6 oz), SpO2 95 %  3L NCBody mass index is 33 2 kg/m²  Intake/Output Summary (Last 24 hours) at 01/15/19 1116  Last data filed at 01/15/19 0151   Gross per 24 hour   Intake              939 ml   Output             2550 ml   Net            -1611 ml       Invasive Devices     Peripheral Intravenous Line            Peripheral IV 01/13/19 Left Forearm 1 day                Physical Exam:     Physical Exam   Constitutional: She is oriented to person, place, and time  She appears well-developed and well-nourished  No distress  HENT:   Head: Normocephalic and atraumatic  Eyes: Pupils are equal, round, and reactive to light  Neck: No JVD present  No tracheal deviation present  Cardiovascular: Normal rate, regular rhythm and normal heart sounds  Exam reveals no friction rub  No murmur heard  Pulmonary/Chest: No respiratory distress  She has decreased breath sounds (at the bases)  She has no wheezes  She has rales (minimal bibasilar)  She exhibits no tenderness  Abdominal: Soft  Bowel sounds are normal  She exhibits no distension  There is no tenderness  Musculoskeletal: She exhibits no edema or deformity  Neurological: She is alert and oriented to person, place, and time  Skin: Skin is warm and dry  She is not diaphoretic  Psychiatric: She has a normal mood and affect  Her behavior is normal        Labs: I have personally reviewed pertinent lab results  , CBC:   Lab Results   Component Value Date    WBC 4 00 (L) 01/15/2019    HGB 7 6 (L) 01/15/2019    HCT 24 7 (L) 01/15/2019     (H) 01/15/2019    PLT 48 (LL) 01/15/2019    MCH 31 9 01/15/2019    MCHC 30 8 (L) 01/15/2019    RDW 14 9 01/15/2019    MPV 11 4 01/15/2019   , CMP:   Lab Results   Component Value Date    SODIUM 142 01/15/2019    K 2 7 (LL) 01/15/2019     01/15/2019    CO2 35 (H) 01/15/2019    BUN 16 01/15/2019    CREATININE 0 73 01/15/2019    CALCIUM 9 1 01/15/2019    EGFR 87 01/15/2019       Imaging and other studies: None to review as of 1/12/19

## 2019-01-15 NOTE — PROGRESS NOTES
The metronidazole has / have been converted to Oral per [facility] IV-to-PO Auto-Conversion Protocol for Adults as approved by the Pharmacy and Therapeutics Committee  The patient met all eligible criteria:  3 Age = 25years old   2) Received at least one dose of the IV form   3) Receiving at least one other scheduled oral/enteral medication   4) Tolerating an oral/enteral diet   and did not have any exclusions:   1) Critical care patient   2) Active GI bleed (IF assessing H2RAs or PPIs)   3) Continuous tube feeding (IF assessing cipro, doxycycline, levofloxacin, minocycline, rifampin, or voriconazole)   4) Receiving PO vancomycin (IF assessing metronidazole)   5) Persistent nausea and/or vomiting   6) Ileus or gastrointestinal obstruction   7) [place]/nasogastric tube set for continuous suction   8) Specific order not to automatically convert to PO (in the order's comments or if discussed in the most recent Infectious Disease or primary team's progress notes)

## 2019-01-15 NOTE — PROGRESS NOTES
[facility] Internal Medicine Progress Note  Patient: Manuel Brandon 58 y o  female   MRN: [id number]  PCP: No primary care provider on file  Unit/Bed#: [facility] [facility] 2 -01 Encounter: [id number]  Date Of Visit: 01/15/19      Assessment/plan  1  Acute hypoxic respiratory failure due to multifocal pneumonia- appreciate pulmonary recommendations  Pt improving on cefepime, flagyl and vanco  Day 6 of cefepime, day 3 of flagyl and day 6 of vanco  procalcitonin decreased  mrsa negative  Sputum showing mixed luh  Blood cultures are negative  Could possibly d/c vanco but due to the severity of the pna may need to complete the full 7 days  Will await pulmonary follow up  Pts oxygen has been weaned to 3 liters  She uses two at home as needed  Will check procalcitonin in am       2  Lactic acidosis on admission, secondary to 1- resolved       3  Hypertension- stable  Continue current treatment     4  Acute toxic encephalopathy secondary to 1- resolved     5  Copd with out exacerbation- continue current treatment per pulmonary  Pt when discharged will go home on breo, atrovent nebs bid, and albuterol inhaler       6  Multiple myeloma- pt scheduled for chemo this Wednesday  Likely will need to reschedule due to number 1  Follow up with oncology outpt  Will not order labs for chemo at this time       7  Thrombocytopenia/anemia- due to number 6  Platelets increased after transfusion  Will continue to monitor to see if further transfusions will be needed  8  Epistaxis due to number 7- please see number 7     9  Hypokalemia- improved  Potassium was increase to tid dosing yesterday  Will check k in am      Subjective:   Pt seen and examined  Pts main complaint is that she wants to go home  Did discuss with pt the severity of sending her home too soon  Did discuss that if she continues to improve possible d/c in 48 to 72 hours  Discussed the hypokalemia and antibiotics  No other problems per pt  No f/c no cp no worsening sob   No n/v/d no abd pain    Objective:     Vitals: Blood pressure 143/82, pulse 83, temperature 98 9 °F (37 2 °C), temperature source Tympanic, resp  rate 16, height 5' (1 524 m), weight 77 1 kg (169 lb 15 6 oz), SpO2 95 %  ,Body mass index is 33 2 kg/m²  Lab, Imaging and other studies:    Results from last 7 days  Lab Units 01/15/19  0453  01/10/19  2152   WBC Thousand/uL 4 00*  < >  --    HEMOGLOBIN g/dL 7 6*  < >  --    I STAT HEMOGLOBIN   --   < >  --    HEMATOCRIT % 24 7*  < >  --    HEMATOCRIT, ISTAT   --   < >  --    PLATELETS Thousands/uL 48*  < >  --    INR   --   --  1 25*   < > = values in this interval not displayed  Results from last 7 days  Lab Units 01/15/19  0453  01/10/19  2159 01/10/19  2151   POTASSIUM mmol/L 2 7*  < >  --  4 2   CHLORIDE mmol/L 101  < >  --  100   CO2 mmol/L 35*  < >  --  22   CO2, I-STAT mmol/L  --   --  24  --    BUN mg/dL 16  < >  --  47*   CREATININE mg/dL 0 73  < >  --  1 41*   CALCIUM mg/dL 9 1  < >  --  10 5*   ALK PHOS U/L  --   --   --  100   ALT U/L  --   --   --  21   AST U/L  --   --   --  20   GLUCOSE, ISTAT mg/dl  --   --  259*  --    < > = values in this interval not displayed  Results from last 7 days  Lab Units 01/10/19  2151   TROPONIN I ng/mL <0 02     Lab Results   Component Value Date    BLOODCX No Growth After 4 Days  01/10/2019    BLOODCX No Growth After 4 Days  01/10/2019    SPUTUMCULTUR 2+ Growth of  01/13/2019    SPUTUMCULTUR  01/13/2019     Commensal respiratory luh only; No significant growth of Staph aureus/MRSA or Pseudomonas aeruginosa  Xr Chest Portable    Result Date: 1/13/2019  Narrative: CHEST INDICATION:   r/o chf  COMPARISON:  Chest radiographs January 10, 2019 and CT pulmonary angiogram January 10, 2019  EXAM PERFORMED/VIEWS:  XR CHEST PORTABLE  AP semierect FINDINGS: The heart is enlarged  Atherosclerotic changes in the aorta  Bilateral pleural effusions, right side greater than left  These have increased in size    Franny and pulmonary vessels are prominent  Worsening alveolar infiltrates throughout the left lung with evolving more focal area of consolidation in the left lower  lobe  Stable alveolar infiltrate right lower lobe  Osseous structures appear within normal limits for patient age  Impression: Worsening pneumonia  Workstation performed: [id number]     X-ray Chest 1 View Portable    Result Date: 1/11/2019  Narrative: CHEST INDICATION:   SOB  COMPARISON:  Chest x-ray 9/1/2018  EXAM PERFORMED/VIEWS:  XR CHEST PORTABLE FINDINGS: Cardiomediastinal silhouette appears unremarkable  Emphysematous changes are noted consistent with chronic obstructive pulmonary disease  Superimposed left basilar opacity and patchy airspace opacity throughout the right lung is suspicious for pneumonia  No pneumothorax or pleural effusion  Osseous structures appear within normal limits for patient age  Impression: 1  Left basilar opacity and patchy airspace opacity throughout the right lung suspicious for pneumonia  2   Emphysema  Workstation performed: [id number]     Cta Ed Chest Pe Study    Result Date: 1/10/2019  Narrative: CTA - CHEST WITH IV CONTRAST - PULMONARY ANGIOGRAM INDICATION:   hypoxia, tachypnea, cancer  Severe respiratory distress, on BiPAP, tachycardia  History of COPD, multiple myeloma  COMPARISON: Plain film portable chest radiograph performed today and CT of the chest dated June 18, 2009  TECHNIQUE: CTA examination of the chest was performed using angiographic technique according to a protocol specifically tailored to evaluate for pulmonary embolism  Axial, sagittal, and coronal 2D reformatted images were created from the source data and  submitted for interpretation  In addition, coronal 3D MIP postprocessing was performed on the acquisition scanner  Radiation dose length product (DLP) for this visit:  312 mGy-cm     This examination, like all CT scans performed in the [facility], was performed utilizing techniques to minimize radiation dose exposure, including the use of iterative reconstruction and automated exposure control  IV Contrast:  85 mL of iodixanol (VISIPAQUE)  FINDINGS: PULMONARY ARTERIAL TREE:  No pulmonary embolus is seen  LUNGS:  Moderate emphysematous changes are present  There is consolidation containing air bronchograms as well as a component of volume loss within the right middle lobe of the lung and the right lower lobe of the lung  There are also patchy areas of airspace opacity in the right upper lobe and left upper lobe of the lung  There is a combination of atelectasis and consolidation containing air bronchograms posteriorly in the inferior left lingula  There is atelectasis posteriorly at the left lung base  PLEURA:  Unremarkable  HEART/GREAT VESSELS:  There is mild atherosclerosis of the thoracic aorta  MEDIASTINUM AND ROSY:  Unremarkable  CHEST WALL AND LOWER NECK:   Unremarkable  VISUALIZED STRUCTURES IN THE UPPER ABDOMEN:  Left adrenal fullness appears similar to the prior study  OSSEOUS STRUCTURES:  There are numerous tiny lytic areas as well as scattered areas of sclerosis, most pronounced in the spine and ribs, likely related to the patient's history of multiple myeloma, worse compared with 2009  There is an old healed right rib fracture deformity  There has been prior vertebroplasty  Deformity of the inferior sternum appears similar to the prior study  Impression: Multifocal pneumonia, as described above  Please see discussion  Follow-up after treatment to ensure complete radiographic resolution is recommended  Moderate emphysema  No evidence of pulmonary embolism is seen  There is rather extensive osseous abnormality, as described above, likely related to patient's history of multiple myeloma, worse compared with 2009    I personally discussed this study with CHLOE OLIVER on 1/10/2019 at 11:33 PM  Workstation performed: [id number]       Scheduled Meds: Current Facility-Administered Medications:  acetaminophen 650 mg Oral Q6H PRN Ruizjune Lind, PA-C    acyclovir 400 mg Oral Daily Ruizjune Lind, PA-C    albuterol 2 5 mg Nebulization Q6H PRN Ruizjune Lind, PA-C    amLODIPine 2 5 mg Oral Daily Ruizjune Lind, PA-C    aspirin 81 mg Oral Daily Ruiz Diogo, PA-C    cefepime 2,000 mg Intravenous Q12H RuizHIPOLITO Joesph-C Last Rate: 2,000 mg (01/15/19 1227)   chlorhexidine 15 mL Mouth/Throat BID Ruizjune Lind, PA-C    fluticasone 2 spray Nasal Daily Ruizjune Lind, PA-C    fluticasone-vilanterol 1 puff Inhalation Daily Scooter Silva PA-C    gabapentin 300 mg Oral Daily Ruizjune Lind, PA-LOLA    metoprolol succinate 100 mg Oral Daily Ruizjune Lind, PA-C    metroNIDAZOLE 500 mg Oral Q8H [address] Neo Bishop MD    morphine 15 mg Oral Q6H PRN Jose Abbott MD    ondansetron 4 mg Intravenous Q6H PRN HIPOLITO Frazier-LOLA    pantoprazole 40 mg Oral Early Morning HIPOLITO Frazier-C    potassium chloride 40 mEq Oral TID With Meals Edelmira Maurice DO    senna 43 mg Oral Daily Ruiz Lind PA-C    sodium chloride 50 mL/hr Intravenous Continuous Jose Abbott MD Last Rate: 50 mL/hr (01/14/19 2027)   vancomycin 1,000 mg Intravenous Q12H Ruiz Lind PA-C Last Rate: 1,000 mg (01/15/19 0836)     Continuous Infusions:   sodium chloride 50 mL/hr Last Rate: 50 mL/hr (01/14/19 2027)     PRN Meds:   acetaminophen    albuterol    morphine    ondansetron      Physical exam:  Physical Exam  General appearance: alert and oriented, in no acute distress  Head: Normocephalic, without obvious abnormality, atraumatic  Eyes: conjunctivae/corneas clear  PERRL, EOM's intact  Fundi benign    Neck: no adenopathy, no carotid bruit, no JVD, supple, symmetrical, trachea midline and thyroid not enlarged, symmetric, no tenderness/mass/nodules  Lungs: rhonchi at lower lobes bilateral  Heart: regular rate and rhythm, S1, S2 normal, no murmur, click, rub or gallop  Abdomen: soft, non-tender; bowel sounds normal; no masses,  no organomegaly  Extremities: extremities normal, warm and well-perfused; no cyanosis, clubbing, or edema  Pulses: 2+ and symmetric  Skin: Skin color, texture, turgor normal  No rashes or lesions  Neurologic: Mental status: Alert, oriented, thought content appropriate      VTE Pharmacologic Prophylaxis: Reason for no pharmacologic prophylaxis pancytopenia  VTE Mechanical Prophylaxis: sequential compression device    Counseling / Coordination of Care  Total floor / unit time spent today 20 minutes     Current Length of Stay: 4 day(s)    Current Patient Status: Inpatient       Code Status: Level 1 - Full Code

## 2019-01-15 NOTE — PROGRESS NOTES
Vancomycin IV Pharmacy-to-Dose Consultation    Manuel Brandon is a 58 y o  female who is currently receiving Vancomycin IV with management by the Pharmacy Consult service  Assessment/Plan:  The patient was reviewed  Renal function is stable and no signs or symptoms of nephrotoxicity and/or infusion reactions were documented in the chart  Based on todays assessment, continue current vancomycin (day # 6) dosing of 1000mg q 12h with a plan for trough to be drawn at 745am on 1-16-19  We will continue to follow the patients culture results and clinical progress daily      Dayana Martinez, Pharmacist

## 2019-01-15 NOTE — CONSULTS
Vancomycin D/C Verified By: Renzo Sheffield MD (Tue Calixto 15, 2019 4743) - pharmacy will close consult       Thanks

## 2019-01-16 ENCOUNTER — APPOINTMENT (INPATIENT)
Dept: RADIOLOGY | Facility: HOSPITAL | Age: 64
DRG: 871 | End: 2019-01-16
Payer: COMMERCIAL

## 2019-01-16 LAB
ANION GAP SERPL CALCULATED.3IONS-SCNC: 5 MMOL/L (ref 4–13)
BACTERIA BLD CULT: NORMAL
BACTERIA BLD CULT: NORMAL
BUN SERPL-MCNC: 17 MG/DL (ref 5–25)
CALCIUM SERPL-MCNC: 8.9 MG/DL (ref 8.3–10.1)
CHLORIDE SERPL-SCNC: 98 MMOL/L (ref 100–108)
CO2 SERPL-SCNC: 35 MMOL/L (ref 21–32)
CREAT SERPL-MCNC: 0.78 MG/DL (ref 0.6–1.3)
ERYTHROCYTE [DISTWIDTH] IN BLOOD BY AUTOMATED COUNT: 14.9 % (ref 11.6–15.1)
GFR SERPL CREATININE-BSD FRML MDRD: 81 ML/MIN/1.73SQ M
GLUCOSE SERPL-MCNC: 227 MG/DL (ref 65–140)
HCT VFR BLD AUTO: 26.6 % (ref 34.8–46.1)
HGB BLD-MCNC: 8 G/DL (ref 11.5–15.4)
MCH RBC QN AUTO: 32 PG (ref 26.8–34.3)
MCHC RBC AUTO-ENTMCNC: 30.1 G/DL (ref 31.4–37.4)
MCV RBC AUTO: 106 FL (ref 82–98)
PLATELET # BLD AUTO: 33 THOUSANDS/UL (ref 149–390)
PMV BLD AUTO: 10.3 FL (ref 8.9–12.7)
POTASSIUM SERPL-SCNC: 3.9 MMOL/L (ref 3.5–5.3)
PROCALCITONIN SERPL-MCNC: 3.22 NG/ML
RBC # BLD AUTO: 2.5 MILLION/UL (ref 3.81–5.12)
SODIUM SERPL-SCNC: 138 MMOL/L (ref 136–145)
WBC # BLD AUTO: 3.51 THOUSAND/UL (ref 4.31–10.16)

## 2019-01-16 PROCEDURE — 80048 BASIC METABOLIC PNL TOTAL CA: CPT | Performed by: INTERNAL MEDICINE

## 2019-01-16 PROCEDURE — 99232 SBSQ HOSP IP/OBS MODERATE 35: CPT | Performed by: INTERNAL MEDICINE

## 2019-01-16 PROCEDURE — 85027 COMPLETE CBC AUTOMATED: CPT | Performed by: INTERNAL MEDICINE

## 2019-01-16 PROCEDURE — 71046 X-RAY EXAM CHEST 2 VIEWS: CPT

## 2019-01-16 PROCEDURE — 94760 N-INVAS EAR/PLS OXIMETRY 1: CPT

## 2019-01-16 PROCEDURE — 94640 AIRWAY INHALATION TREATMENT: CPT

## 2019-01-16 PROCEDURE — 84145 PROCALCITONIN (PCT): CPT | Performed by: INTERNAL MEDICINE

## 2019-01-16 RX ADMIN — CEFEPIME HYDROCHLORIDE 2000 MG: 2 INJECTION, POWDER, FOR SOLUTION INTRAVENOUS at 23:52

## 2019-01-16 RX ADMIN — ALBUTEROL SULFATE 2.5 MG: 2.5 SOLUTION RESPIRATORY (INHALATION) at 20:34

## 2019-01-16 RX ADMIN — ALBUTEROL SULFATE 2.5 MG: 2.5 SOLUTION RESPIRATORY (INHALATION) at 05:28

## 2019-01-16 RX ADMIN — MORPHINE SULFATE 15 MG: 15 TABLET ORAL at 22:46

## 2019-01-16 RX ADMIN — PANTOPRAZOLE SODIUM 40 MG: 40 TABLET, DELAYED RELEASE ORAL at 06:03

## 2019-01-16 RX ADMIN — POTASSIUM CHLORIDE 40 MEQ: 1500 TABLET, EXTENDED RELEASE ORAL at 09:36

## 2019-01-16 RX ADMIN — GABAPENTIN 300 MG: 300 CAPSULE ORAL at 09:36

## 2019-01-16 RX ADMIN — FLUTICASONE PROPIONATE 2 SPRAY: 50 SPRAY, METERED NASAL at 09:35

## 2019-01-16 RX ADMIN — FLUTICASONE FUROATE AND VILANTEROL TRIFENATATE 1 PUFF: 100; 25 POWDER RESPIRATORY (INHALATION) at 09:36

## 2019-01-16 RX ADMIN — POTASSIUM CHLORIDE 40 MEQ: 1500 TABLET, EXTENDED RELEASE ORAL at 11:47

## 2019-01-16 RX ADMIN — CEFEPIME HYDROCHLORIDE 2000 MG: 2 INJECTION, POWDER, FOR SOLUTION INTRAVENOUS at 11:47

## 2019-01-16 RX ADMIN — ACYCLOVIR 400 MG: 200 CAPSULE ORAL at 09:36

## 2019-01-16 RX ADMIN — MORPHINE SULFATE 15 MG: 15 TABLET ORAL at 05:11

## 2019-01-16 NOTE — UTILIZATION REVIEW
Notification of Inpatient Admission/Inpatient Authorization Request  This is a Notification of Inpatient Admission/Request for Inpatient Authorization for our facility [address] [place] [address]  Be advised that this patient was admitted to our facility under Inpatient Status  Please contact the Utilization Review Department where the patient is receiving care services for additional admission information  Place of Service Code: 24   Place of Service Name: Inpatient Hospital  Presentation Date & Time: 1/10/2019  9:44 PM  Inpatient Admission Date & Time: 1/11/19 0005  Discharge Date & Time: No discharge date for patient encounter  Discharge Disposition (if discharged): Final discharge disposition not confirmed  Attending Physician: OLIVER Hernandez  [employer] Practioner ID- [id number]  [email or web address]   [facility], [facility]   Phone 1: [phone]  Fax: [phone]  Admission Orders     Ordered        01/11/19 0005  Inpatient Admission (expected length of stay for this patient is greater than two midnights)  Once             Facility: [address] [place] [address]  Address: [address] [place], [place] [address]Desiraeavis Patiño, [facility], [facility]  Phone: [phone] Tax ID: [id number]  NPI: [id number]  Medicare ID: [id number]    [facility] Utilization Review Department  Phone: [phone]; Fax [phone]  ATTENTION: Please call with any questions or concerns to [phone]  and carefully listen to the prompts so that you are directed to the right person  Send all requests for admission clinical reviews, approved or denied determinations and any other requests to fax [phone]   All voicemails are confidential     Aman Samuels RN Registered Nurse Signed   Utilization Review Date of Service: 1/11/2019  1:48 PM            Initial Clinical Review     Admission: Date/Time/Statement: 1/11/19 @ 0005         Orders Placed This Encounter   Procedures    Inpatient Admission (expected length of stay for this patient is greater than two midnights)       Standing Status:   Standing       Number of Occurrences:   1       Order Specific Question:   Admitting Physician       Answer:   Real Marrow [[id number]]       Order Specific Question:   Level of Care       Answer:   Med Surg [16]       Order Specific Question:   Estimated length of stay       Answer:   More than 2 Midnights       Order Specific Question:   Certification       Answer:   I certify that inpatient services are medically necessary for this patient for a duration of greater than two midnights  See H&P and MD Progress Notes for additional information about the patient's course of treatment       ED: Date/Time/Mode of Arrival:             ED Arrival Information      Expected Arrival Acuity Means of Arrival Escorted By Service Admission Type     - 1/10/2019 21:44 Emergent Ambulance [facility] Hospitalist Emergency     Arrival Complaint     SOB          Chief Complaint:        Chief Complaint   Patient presents with    Shortness of Breath       Pt c/o SOB and then collapsed at home  EMS stated O2 was 62% on arrival  Pt presented with tachypnea on CPAP      History of Illness:  Per H&P: 58 y  o  female who presents with shortness of breath for 2 days, getting progressively worse  States her  was trying to get her to come to the hospital but she didn't want to  She went outside and collapsed   called 46  EMS found patient with sats in the low 60%  She arrived on CPAP  She denies fever  States her sister in law recently had pneumonia as well   Denies chest pain          ED Vital Signs:            ED Triage Vitals   Temperature Pulse Respirations Blood Pressure SpO2   01/10/19 2346 01/10/19 2147 01/10/19 2147 01/10/19 2147 01/10/19 2147   98 6 °F (37 °C) (!) 150 (!) 38 144/81 (!) 85 %       Temp Source Heart Rate Source Patient Position - Orthostatic VS BP Location FiO2 (%)   01/10/19 2349 01/10/19 2147 01/10/19 2147 01/10/19 2147 --   Oral Monitor Sitting Right arm         Pain Score           01/10/19 2228           9                Wt Readings from Last 1 Encounters:   01/10/19 77 1 kg (169 lb 15 6 oz)      Vital Signs (abnormal):   01/10/19 2349 98 6 °F (37 °C)  110  32 120/56 96 % -- HS   01/10/19 2346 98 6 °F (37 °C) -- -- -- -- -- DS   01/10/19 2228 --  130  28 128/60 99 % -- NS   01/10/19 2200 -- -- -- -- 91 % -- PW   01/10/19 2157 -- -- -- -- -- 77 1 kg (169 lb 15 6 oz) NS   01/10/19 2147 --  150  38 144/81  85 %                Pertinent Labs/Diagnostic Test Results:   Lactic acid 5 2,   Repeat   WBC's 4 82,   H&H 10 7 / 35 5  BUN 47,  Cr 1 41,   A gap 18,   Glu 249,   Ca 10 5,   Alb 2 3  Venous gas:  7 355 / 38 9 / 57 / 84 6%,  Bicarb 21 8  Urine:  2+ protein,  Mod blood  BC's and Urine cx's pending     CXR: 1   Left basilar opacity and patchy airspace opacity throughout the right lung suspicious for pneumonia  2   Emphysema      CTA Chest: Multifocal pneumonia, as described above     Moderate emphysema  No evidence of pulmonary embolism is seen    There is rather extensive osseous abnormality, as described above, likely related to patient's history of multiple myeloma, worse compared with 2009         ED Treatment:              Medication Administration from 01/10/2019 2144 to 01/11/2019 0059        Date/Time Order Dose Route Action Action by Comments       01/10/2019 2235 sodium chloride 0 9 % bolus 500 mL 0 mL Intravenous Stopped Ann Aguilar RN         01/10/2019 2201 sodium chloride 0 9 % bolus 500 mL 500 mL Intravenous New Bag Ann Aguilar RN         01/10/2019 2201 albuterol inhalation solution 5 mg 5 mg Nebulization Given Ann Aguilar RN         01/10/2019 2201 ipratropium (ATROVENT) 0 02 % inhalation solution 0 5 mg 0 5 mg Nebulization Given Ann Aguilar RN         01/10/2019 2218 iodixanol (VISIPAQUE) 320 MG/ML injection 85 mL 85 mL Intravenous Given Clement Lowry         01/11/2019 0018 sodium chloride 0 9 % bolus 1,000 mL 0 mL Intravenous Stopped Carlito Bentley RN         01/10/2019 2231 sodium chloride 0 9 % bolus 1,000 mL 1,000 mL Intravenous New Bag Sherwin Mckeon RN         01/10/2019 2232 sodium chloride 0 9 % bolus 1,000 mL 1,000 mL Intravenous New Bag Sherwin Mckeon RN         01/10/2019 2300 cefepime (MAXIPIME) 2 g/50 mL dextrose IVPB 0 mg Intravenous Stopped Carlito Bentley RN         01/10/2019 2233 cefepime (MAXIPIME) 2 g/50 mL dextrose IVPB 2,000 mg Intravenous New Bag Sherwin Mckeon RN         01/10/2019 2308 vancomycin (VANCOCIN) 1,250 mg in sodium chloride 0 9 % 250 mL IVPB 1,250 mg Intravenous New Carlito Bentley RN            Past Medical/Surgical History:       Past Medical History:   Diagnosis Date    Anemia      Asthma      Chronic constipation      Chronic pain      COPD (chronic obstructive pulmonary disease) (HCC)      CTS (carpal tunnel syndrome)      DJD (degenerative joint disease)      GERD (gastroesophageal reflux disease)      Hypertension      Multiple myeloma (HCC)      SAMIR (obstructive sleep apnea)      Osteonecrosis (HCC)      Sciatica      Thoracic aortic aneurysm (HCC)        Admitting Diagnosis: Lactic acidosis [E87 2]  Respiratory distress [R06 03]  SOB (shortness of breath) [R06 02]  Bandemia [D72 825]  Multifocal pneumonia [J18 9]  Age/Sex: 58 y o  female      Assessment/Plan:   78 yo female admitted with Multifocal Pneumonia - Continue cefepime/vanco  Blood cultures pending  Initially requiring CPAP but now maintaining sats on nasal cannula  · Lactic acidosis- lactic acid initially 5 2, now 1 5 after fluids  Resolved  · Multiple myeloma- sees heme/onc at [facility]  Chemo every 3 weeks, next scheduled on Wednesday  · HTN- continue home meds  · GERD- continue PPI  · Thrombocytopenia- platelets 29  Recently at [facility] range from 37-43  Avoid anticoagulation  · Asthma- continue inhalers/nebs   Former smoker        Admission Orders: INPT  TELE  O2 @ 6 liters - weaned to 4 liters  Droplet Isolation  CBC,  BMP in am  SCD's  Consult Wound Care     Scheduled Meds:   Current Facility-Administered Medications:  acetaminophen 650 mg Oral Q6H PRN       acyclovir 400 mg Oral Daily       albuterol 2 5 mg Nebulization Q6H PRN       amLODIPine 2 5 mg Oral Daily       aspirin 81 mg Oral Daily       cefepime 2,000 mg Intravenous Q12H       chlorhexidine 15 mL Mouth/Throat BID       fluticasone 2 spray Nasal Daily       gabapentin 300 mg Oral Daily       LORazepam 0 5 mg Oral Q6H PRN       metoprolol succinate 100 mg Oral Daily       mirtazapine 15 mg Oral HS PRN       morphine 30 mg Oral BID       morphine 15 mg Oral Q4H PRN       ondansetron 4 mg Intravenous Q6H PRN       pantoprazole 40 mg Oral Early Morning       potassium chloride 20 mEq Oral BID       senna 43 mg Oral Daily                     vancomycin 1,250 mg Intravenous Q24H          Continuous Infusions:   sodium chloride 100 mL/hr Last Rate: 100 mL/hr (01/11/19 1215)            [address] Utilization Review Department  Phone: [phone]; Fax [phone]  [email or web address]  ATTENTION: Please call with any questions or concerns to [phone]  and carefully listen to the prompts so that you are directed to the right person  Send all requests for admission clinical reviews, approved or denied determinations and any other requests to fax [phone]  All voicemails are confidential            Ardine Legato, RN Registered Nurse Signed   Utilization Review Date of Service: 1/14/2019  2:13 PM           Continued Stay Review     Date: 01/14/19  Vital Signs: /89 (BP Location: Right arm)   Pulse 79   Temp (!) 96 9 °F (36 1 °C) (Temporal)   Resp 19   Ht 5' (1 524 m)   Wt 77 1 kg (169 lb 15 6 oz)   SpO2 97%   BMI 33 20 kg/m²   Assessment/plan  1   Acute hypoxic respiratory failure due to multifocal pneumonia- appreciate pulmonary recommendations  Pt improving on cefepime, flagyl and vanco  Day 5 of cefepime, day 2 of flagyl and day 3 of vanco  Awaiting repeat procalcitonin level, mrsa culture, and sputum culture to help decide about continuation of vanco  Blood cultures are negative so far  Continue to wean oxygen as tolerated       2  Lactic acidosis on admission, secondary to 1- resolved       3  Hypertension- stable  Continue current treatment     4  Acute toxic encephalopathy secondary to 1- resolved     5  Copd with out exacerbation- continue current treatment per pulmonary     6  Multiple myeloma- pt scheduled for chemo this Wednesday  Likely will need to reschedule due to number 1  Follow up with oncology outpt  Will not order labs for chemo at this time       7  Thrombocytopenia/anemia- due to number 6  Will transfuse one unit of platelets due to decreasing in platelet count and epistaxis  Blood consent on chart  Will give tylenol and benadryl prior     8  Epistaxis due to number 7- please see number 7     9  Hypokalemia- will increase potassium to 40meq tid   Will check mg  Medications:   Scheduled Meds:   Current Facility-Administered Medications:  acetaminophen 650 mg Oral Q6H PRN   acetaminophen 650 mg Oral Once   acyclovir 400 mg Oral Daily   albuterol 2 5 mg Nebulization Q6H PRN   amLODIPine 2 5 mg Oral Daily   aspirin 81 mg Oral Daily   cefepime 2,000 mg Intravenous Q12H   chlorhexidine 15 mL Mouth/Throat BID   diphenhydrAMINE 25 mg Oral Once   fluticasone 2 spray Nasal Daily   fluticasone-vilanterol 1 puff Inhalation Daily   gabapentin 300 mg Oral Daily   metoprolol succinate 100 mg Oral Daily   metroNIDAZOLE 500 mg Intravenous Q8H   morphine 15 mg Oral Q6H PRN   ondansetron 4 mg Intravenous Q6H PRN   pantoprazole 40 mg Oral Early Morning   potassium chloride 40 mEq Oral TID With Meals   senna 43 mg Oral Daily   sodium chloride 50 mL/hr Intravenous Continuous   vancomycin 1,000 mg Intravenous Q12H    Continuous Infusions:   sodium chloride 50 mL/hr Last Rate: 50 mL/hr (01/14/19 0035)      PRN Meds:   acetaminophen    albuterol    morphine    ondansetron  Pertinent Labs/Diagnostic Results: hgb 7 5, hct 24 0, plts 11, k 2 5  Age/Sex: 58 y o  female   Discharge Plan: tbd  [facility]  Utilization Review Department  Phone: [phone]; Fax [phone]  [email or web address]  ATTENTION: Please call with any questions or concerns to [phone]  and carefully listen to the prompts so that you are directed to the right person  Send all requests for admission clinical reviews, approved or denied determinations and any other requests to fax [phone]   All voicemails are confidential

## 2019-01-16 NOTE — SOCIAL WORK
CM was notified by [email or web address] that if pt discharges on anything higher then 2L of home O2, they will require a new script to increase her usage for insurance purposes  Left sticky note for attending

## 2019-01-16 NOTE — SOCIAL WORK
CM sent a third email to hospital financial counselors in regards to pt not having any insurance listed   [facility] still reports "self-pay" but not "PA MA pending "

## 2019-01-16 NOTE — PROGRESS NOTES
[facility] Progress Note  Patient: Earl Garrett 58 y o  female   MRN: [id number]  PCP: No primary care provider on file  Unit/Bed#: [facility] 2 -01 Encounter: [id number]  Date Of Visit: 01/16/19      Assessment/plan  1  Acute hypoxic respiratory failure due to multifocal pneumonia- appreciate pulmonary recommendations  Pt improving on cefepime, flagyl and vanco  Day 7 of cefepime  Vanco/flagyl d/yasemin yesterday  procalcitonin decreased  mrsa negative  Sputum showing mixed luh  Blood cultures are negative  Will check procalcitonin in am  Will repeat cxr  Pt will need ambulatory desat study prior to discharge home       2  Lactic acidosis on admission, secondary to 1- resolved       3  Hypertension- stable  Continue current treatment     4  Acute toxic encephalopathy secondary to 1- resolved     5  Copd with out exacerbation- continue current treatment per pulmonary  Pt when discharged will go home on breo, atrovent nebs bid, and albuterol inhaler       6  Multiple myeloma- pt scheduled for chemo this Wednesday  Likely will need to reschedule due to number 1  Follow up with oncology outpt  Will not order labs for chemo at this time       7  Thrombocytopenia/anemia- due to number 6  Platelets increased after transfusion  Will continue to monitor to see if further transfusions will be needed  Will check cbc now       8  Epistaxis due to number 7- resolved       9  Hypokalemia- improved  Potassium was increase to tid dosing yesterday  Check bmp now  dispo- will order physical therapy  Pt will need home oxygen eval prior to discharge  Possible discharge in 24 to 48 hours if she continues to improve and pulmonary agrees  Subjective:   Pt seen and examined  Pt improved today  She is breathing better  Her cough is decreased  She is getting tired of the potassium replacement  She is eager to go home  She has been out of bed to bathroom and around her room but she has not walked long distances  Objective:     Vitals: Blood pressure 104/63, pulse (!) 112, temperature 98 1 °F (36 7 °C), temperature source Temporal, resp  rate 20, height 5' (1 524 m), weight 77 1 kg (169 lb 15 6 oz), SpO2 96 %  ,Body mass index is 33 2 kg/m²  Lab, Imaging and other studies:    Results from last 7 days  Lab Units 01/15/19  0453  01/10/19  2152   WBC Thousand/uL 4 00*  < >  --    HEMOGLOBIN g/dL 7 6*  < >  --    I STAT HEMOGLOBIN   --   < >  --    HEMATOCRIT % 24 7*  < >  --    HEMATOCRIT, ISTAT   --   < >  --    PLATELETS Thousands/uL 48*  < >  --    INR   --   --  1 25*   < > = values in this interval not displayed  Results from last 7 days  Lab Units 01/15/19  0453  01/10/19  2159 01/10/19  2151   POTASSIUM mmol/L 2 7*  < >  --  4 2   CHLORIDE mmol/L 101  < >  --  100   CO2 mmol/L 35*  < >  --  22   CO2, I-STAT mmol/L  --   --  24  --    BUN mg/dL 16  < >  --  47*   CREATININE mg/dL 0 73  < >  --  1 41*   CALCIUM mg/dL 9 1  < >  --  10 5*   ALK PHOS U/L  --   --   --  100   ALT U/L  --   --   --  21   AST U/L  --   --   --  20   GLUCOSE, ISTAT mg/dl  --   --  259*  --    < > = values in this interval not displayed  Results from last 7 days  Lab Units 01/10/19  2151   TROPONIN I ng/mL <0 02     Lab Results   Component Value Date    BLOODCX No Growth After 5 Days  01/10/2019    BLOODCX No Growth After 5 Days  01/10/2019    SPUTUMCULTUR 2+ Growth of  01/13/2019    SPUTUMCULTUR  01/13/2019     Commensal respiratory luh only; No significant growth of Staph aureus/MRSA or Pseudomonas aeruginosa       Scheduled Meds:   Current Facility-Administered Medications:  acetaminophen 650 mg Oral Q6H PRN Lisa Camara PA-C    acyclovir 400 mg Oral Daily Lisa Camara PA-C    albuterol 2 5 mg Nebulization Q6H PRN Lisa Camara PA-C    amLODIPine 2 5 mg Oral Daily Lisa Camara PA-C    cefepime 2,000 mg Intravenous Q12H Lisa Camara PA-C Last Rate: 2,000 mg (01/16/19 1147)   chlorhexidine 15 mL Mouth/Throat BID Kale Newton PA-C    fluticasone 2 spray Nasal Daily Kale Newton PA-C    fluticasone-vilanterol 1 puff Inhalation Daily Scooter Silva PA-C    gabapentin 300 mg Oral Daily Kale Newton PA-C    metoprolol succinate 100 mg Oral Daily Kale Newton PA-C    morphine 15 mg Oral Q6H PRN Estela Lantigua MD    ondansetron 4 mg Intravenous Q6H PRN Kale Newton PA-C    pantoprazole 40 mg Oral Early Morning Kael Newton PA-C    potassium chloride 40 mEq Oral TID With Meals Edelmira Maurice     senna 43 mg Oral Daily Kale Newton PA-C      Continuous Infusions:    PRN Meds:   acetaminophen    albuterol    morphine    ondansetron      Physical exam:  Physical Exam  General appearance: alert and oriented, in no acute distress  Head: Normocephalic, without obvious abnormality, atraumatic  Eyes: conjunctivae/corneas clear  PERRL, EOM's intact  Fundi benign    Neck: no adenopathy, no carotid bruit, no JVD, supple, symmetrical, trachea midline and thyroid not enlarged, symmetric, no tenderness/mass/nodules  Lungs: rhonchi at lower lobes bilateral  Heart: regular rate and rhythm, S1, S2 normal, no murmur, click, rub or gallop  Abdomen: soft, non-tender; bowel sounds normal; no masses,  no organomegaly  Extremities: extremities normal, warm and well-perfused; no cyanosis, clubbing, or edema  Pulses: 2+ and symmetric  Skin: Skin color, texture, turgor normal  No rashes or lesions  Neurologic: Mental status: Alert, oriented, thought content appropriate      VTE Pharmacologic Prophylaxis: Reason for no pharmacologic prophylaxis thrombocytopenia  VTE Mechanical Prophylaxis: sequential compression device    Counseling / Coordination of Care  Total floor / unit time spent today 20 minutes  Current Length of Stay: 5 day(s)    Current Patient Status: Inpatient       Code Status: Level 1 - Full Code

## 2019-01-16 NOTE — PROGRESS NOTES
Progress Note - Pulmonary   Francy Isbell 58 y o  female MRN: [id number]  Unit/Bed#: [facility] 2 -01 Encounter: [id number]    Assessment/Plan:    1  Acute on chronic hypoxic respiratory failure secondary to pneumonia  1  Currently requiring an increase in her oxygen therapy while she is asleep  Will need ambulatory pulseox before discharge to assess new oxygen requirements at home  2  Continue to titrate oxygen to maintain oxygen saturations greater than or equal to 88%  3  Wears 2L NC as needed at home  4  Cefepime day #7  5  Pulmonary toilet: incentive spirometry, OOB as tolerated, increase activity as tolerated   2  Multifocal pneumonia in a immunocompromised patient  1  Complete cefepime today   2  MRSA negative  3  Procalcitonin continues to trend down  Now 3 22  Repeat pending  4  Repeat CXR shows stable appearance of multi lobar pneumonia and improving bilateral effusions   3  Multiple myeloma with immunocompromised state  1  Treatment per primary team  4  COPD without acute exacerbation  1  Continue Breo 1 puff daily  2  Albuterol nebs q6 PRN  3  Pulmonary toilet  4  Discharge meds: Breo, Atrovent nebs BID, and albuterol rescue inhaler PRN  5  Epistaxis due to thrombocytopenia - resolved  1  Reports none today  2  Continue to monitor platelets   6  Chronic anemia of chronic disease secondary to multiple myeloma  1  Treatment per primary team    Patient is stable from a pulmonary standpoint  Patient instructed to follow up with [facility] pulmonary  Call with any questions  Subjective:    Patient is seen and examined today  She is seen sitting in bed and states she is feeling well this morning  She relates to me that she had an acute episode of chest/back pain that took her breath away last night  She received a breathing treatment and the pain went away in about 30 minutes  She feels mildly short of breath now and is continues to cough and bring up clear sputum   Denies chest pain at the moment, wheeze, fevers, chills, nausea, vomiting, dysphagia, GERD, abdominal pain, or leg swelling  Objective:    Vitals: Blood pressure 106/67, pulse (!) 107, temperature 98 °F (36 7 °C), temperature source Temporal, resp  rate 20, height 5' (1 524 m), weight 77 1 kg (169 lb 15 6 oz), SpO2 99 %  4L NC,Body mass index is 33 2 kg/m²  Intake/Output Summary (Last 24 hours) at 01/16/19 1545  Last data filed at 01/16/19 0024   Gross per 24 hour   Intake               50 ml   Output              200 ml   Net             -150 ml       Invasive Devices     Peripheral Intravenous Line            Peripheral IV 01/13/19 Left Forearm 2 days                Physical Exam:     Physical Exam   Constitutional: She is oriented to person, place, and time  She appears well-developed and well-nourished  No distress  HENT:   Head: Normocephalic and atraumatic  Mouth/Throat: Oropharynx is clear and moist    Eyes: Pupils are equal, round, and reactive to light  No scleral icterus  Neck: Neck supple  No JVD present  No tracheal deviation present  Cardiovascular: Normal rate, regular rhythm and normal heart sounds  Exam reveals no friction rub  No murmur heard  Pulmonary/Chest: Effort normal  No respiratory distress  She has no wheezes  She has rales (bibasilar )  She exhibits no tenderness  Abdominal: Soft  Bowel sounds are normal  There is no tenderness  Musculoskeletal: Normal range of motion  She exhibits no edema, tenderness or deformity  Neurological: She is alert and oriented to person, place, and time  No cranial nerve deficit  Skin: Skin is warm and dry  She is not diaphoretic  Psychiatric: She has a normal mood and affect  Her behavior is normal        Labs: I have personally reviewed pertinent lab results  , ABG: No results found for: PHART, IFY4KDH, PO2ART, EUL8ERM, W3EZAFYP, BEART, SOURCE, BNP: No results found for: BNP, CBC:   Lab Results   Component Value Date    WBC 3 51 (L) 01/16/2019    HGB 8 0 (L) 01/16/2019    HCT 26 6 (L) 01/16/2019     (H) 01/16/2019    PLT 33 (LL) 01/16/2019    MCH 32 0 01/16/2019    MCHC 30 1 (L) 01/16/2019    RDW 14 9 01/16/2019    MPV 10 3 01/16/2019   , CMP:   Lab Results   Component Value Date    SODIUM 138 01/16/2019    K 3 9 01/16/2019    CL 98 (L) 01/16/2019    CO2 35 (H) 01/16/2019    BUN 17 01/16/2019    CREATININE 0 78 01/16/2019    CALCIUM 8 9 01/16/2019    EGFR 81 01/16/2019   , PT/INR: No results found for: PT, INR, Troponin: No results found for: TROPONINI    Imaging and other studies: I have personally reviewed pertinent films in PACS     CXR 1/16/19:  Stable infiltrates in lateral right upper lobe   Improving pleural effusions

## 2019-01-17 VITALS
TEMPERATURE: 97.6 F | HEIGHT: 60 IN | WEIGHT: 169.97 LBS | BODY MASS INDEX: 33.37 KG/M2 | SYSTOLIC BLOOD PRESSURE: 117 MMHG | HEART RATE: 127 BPM | OXYGEN SATURATION: 93 % | RESPIRATION RATE: 18 BRPM | DIASTOLIC BLOOD PRESSURE: 70 MMHG

## 2019-01-17 PROBLEM — E87.2 LACTIC ACIDOSIS: Status: RESOLVED | Noted: 2019-01-11 | Resolved: 2019-01-17

## 2019-01-17 LAB
ANION GAP SERPL CALCULATED.3IONS-SCNC: 3 MMOL/L (ref 4–13)
BUN SERPL-MCNC: 18 MG/DL (ref 5–25)
CALCIUM SERPL-MCNC: 9.4 MG/DL (ref 8.3–10.1)
CHLORIDE SERPL-SCNC: 102 MMOL/L (ref 100–108)
CO2 SERPL-SCNC: 34 MMOL/L (ref 21–32)
CREAT SERPL-MCNC: 0.68 MG/DL (ref 0.6–1.3)
ERYTHROCYTE [DISTWIDTH] IN BLOOD BY AUTOMATED COUNT: 14.7 % (ref 11.6–15.1)
GFR SERPL CREATININE-BSD FRML MDRD: 93 ML/MIN/1.73SQ M
GLUCOSE SERPL-MCNC: 131 MG/DL (ref 65–140)
HCT VFR BLD AUTO: 25.3 % (ref 34.8–46.1)
HGB BLD-MCNC: 7.5 G/DL (ref 11.5–15.4)
MCH RBC QN AUTO: 31.5 PG (ref 26.8–34.3)
MCHC RBC AUTO-ENTMCNC: 29.6 G/DL (ref 31.4–37.4)
MCV RBC AUTO: 106 FL (ref 82–98)
PLATELET # BLD AUTO: 33 THOUSANDS/UL (ref 149–390)
PMV BLD AUTO: 11 FL (ref 8.9–12.7)
POTASSIUM SERPL-SCNC: 4.5 MMOL/L (ref 3.5–5.3)
PROCALCITONIN SERPL-MCNC: 1.97 NG/ML
RBC # BLD AUTO: 2.38 MILLION/UL (ref 3.81–5.12)
SODIUM SERPL-SCNC: 139 MMOL/L (ref 136–145)
WBC # BLD AUTO: 2.69 THOUSAND/UL (ref 4.31–10.16)

## 2019-01-17 PROCEDURE — 99239 HOSP IP/OBS DSCHRG MGMT >30: CPT | Performed by: INTERNAL MEDICINE

## 2019-01-17 PROCEDURE — G8978 MOBILITY CURRENT STATUS: HCPCS

## 2019-01-17 PROCEDURE — G8980 MOBILITY D/C STATUS: HCPCS

## 2019-01-17 PROCEDURE — G8979 MOBILITY GOAL STATUS: HCPCS

## 2019-01-17 PROCEDURE — 94761 N-INVAS EAR/PLS OXIMETRY MLT: CPT

## 2019-01-17 PROCEDURE — 85027 COMPLETE CBC AUTOMATED: CPT | Performed by: INTERNAL MEDICINE

## 2019-01-17 PROCEDURE — 99232 SBSQ HOSP IP/OBS MODERATE 35: CPT | Performed by: INTERNAL MEDICINE

## 2019-01-17 PROCEDURE — 80048 BASIC METABOLIC PNL TOTAL CA: CPT | Performed by: INTERNAL MEDICINE

## 2019-01-17 PROCEDURE — 84145 PROCALCITONIN (PCT): CPT | Performed by: INTERNAL MEDICINE

## 2019-01-17 PROCEDURE — 97163 PT EVAL HIGH COMPLEX 45 MIN: CPT

## 2019-01-17 RX ORDER — METOPROLOL SUCCINATE 50 MG/1
50 TABLET, EXTENDED RELEASE ORAL DAILY
Qty: 30 TABLET | Refills: 1 | Status: ON HOLD | OUTPATIENT
Start: 2019-01-18 | End: 2022-05-13 | Stop reason: SDUPTHER

## 2019-01-17 RX ORDER — METOPROLOL SUCCINATE 50 MG/1
50 TABLET, EXTENDED RELEASE ORAL DAILY
Status: DISCONTINUED | OUTPATIENT
Start: 2019-01-18 | End: 2019-01-17 | Stop reason: HOSPADM

## 2019-01-17 RX ORDER — ALBUTEROL SULFATE 90 UG/1
2 AEROSOL, METERED RESPIRATORY (INHALATION) EVERY 6 HOURS PRN
Qty: 8.5 G | Refills: 2 | Status: SHIPPED | OUTPATIENT
Start: 2019-01-17

## 2019-01-17 RX ORDER — ALBUTEROL SULFATE 2.5 MG/3ML
2.5 SOLUTION RESPIRATORY (INHALATION) EVERY 6 HOURS PRN
Qty: 360 ML | Refills: 1 | Status: SHIPPED | OUTPATIENT
Start: 2019-01-17 | End: 2019-02-16

## 2019-01-17 RX ADMIN — FLUTICASONE FUROATE AND VILANTEROL TRIFENATATE 1 PUFF: 100; 25 POWDER RESPIRATORY (INHALATION) at 08:34

## 2019-01-17 RX ADMIN — ACETAMINOPHEN 650 MG: 325 TABLET, FILM COATED ORAL at 15:36

## 2019-01-17 RX ADMIN — FLUTICASONE PROPIONATE 2 SPRAY: 50 SPRAY, METERED NASAL at 08:34

## 2019-01-17 RX ADMIN — ACYCLOVIR 400 MG: 200 CAPSULE ORAL at 08:34

## 2019-01-17 RX ADMIN — ACETAMINOPHEN 650 MG: 325 TABLET, FILM COATED ORAL at 08:33

## 2019-01-17 RX ADMIN — AMLODIPINE BESYLATE 2.5 MG: 2.5 TABLET ORAL at 08:31

## 2019-01-17 RX ADMIN — PANTOPRAZOLE SODIUM 40 MG: 40 TABLET, DELAYED RELEASE ORAL at 06:00

## 2019-01-17 RX ADMIN — GABAPENTIN 300 MG: 300 CAPSULE ORAL at 08:30

## 2019-01-17 RX ADMIN — CHLORHEXIDINE GLUCONATE 0.12% ORAL RINSE 15 ML: 1.2 LIQUID ORAL at 08:30

## 2019-01-17 NOTE — NURSING NOTE
IV removed  D/C instructions reviewed with patient  Patient left unit via wheelchair with spouse and PCA

## 2019-01-17 NOTE — PROGRESS NOTES
[facility] Progress Note  Patient: Harsh Avilez 58 y o  female   MRN: [id number]  PCP: No primary care provider on file  Unit/Bed#: [facility] 2 -01 Encounter: [id number]  Date Of Visit: 01/17/19      Assessment/plan  1  Acute hypoxic respiratory failure due to multifocal pneumonia- appreciate pulmonary recommendations  Pt improving on cefepime, flagyl and vanco  Day 8 of cefepime  Vanco/flagyl d/yasemin yesterday  procalcitonin decreased to 3  If continues to decrease will discontinue further antibiotics  mrsa negative  Sputum showing mixed luh  Blood cultures are negative  Will check procalcitonin in am  Repeat cxr reviewed  She will need a repeat cxr in 6 weeks  Pt will need ambulatory desat study prior to discharge home       2  Lactic acidosis on admission, secondary to 1- resolved       3  Hypertension- stable  Continue current treatment     4  Acute toxic encephalopathy secondary to 1- resolved     5  Copd with out exacerbation- continue current treatment per pulmonary  Pt when discharged will go home on breo, atrovent nebs bid, and albuterol inhaler       6  Multiple myeloma- pt scheduled for chemo this Wednesday  Likely will need to reschedule due to number 1  Follow up with oncology outpt  Will not order labs for chemo at this time       7  Thrombocytopenia/anemia- due to number 6  Platelets increased after transfusion  Will continue to monitor to see if further transfusions will be needed  Will check cbc now       8  Epistaxis due to number 7- resolved       9  Hypokalemia- resolved  10  History of htn with hypotension- pt reports her bp has been lower at home  Her norvasc used to be 5mg but was decreased to 2 5  Will d/c norvasc  Will decrease metoprolol to 50mg daily  Will monitor       dispo- awaiting physical therapy eval  Awaiting home oxygen eval  Awaiting repeat procalcitonin and repeat blood pressure   If all improved and hhpt or home with family support is recommended will d/c pt home  Pt is aware of plan  Subjective:   Pt seen and examined  Pt states she feels better  She wishes to go home today  Her bp was lower this am and bp medications where held  Pt denies dizziness/lightheadedness  She reports her bp has been running lower at home lately  Her norvasc was recently decreased from 5mg to 2 5mg  She is breathing better  She denies f/c no cp no n/v/d no abd pain  Objective:     Vitals: Blood pressure 91/59, pulse (!) 114, temperature (!) 97 4 °F (36 3 °C), temperature source Tympanic, resp  rate 18, height 5' (1 524 m), weight 77 1 kg (169 lb 15 6 oz), SpO2 94 %  ,Body mass index is 33 2 kg/m²  Lab, Imaging and other studies:    Results from last 7 days  Lab Units 01/17/19  0502  01/10/19  2152   WBC Thousand/uL 2 69*  < >  --    HEMOGLOBIN g/dL 7 5*  < >  --    I STAT HEMOGLOBIN   --   < >  --    HEMATOCRIT % 25 3*  < >  --    HEMATOCRIT, ISTAT   --   < >  --    PLATELETS Thousands/uL 33*  < >  --    INR   --   --  1 25*   < > = values in this interval not displayed  Results from last 7 days  Lab Units 01/17/19  0502  01/10/19  2159 01/10/19  2151   POTASSIUM mmol/L 4 5  < >  --  4 2   CHLORIDE mmol/L 102  < >  --  100   CO2 mmol/L 34*  < >  --  22   CO2, I-STAT mmol/L  --   --  24  --    BUN mg/dL 18  < >  --  47*   CREATININE mg/dL 0 68  < >  --  1 41*   CALCIUM mg/dL 9 4  < >  --  10 5*   ALK PHOS U/L  --   --   --  100   ALT U/L  --   --   --  21   AST U/L  --   --   --  20   GLUCOSE, ISTAT mg/dl  --   --  259*  --    < > = values in this interval not displayed  Results from last 7 days  Lab Units 01/10/19  2151   TROPONIN I ng/mL <0 02     Lab Results   Component Value Date    BLOODCX No Growth After 5 Days  01/10/2019    BLOODCX No Growth After 5 Days  01/10/2019    SPUTUMCULTUR 2+ Growth of  01/13/2019    SPUTUMCULTUR  01/13/2019     Commensal respiratory luh only; No significant growth of Staph aureus/MRSA or Pseudomonas aeruginosa       Scheduled Meds: Current Facility-Administered Medications:  acetaminophen 650 mg Oral Q6H PRN Lollie Crass, PA-C    acyclovir 400 mg Oral Daily Lojohnnaie Crass, PA-LOLA    albuterol 2 5 mg Nebulization Q6H PRN Lollie Crass, PA-C    cefepime 2,000 mg Intravenous Q12H Lollie Sauloss, PA-C Last Rate: 2,000 mg (01/16/19 4722)   chlorhexidine 15 mL Mouth/Throat BID Lojohnnaie Sauloss, PA-C    fluticasone 2 spray Nasal Daily Lojohnnaie Crass, PA-C    fluticasone-vilanterol 1 puff Inhalation Daily Scooter Silva, KYLE    gabapentin 300 mg Oral Daily Maia Villafuerte, KYLE    [START ON 1/18/2019] metoprolol succinate 50 mg Oral Daily Edelmira Maurice DO    morphine 15 mg Oral Q6H PRN Anmol Schwarz MD    ondansetron 4 mg Intravenous Q6H PRN Lojohnnaie Sauloss, KYLE    pantoprazole 40 mg Oral Early Morning Lojohnnaie Crass, PA-C    senna 43 mg Oral Daily Lojohnnaie Sauloss, PA-C      Continuous Infusions:    PRN Meds:   acetaminophen    albuterol    morphine    ondansetron      Physical exam:  Physical Exam  General appearance: alert and oriented, in no acute distress  Head: Normocephalic, without obvious abnormality, atraumatic  Eyes: conjunctivae/corneas clear  PERRL, EOM's intact  Fundi benign    Neck: no adenopathy, no carotid bruit, no JVD, supple, symmetrical, trachea midline and thyroid not enlarged, symmetric, no tenderness/mass/nodules  Lungs: rhonchi at bases bilateral  Heart: regular rate and rhythm, S1, S2 normal, no murmur, click, rub or gallop  Abdomen: soft, non-tender; bowel sounds normal; no masses,  no organomegaly  Extremities: extremities normal, warm and well-perfused; no cyanosis, clubbing, or edema  Pulses: 2+ and symmetric  Skin: Skin color, texture, turgor normal  No rashes or lesions  Neurologic: Mental status: Alert, oriented, thought content appropriate      VTE Pharmacologic Prophylaxis: Reason for no pharmacologic prophylaxis thrombocytopenia  VTE Mechanical Prophylaxis: sequential compression device    Counseling / Coordination of Care  Total floor / unit time spent today 20 minutes      Current Length of Stay: 6 day(s)    Current Patient Status: Inpatient       Code Status: Level 1 - Full Code

## 2019-01-17 NOTE — UTILIZATION REVIEW
Continued Stay Review    Date:  1/16/2019    Vitals: Blood pressure 104/63, pulse (!) 112, temperature 98 1 °F (36 7 °C), temperature source Temporal, resp  rate 20, height 5' (1 524 m), weight 77 1 kg (169 lb 15 6 oz), SpO2 96 %  ,Body mass index is 33 2 kg/m²  On O2 @ 6 liters - Weaned to 4 liters NC  93%    Assessment/Plan:   1  Acute hypoxic respiratory failure due to multifocal pneumonia- appreciate pulmonary recommendations  Pt improving on cefepime, flagyl and vanco  Day 7 of cefepime  Vanco/flagyl d/yasemin yesterday  procalcitonin decreased  mrsa negative  Sputum showing mixed luh  Blood cultures are negative  Will check procalcitonin in am  Will repeat cxr  Pt will need ambulatory desat study prior to discharge home       2  Lactic acidosis on admission, secondary to 1- resolved       3  Hypertension- stable  Continue current treatment     4  Acute toxic encephalopathy secondary to 1- resolved     5  Copd with out exacerbation- continue current treatment per pulmonary  Pt when discharged will go home on breo, atrovent nebs bid, and albuterol inhaler       6  Multiple myeloma- pt scheduled for chemo this Wednesday  Likely will need to reschedule due to number 1  Follow up with oncology outpt  Will not order labs for chemo at this time       7  Thrombocytopenia/anemia- due to number 6  Platelets increased after transfusion  Will continue to monitor to see if further transfusions will be needed  Will check cbc now       8  Epistaxis due to number 7- resolved       9  Hypokalemia- improved  Potassium was increase to tid dosing yesterday  Check bmp now       dispo- will order physical therapy  Pt will need home oxygen eval prior to discharge   Possible discharge in 24 to 48 hours if she continues to improve and pulmonary agrees         Medications:   Scheduled Meds:   Current Facility-Administered Medications:  KDur 40 po tid  acetaminophen 650 mg Oral Q6H PRN     acyclovir 400 mg Oral Daily     albuterol 2 5 mg Nebulization Q6H PRN  x 2  1/16    cefepime 2,000 mg Intravenous Q12H     chlorhexidine 15 mL Mouth/Throat BID     fluticasone 2 spray Nasal Daily     fluticasone-vilanterol 1 puff Inhalation Daily     gabapentin 300 mg Oral Daily     [metoprolol succinate 100 mg Oral Daily     morphine 15 mg Oral Q6H PRN  x 2  1/16    ondansetron 4 mg Intravenous Q6H PRN     pantoprazole 40 mg Oral Early Morning     senna 43 mg Oral Daily       Pertinent Labs/Diagnostic Results:   Cl 98,   CO2 35,  ,   WBC's 3 51,   H&H 8/26 6,  Plats 33  CXR: Stable appearance of multi lobar pneumonia  Probable small bilateral pleural effusions  1/17:  [address] today  Will need supplemental [place] O2 and OP PT        [address] Utilization Review Department  Phone: [phone]; Fax [phone]  [email or web address]  ATTENTION: Please call with any questions or concerns to [phone]  and carefully listen to the prompts so that you are directed to the right person  Send all requests for admission clinical reviews, approved or denied determinations and any other requests to fax [phone]   All voicemails are confidential

## 2019-01-17 NOTE — SOCIAL WORK
13:07  CM printed out respiratory therapy's notes and sent them over to [facility] as they requested an updated evaluation to see if pt's O2 needs have changed since she was 1st set up with home O2, which at that was only 2L  Pt is now requiring 4L on exertion  Notes sent  Pt will be updated  14:20  CM was made aware by attending that she put a script on chart for new O2 requirements  Scanned into [facility] and sent to [facility] to update pt's O2 needs  15:16  CM received a call from [facility] requesting a copy of pt's insurance card  Met with pt, made a copy, entered into [facility], sent to [facility]  Updated pt to call [facility] when she goes home to check on her home O2

## 2019-01-17 NOTE — PHYSICAL THERAPY NOTE
PT EVALUATION    Pt  Name: Remi Gonzalez  Pt  Age: 58 y o  MRN: [id number]  LENGTH OF STAY: 6    Patient Active Problem List   Diagnosis    Multifocal pneumonia    [facility] ([facility]    Hypertension    GERD (gastroesophageal reflux disease)    Thrombocytopenia ([facility]    Asthma       Admitting Diagnoses:   Lactic acidosis [E87 2]  Respiratory distress [R06 03]  SOB (shortness of breath) [R06 02]  Bandemia [D72 825]  Multifocal pneumonia [J18 9]    Past Medical History:   Diagnosis Date    Anemia     Asthma     Chronic constipation     Chronic pain     COPD (chronic obstructive pulmonary disease) (HCC)     CTS (carpal tunnel syndrome)     DJD (degenerative joint disease)     GERD (gastroesophageal reflux disease)     Hypertension     Multiple myeloma (HCC)     SAMIR (obstructive sleep apnea)     Osteonecrosis ([facility]     Sciatica     Thoracic aortic aneurysm (HCC)        Past Surgical History:   Procedure Laterality Date    BONE MARROW BIOPSY      COLONOSCOPY      PORTACATH PLACEMENT      REDUCTION MAMMAPLASTY      VERTEBROPLASTY         Imaging Studies:  XR chest pa & lateral   Final Result by Heather Hernandez DO (01/16 2458)      Stable appearance of multi lobar pneumonia  Probable small bilateral pleural effusions  Workstation performed: [id number]         XR chest portable   Final Result by Paul Rodriguez DO (01/13 6795)   Worsening pneumonia  Workstation performed: [id number]         X-ray chest 1 view portable   Final Result by Thao Agee MD (01/11 3011)      1  Left basilar opacity and patchy airspace opacity throughout the right lung suspicious for pneumonia  2   Emphysema  Workstation performed: [id number]         CTA ED chest PE study   Final Result by Ashley Hernandez MD (01/10 1077)      Multifocal pneumonia, as described above  Please see discussion    Follow-up after treatment to ensure complete radiographic resolution is recommended  Moderate emphysema  No evidence of pulmonary embolism is seen  There is rather extensive osseous abnormality, as described above, likely related to patient's history of multiple myeloma, worse compared with 2009  I personally discussed this study with CHLOE OLIVER on 1/10/2019 at 11:33 PM                            Workstation performed: [id number]            01/17/19 1220   Note Type   Note type Eval only   Pain Assessment   Pain Score No Pain   Home Living   Type of [address] One level; Laundry in basement; Other (Comment)  (1STE; 10steps to basement)   [address]; Other (Comment)  (rollator; home O2)   Prior Function   Level of [place] Independent with ADLs and functional mobility  (w/ SPC; rollator for community amb)   Lives With Spouse  (who works)   Jl Help From [employer])   ADL Assistance Independent   Falls in the last 6 months 1 to 4  (1x)   Vocational On disability   Comments (+) driving   Restrictions/Precautions   Weight Bearing Precautions Per Order No   Other Precautions Telemetry;O2;Fall Risk   General   Family/Caregiver Present No   Cognition   Overall Cognitive Status WFL   Arousal/Participation Alert   Orientation Level Oriented X4   Following Commands Follows multistep commands without difficulty   RUE Assessment   RUE Assessment WFL   RUE Strength   RUE Overall Strength Within Functional Limits - able to perform ADL tasks with strength   LUE Assessment   LUE Assessment WFL   LUE Strength   LUE Overall Strength Within Functional Limits - able to perform ADL tasks with strength   RLE Assessment   RLE Assessment WFL   Strength RLE   RLE Overall Strength 4+/5   LLE Assessment   LLE Assessment WFL   Strength LLE   LLE Overall Strength 4+/5   Coordination   Movements are Fluid and Coordinated 1   Sensation WFL   Bed Mobility   Supine to Sit 7  Independent   Sit to Supine 7  Independent   Transfers   Sit to Stand 6  Modified independent   Stand to Sit 6  Modified independent   Ambulation/Elevation   Gait pattern Decreased foot clearance   Gait Assistance 6  Modified independent   Additional items Other (Comment)  (O2)   Assistive Device Rolling walker   Distance 250'x1   Balance   Static Sitting Normal   Static Standing Normal   Ambulatory Good   Endurance Deficit   Endurance Deficit No   Activity Tolerance   Activity Tolerance Patient tolerated treatment well   Nurse Made Aware Jael   Assessment   Prognosis Good   Problem List Decreased endurance;Decreased strength   Assessment Pt 58 y o  female presented with shortness of breath for 2 days & episode of syncope w/ O2 sat in the 60's per EMS  Pt admitted for Acute on chronic hypoxic respiratory failure secondary to multilobar pneumonia & Epistaxis due to thrombocytopenia  PTA, pt reports being I w/ [facility] & w/ rollator for community distances  Pt referred to PT for mobility assessment & D/C planning  Pt demonstrates no significant decline in function to warrant skilled PT at this time  Pt  I/ modified I w/ overall functional mobility including amb w/ RW  Balance & gait WFL  (+) SOB after amb but relieved w/ rest  4li O2 maintained t/o session  SpO2 96% pre-amb; 88% after amb but recovered to 92% w/ rest  Pt states being close to her functional baseline and states no concerns about going back home  Pt instructed on energy conservation techniques w/ good understanding  Will D/C PT  Pt may return home when medically cleared  Pt may benefit from OPPT pulmonary rehab  MD notified     Barriers to Discharge None   Goals   Patient Goals to go home today   Treatment Day 0   Plan   Treatment/Interventions Spoke to nursing;Spoke to MD  (PT eval only)   PT Frequency Other (Comment)  (D/C PT)   Recommendation   Recommendation Outpatient PT;D/C PT  (for pulmonary rehab)   Equipment Recommended Walker  (RW or rollator, pt has DMEs at home)   PT - OK to Discharge Yes  (when medically cleared)   Barthel Index   Feeding 10   Bathing 5   Grooming Score 5   Dressing Score 10   Bladder Score 10   Bowels Score 10   Toilet Use Score 10   Transfers (Bed/Chair) Score 15   Mobility (Level Surface) Score 15   Stairs Score 0  (not tested but given level of I will anticipate no issues)   Barthel Index Score 90   Hx/personal factors: co-morbidities, dec caregiver support, telemetry, use of AD, fall risk and O2  Examination: dec endurance, low fall risk, assessed body system, balance, endurance, amb, D/C disposition & fall risk, SOB  Clinical: unpredictable (ongoing medical status, abnormal lab values, low fall risk and low bp)  Complexity: high     Shelbi Doyle, PT

## 2019-01-17 NOTE — RESPIRATORY THERAPY NOTE
Home Oxygen Qualifying Test       Patient name: Farzana Soto        : 1955   Date of Test:  2019  Diagnosis:      Home Oxygen Test:    **Medicare Guidelines require item(s) 1-5 on all ambulatory patients or 1 and 2 on non-ambulatory patients  1   Baseline SPO2 on Room Air at rest 83 %  2   SPO2 during exercise on Room Air n/a %  During exercise monitor SpO2  If SPO2 increases >=89% with ambulation do not add supplemental             oxygen  If <= 88% on room air add O2 via NC and titrate patient  Patient must be ambulated with O2 and titrated to > 88% with exertion  3   SPO2 on Oxygen at rest 91 % 2 lpm     4   SPO2 during exercise on Oxygen  90% a liter flow of 4 lpm     5   Exercise performed:  Walked for 6 min with walker at good pace also talked and instructed with pursed-lipped breathing            [x]  Supplemental Home Oxygen is indicated  []  Client does not qualify for home oxygen        Respiratory Additional Notes-     Frank Francis, RT

## 2019-01-17 NOTE — DISCHARGE SUMMARY
Discharge Summary - [address] [facility]    Patient Information: Annamae Lanes 58 y o  female MRN: [id number]  Unit/Bed#: [address] 2 ite [address] 220-01 Encounter: [id number]    Discharging Physician / Practitioner: Judge Tavares DO  PCP: No primary care provider on file  Admission Date: 1/10/2019  Discharge Date: 01/17/19    Disposition:     Home     Reason for Admission: sepsis due to multifocal pneumonia    Discharge Diagnoses:     Principal Problem:    Multifocal pneumonia  Active Problems:    Multiple myeloma ([address] )    Hypertension    GERD (gastroesophageal reflux disease)    Thrombocytopenia (HCC)    Asthma  Resolved Problems:    Lactic acidosis      Consultations During Hospital Stay:  · Pulmonary     Procedures Performed:     · none    Significant Findings / Test Results:   Xr Chest Portable  Result Date: 1/13/2019  Impression: Worsening pneumonia  X-ray Chest 1 View Portable  Result Date: 1/11/2019  Impression: 1  Left basilar opacity and patchy airspace opacity throughout the right lung suspicious for pneumonia  2   Emphysema  Xr Chest Pa & Lateral  Result Date: 1/16/2019  Impression: Stable appearance of multi lobar pneumonia  Probable small bilateral pleural effusions  Cta Ed Chest Pe Study  Result Date: 1/10/2019  Impression: Multifocal pneumonia, as described above  Please see discussion  Follow-up after treatment to ensure complete radiographic resolution is recommended  Moderate emphysema  No evidence of pulmonary embolism is seen  There is rather extensive osseous abnormality, as described above, likely related to patient's history of multiple myeloma, worse compared with 2009         Incidental Findings:   · none    Test Results Pending at Discharge (will require follow up):   · none     Outpatient Tests Requested:  Repeat cxr in 6 weeks    Hospital Course:     Annamae Lanes is a 58 y o  female patient who originally presented to the hospital on 1/10/2019 due to acute/chronic hypoxic respiratory failure due to multifocal pneumonia  She was evaluated by pulmonary  She was originally treated with cefepime, flagyl and vanco  Pt improved with this treatment  Her vanco and flagyl was d/yasemin as no signs of aspiration pna and mrsa swab was negative  She completed 8 days of cefepime and her procalcitonin was decreased  Her blood cultures are negative  She will need a repeat cxr in 6 weeks  She will require 4 liters of oxygen with exertion and 2 liters at rest  She originally had lactic acidosis due to sepsis  It has since resolved  Pt also had acute toxic encephalopathy due to the sepsis that has since resolved  For her copd she did not have an exacerbation  She was evaluated by pulmonary and will be discharged on breo, her atrovent nebs and albuterol  Pt has multiple myeloma and her chemo will need to be rescheduled due to her recent pneumonia  She will follow up with oncology outpt  She has thrombocytopenia and anemia due to this  She was transfused platelets during her hospital stay due to thrombocytopenia  She has a hx of htn and had mild hypotension in the hospital  Her norvasc was d/yasemin  Her metoprolol was decreased to 50mg daily  Pt was discharged to home with outpt pulmonary rehab       Discharge Day Visit / Exam:     * Please refer to separate progress note for these details *    Discharge instructions/Information to patient and family:   See after visit summary for information provided to patient and family  Provisions for Follow-Up Care:  See after visit summary for information related to follow-up care and any pertinent home health orders  Discharge Statement:  I spent 36 minutes discharging the patient  This time was spent on the day of discharge  I had direct contact with the patient on the day of discharge   Greater than 50% of the total time was spent examining patient, answering all patient questions, arranging and discussing plan of care with patient as well as directly providing post-discharge instructions  Additional time then spent on discharge activities  Discharge Medications:  See after visit summary for reconciled discharge medications provided to patient and family

## 2022-04-12 ENCOUNTER — NURSING HOME VISIT (OUTPATIENT)
Dept: WOUND CARE | Facility: HOSPITAL | Age: 67
End: 2022-04-12
Payer: MEDICARE

## 2022-04-12 DIAGNOSIS — R26.2 AMBULATORY DYSFUNCTION: ICD-10-CM

## 2022-04-12 DIAGNOSIS — L89.322 PRESSURE INJURY OF LEFT BUTTOCK, STAGE 2 (HCC): ICD-10-CM

## 2022-04-12 DIAGNOSIS — L89.310 PRESSURE INJURY OF RIGHT BUTTOCK, UNSTAGEABLE (HCC): Primary | ICD-10-CM

## 2022-04-12 PROCEDURE — 99305 1ST NF CARE MODERATE MDM 35: CPT | Performed by: NURSE PRACTITIONER

## 2022-04-12 NOTE — PROGRESS NOTES
[address] MANAGEMENT   AND [facility]PER[facility]ENTER       Patient ID: Nino Bowden is a 78 y o  female Date of Birth 1955     Location of Service: [facility]    Performed wound round with: Wound team       Chief Complaint   Patient presents with    New Patient Visit     Buttocks       Wound Instructions:  Orders Placed This Encounter   Procedures    Wound cleansing and dressings     Wound:  Buttocks  Discontinue previous wound order  Cleanse the wound bed with NSS   Apply non-sting skin prep to periwound area  Apply medihoney gel to wound bed on the right proximal wound  Apply zinc oxide to wound on the left buttock and right distal wound  Cover with bordered foam   Frequency : daily  and prn for soiling  Order air mattress  Offload all wounds  Turn and reposition frequently  Increase protein intake  Monitor for any sign of infection or worsening, inform PCP or patient's primary physician in your facility  Standing Status:   Future     Standing Expiration Date:   4/12/2023       Allergies  Aspirin, Lisinopril, Nsaids, and Penicillins      Assessment & Plan:  1  Pressure injury of right buttock, unstageable ([facility])  Assessment & Plan:  Right buttock  - POA to SNF  - as per patient, she had the wound for a long time    Assessment and plan  - 2 wounds on the right buttock  - Proximal wound - wound size is 1 x 0 6 x 0 3 cm , 100% slough, with no obvious sign of infection  - distal wound - wound size is 3 x 0 3 x 0 1 cm , 100% granulation, with no obvious sign of infection  - poor appetite, consumed 25 - 100% of her meals  - ambulatory with walker  - intact sensation    Plan  - local wound care with zinc oxide and medihoney  - continued offload  - increased protein intake  Followup : two weeks        Orders:  -     Wound cleansing and dressings; Future    2   Pressure injury of left buttock, stage 2 ([address] )  Assessment & Plan:  Left buttock  - POA to SNF  - as per patient, she had the wound for a long time    Assessment and plan  - wound size is 0 5 x 0 5 x 0 1 cm , 100% epithelial, with no obvious sign of infection  - poor appetite, consumed 25 - 100% of her meals  - ambulatory with walker  - intact sensation    Plan  - local wound care with zinc oxide  - continued offload  - increased protein intake  Followup : two weeks        Orders:  -     Wound cleansing and dressings; Future    3  Ambulatory dysfunction  Assessment & Plan:  On STR             Subjective: This is a 50-year-old female referred to our service for wound on the buttocks   Patient have a complex medical history including but not limited toMultiple myeloma not having achieved remission ([address] ) , Centrilobular emphysema ([facility] [address] ); Cancer related pain;Type 2 diabetes mellitus without complication, without long-term current use of insulin ([facility]);SAMIR (obstructive sleep apnea); [facility] Patient was referred by Senior Care Team  Patient was seen in collaboration with the facility wound team      There is no record of wound on the past medical record  As per patient, " I had the wound for a long time"  Possible etiology is pressure ulcer  Patient is incontinent of both bowel and bladder  Can ambulate with a walker  No current treatment  Review of Systems   Constitutional: Negative  HENT: Negative  Eyes: Negative  Respiratory: Negative  Cardiovascular: Negative for chest pain and leg swelling  Gastrointestinal: Negative  Endocrine: Negative  Genitourinary: Negative  Musculoskeletal: Positive for gait problem  Skin: Positive for wound  See HPI   Neurological: Negative for dizziness and headaches  Psychiatric/Behavioral: Negative for behavioral problems  Objective: There were no vitals taken for this visit  Physical Exam  Constitutional:       Appearance: Normal appearance  HENT:      Head: Normocephalic and atraumatic  Nose: Nose normal       Mouth/Throat:      Pharynx: Oropharynx is clear     Eyes: Conjunctiva/sclera: Conjunctivae normal    Pulmonary:      Effort: Pulmonary effort is normal    Abdominal:      Tenderness: There is no abdominal tenderness  There is no guarding  Genitourinary:     Comments: incontinent  Musculoskeletal:         General: No tenderness  Cervical back: Normal range of motion  Right lower leg: No edema  Left lower leg: No edema  Comments: LROM   Skin:     General: Skin is warm  Findings: Lesion present  Comments: Right buttock  Proximal - wound size is 1 x 0 6 x 0 3 cm , 100% slough, no malodor, with small amount of serous drainage, periwound is dry, no obvious sign of infection, denies pain  Distal - wound size is 3 x 0 3 x 0 1 cm  , 100% granulation, no malodor, with small amount of serous drainage, periwound is dry, no obvious sign of infection, denies pain    Left buttock - wound size is 0 5 x 0 5 x 0 1 cm  , 100% epithelial, no drainage, no obvious sign of infection, periwound is normal, denies pain, no malodor   Neurological:      Mental Status: She is alert  Gait: Gait abnormal    Psychiatric:         Mood and Affect: Mood normal          Behavior: Behavior normal               Procedures           Patient's care was coordinated with nursing facility staff  Recent vitals, labs and updated medications were reviewed on EMR or chart system of facility   Past Medical, surgical, social, medication and allergy history and patient's previous records were reviewed and updated as appropriate:     Patient Active Problem List   Diagnosis    Multifocal pneumonia    Multiple myeloma ([facility] [address] )    Hypertension    GERD (gastroesophageal reflux disease)    Thrombocytopenia (HCC)    Asthma    Pressure injury of right buttock, unstageable ([facility] [address] )    Pressure injury of left buttock, stage 2 ([facility] [address] )    Ambulatory dysfunction     Past Medical History:   Diagnosis Date    Anemia     Asthma     Chronic constipation     Chronic pain     COPD (chronic obstructive pulmonary disease) (HCC)     CTS (carpal tunnel syndrome)     DJD (degenerative joint disease)     GERD (gastroesophageal reflux disease)     Hypertension     Multiple myeloma (HCC)     SAMIR (obstructive sleep apnea)     Osteonecrosis ([address] )     Sciatica     Thoracic aortic aneurysm (HCC)      Past Surgical History:   Procedure Laterality Date    BONE MARROW BIOPSY      COLONOSCOPY      PORTACATH PLACEMENT      REDUCTION MAMMAPLASTY      VERTEBROPLASTY       Social History     Socioeconomic History    Marital status: /Civil Union     Spouse name: None    Number of children: None    Years of education: None    Highest education level: None   Occupational History    None   Tobacco Use    Smoking status: Former Smoker    Smokeless tobacco: Never Used   Substance and Sexual Activity    Alcohol use: No    Drug use: No    Sexual activity: None   Other Topics Concern    None   Social History Narrative    None     Social Determinants of Health     Financial Resource Strain: Not on file   Food Insecurity: Not on file   Transportation Needs: Not on file   Physical Activity: Not on file   Stress: Not on file   Social Connections: Not on file   Intimate Partner Violence: Not on file   Housing Stability: Not on file        Current Outpatient Medications:     acetaminophen (TYLENOL) 500 mg tablet, Take 500 mg by mouth every 6 (six) hours as needed for mild pain, Disp: , Rfl:     acyclovir (ZOVIRAX) 400 MG tablet, Take 400 mg by mouth every 4 (four) hours while awake, Disp: , Rfl:     albuterol (PROAIR HFA) 90 mcg/act inhaler, Inhale 2 puffs every 6 (six) hours as needed for wheezing, Disp: 8 5 g, Rfl: 2    ascorbic acid (VITAMIN C) 500 mg tablet, Take 500 mg by mouth daily, Disp: , Rfl:     aspirin 81 mg chewable tablet, Chew 81 mg daily, Disp: , Rfl:     Bioflavonoid Products (BIOFLEX PO), Take 1 tablet by mouth daily, Disp: , Rfl:     BIOTIN PO, Take 5,000 mcg by mouth 2 (two) times a day, Disp: , Rfl:     Calcium Carb-Cholecalciferol (OS-ANTONIETA PO), Take 1 tablet by mouth daily, Disp: , Rfl:     CALCIUM CARBONATE-VIT D-MIN PO, Take 1 tablet by mouth daily, Disp: , Rfl:     chlorhexidine (PERIDEX) 0 12 % solution, Apply 15 mL to the mouth or throat 2 (two) times a day, Disp: , Rfl:     Dexamethasone (DECADRON PO), Take by mouth, Disp: , Rfl:     fluticasone (FLONASE) 50 mcg/act nasal spray, 2 sprays into each nostril daily, Disp: , Rfl:     fluticasone-vilanterol (BREO ELLIPTA) 100-25 mcg/inh inhaler, Inhale 1 puff daily Rinse mouth after use , Disp: , Rfl:     gabapentin (NEURONTIN) 300 mg capsule, Take 100 mg by mouth 2 (two) times a day, Disp: , Rfl:     ipratropium (ATROVENT) 0 02 % nebulizer solution, Take 0 25 mg by nebulization 2 (two) times a day, Disp: , Rfl:     LORazepam (ATIVAN) 0 5 mg tablet, Take 0 5 mg by mouth every 6 (six) hours as needed for anxiety, Disp: , Rfl:     metoprolol succinate (TOPROL-XL) 50 mg 24 hr tablet, Take 1 tablet (50 mg total) by mouth daily, Disp: 30 tablet, Rfl: 1    morphine (MS CONTIN) 15 mg 12 hr tablet, Take 15 mg by mouth every 4 (four) hours as needed for severe pain, Disp: , Rfl:     Multiple Vitamin (MULTIVITAMIN) tablet, Take 1 tablet by mouth daily, Disp: , Rfl:     omeprazole (PriLOSEC) 20 mg delayed release capsule, Take 20 mg by mouth daily, Disp: , Rfl:     potassium chloride (K-DUR,KLOR-CON) 10 mEq tablet, Take 20 mEq by mouth 2 (two) times a day, Disp: , Rfl:     prochlorperazine (COMPAZINE) 10 mg tablet, Take 10 mg by mouth every 6 (six) hours as needed for nausea or vomiting, Disp: , Rfl:     SENNOSIDES PO, Take 45 mg by mouth daily, Disp: , Rfl:   History reviewed  No pertinent family history  Coordination of Care: Wound team aware of the treatment plan  Facility nurse will provide wound treatment and monitor the wound for any changes       Patient / Staff education : Patient / Staff was given education on sign of infection and pressure ulcer prevention  Patient/ Staff verbalized understanding     Follow up :  Return in about 2 weeks (around 4/26/2022)  Voice-recognition software may have been used in the preparation of this document  Occasional wrong word or "sound-alike" substitutions may have occurred due to the inherent limitations of voice recognition software  Interpretation should be guided by context        CHASE Degroot

## 2022-04-12 NOTE — PATIENT INSTRUCTIONS
Orders Placed This Encounter   Procedures    Wound cleansing and dressings     Wound:  Buttocks  Discontinue previous wound order  Cleanse the wound bed with NSS   Apply non-sting skin prep to periwound area  Apply medihoney gel to wound bed on the right proximal wound  Apply zinc oxide to wound on the left buttock and right distal wound  Cover with bordered foam   Frequency : daily  and prn for soiling  Order air mattress  Offload all wounds  Turn and reposition frequently  Increase protein intake  Monitor for any sign of infection or worsening, inform PCP or patient's primary physician in your facility       Standing Status:   Future     Standing Expiration Date:   4/12/2023

## 2022-04-12 NOTE — ASSESSMENT & PLAN NOTE
Left buttock  - POA to SNF  - as per patient, she had the wound for a long time    Assessment and plan  - wound size is 0 5 x 0 5 x 0 1 cm , 100% epithelial, with no obvious sign of infection  - poor appetite, consumed 25 - 100% of her meals  - ambulatory with walker  - intact sensation    Plan  - local wound care with zinc oxide  - continued offload  - increased protein intake  Followup : two weeks

## 2022-04-12 NOTE — ASSESSMENT & PLAN NOTE
Right buttock  - POA to SNF  - as per patient, she had the wound for a long time    Assessment and plan  - 2 wounds on the right buttock  - Proximal wound - wound size is 1 x 0 6 x 0 3 cm , 100% slough, with no obvious sign of infection  - distal wound - wound size is 3 x 0 3 x 0 1 cm , 100% granulation, with no obvious sign of infection  - poor appetite, consumed 25 - 100% of her meals  - ambulatory with walker  - intact sensation    Plan  - local wound care with zinc oxide and medihoney  - continued offload  - increased protein intake  Followup : two weeks

## 2022-05-09 ENCOUNTER — HOSPITAL ENCOUNTER (INPATIENT)
Facility: HOSPITAL | Age: 67
LOS: 4 days | Discharge: NURSING, REHAB OR LONG-TERM CARE | DRG: 871 | End: 2022-05-13
Attending: EMERGENCY MEDICINE | Admitting: INTERNAL MEDICINE
Payer: MEDICARE

## 2022-05-09 ENCOUNTER — APPOINTMENT (INPATIENT)
Dept: CT IMAGING | Facility: HOSPITAL | Age: 67
DRG: 871 | End: 2022-05-09
Payer: MEDICARE

## 2022-05-09 ENCOUNTER — APPOINTMENT (EMERGENCY)
Dept: RADIOLOGY | Facility: HOSPITAL | Age: 67
DRG: 871 | End: 2022-05-09
Payer: MEDICARE

## 2022-05-09 DIAGNOSIS — E11.9 TYPE 2 DIABETES MELLITUS WITHOUT COMPLICATION, WITHOUT LONG-TERM CURRENT USE OF INSULIN (HCC): ICD-10-CM

## 2022-05-09 DIAGNOSIS — J18.9 MULTIFOCAL PNEUMONIA: ICD-10-CM

## 2022-05-09 DIAGNOSIS — R00.0 TACHYCARDIA: Primary | ICD-10-CM

## 2022-05-09 DIAGNOSIS — N39.0 UTI (URINARY TRACT INFECTION): ICD-10-CM

## 2022-05-09 DIAGNOSIS — F11.20 CONTINUOUS OPIOID DEPENDENCE (HCC): ICD-10-CM

## 2022-05-09 DIAGNOSIS — A41.9 SEPSIS (HCC): ICD-10-CM

## 2022-05-09 DIAGNOSIS — C90.00 MULTIPLE MYELOMA, REMISSION STATUS UNSPECIFIED (HCC): ICD-10-CM

## 2022-05-09 DIAGNOSIS — I10 ESSENTIAL HYPERTENSION: ICD-10-CM

## 2022-05-09 PROBLEM — N17.9 AKI (ACUTE KIDNEY INJURY) (HCC): Status: ACTIVE | Noted: 2022-05-09

## 2022-05-09 PROBLEM — I47.1 SVT (SUPRAVENTRICULAR TACHYCARDIA) (HCC): Status: ACTIVE | Noted: 2022-05-09

## 2022-05-09 PROBLEM — J96.10 CHRONIC RESPIRATORY FAILURE (HCC): Status: ACTIVE | Noted: 2022-05-09

## 2022-05-09 LAB
2HR DELTA HS TROPONIN: -6 NG/L
4HR DELTA HS TROPONIN: -5 NG/L
ALBUMIN SERPL BCP-MCNC: 2.1 G/DL (ref 3.5–5)
ALP SERPL-CCNC: 154 U/L (ref 46–116)
ALT SERPL W P-5'-P-CCNC: 33 U/L (ref 12–78)
ANION GAP SERPL CALCULATED.3IONS-SCNC: 10 MMOL/L (ref 4–13)
APTT PPP: 33 SECONDS (ref 23–37)
AST SERPL W P-5'-P-CCNC: 34 U/L (ref 5–45)
ATRIAL RATE: 106 BPM
ATRIAL RATE: 108 BPM
ATRIAL RATE: 139 BPM
ATRIAL RATE: 153 BPM
ATRIAL RATE: 184 BPM
BACTERIA UR QL AUTO: ABNORMAL /HPF
BASOPHILS # BLD MANUAL: 0 THOUSAND/UL (ref 0–0.1)
BASOPHILS NFR MAR MANUAL: 0 % (ref 0–1)
BILIRUB SERPL-MCNC: 0.49 MG/DL (ref 0.2–1)
BILIRUB UR QL STRIP: NEGATIVE
BUN SERPL-MCNC: 12 MG/DL (ref 5–25)
CA-I BLD-SCNC: 1.32 MMOL/L (ref 1.12–1.32)
CALCIUM ALBUM COR SERPL-MCNC: 11.6 MG/DL (ref 8.3–10.1)
CALCIUM SERPL-MCNC: 10.1 MG/DL (ref 8.3–10.1)
CARDIAC TROPONIN I PNL SERPL HS: 16 NG/L
CARDIAC TROPONIN I PNL SERPL HS: 17 NG/L
CARDIAC TROPONIN I PNL SERPL HS: 22 NG/L
CHLORIDE SERPL-SCNC: 102 MMOL/L (ref 100–108)
CLARITY UR: ABNORMAL
CO2 SERPL-SCNC: 26 MMOL/L (ref 21–32)
COLOR UR: YELLOW
CREAT SERPL-MCNC: 1.26 MG/DL (ref 0.6–1.3)
EOSINOPHIL # BLD MANUAL: 0 THOUSAND/UL (ref 0–0.4)
EOSINOPHIL NFR BLD MANUAL: 0 % (ref 0–6)
ERYTHROCYTE [DISTWIDTH] IN BLOOD BY AUTOMATED COUNT: 19.6 % (ref 11.6–15.1)
FINE GRAN CASTS URNS QL MICRO: ABNORMAL /LPF
FLUAV RNA RESP QL NAA+PROBE: NEGATIVE
FLUBV RNA RESP QL NAA+PROBE: NEGATIVE
GFR SERPL CREATININE-BSD FRML MDRD: 44 ML/MIN/1.73SQ M
GLUCOSE SERPL-MCNC: 141 MG/DL (ref 65–140)
GLUCOSE SERPL-MCNC: 152 MG/DL (ref 65–140)
GLUCOSE SERPL-MCNC: 160 MG/DL (ref 65–140)
GLUCOSE SERPL-MCNC: 218 MG/DL (ref 65–140)
GLUCOSE UR STRIP-MCNC: ABNORMAL MG/DL
HCT VFR BLD AUTO: 30.1 % (ref 34.8–46.1)
HGB BLD-MCNC: 9.8 G/DL (ref 11.5–15.4)
HGB UR QL STRIP.AUTO: ABNORMAL
INR PPP: 1.21 (ref 0.84–1.19)
KETONES UR STRIP-MCNC: ABNORMAL MG/DL
LACTATE SERPL-SCNC: 1.4 MMOL/L (ref 0.5–2)
LACTATE SERPL-SCNC: 2.4 MMOL/L (ref 0.5–2)
LACTATE SERPL-SCNC: 3 MMOL/L (ref 0.5–2)
LACTATE SERPL-SCNC: 3.2 MMOL/L (ref 0.5–2)
LACTATE SERPL-SCNC: 3.8 MMOL/L (ref 0.5–2)
LEUKOCYTE ESTERASE UR QL STRIP: ABNORMAL
LYMPHOCYTES # BLD AUTO: 0.27 THOUSAND/UL (ref 0.6–4.47)
LYMPHOCYTES # BLD AUTO: 3 % (ref 14–44)
MAGNESIUM SERPL-MCNC: 0.8 MG/DL (ref 1.6–2.6)
MCH RBC QN AUTO: 32.9 PG (ref 26.8–34.3)
MCHC RBC AUTO-ENTMCNC: 32.6 G/DL (ref 31.4–37.4)
MCV RBC AUTO: 101 FL (ref 82–98)
MICROCYTES BLD QL AUTO: PRESENT
MONOCYTES # BLD AUTO: 0.73 THOUSAND/UL (ref 0–1.22)
MONOCYTES NFR BLD: 8 % (ref 4–12)
MYELOCYTES NFR BLD MANUAL: 1 % (ref 0–1)
NEUTROPHILS # BLD MANUAL: 7.95 THOUSAND/UL (ref 1.85–7.62)
NEUTS BAND NFR BLD MANUAL: 2 % (ref 0–8)
NEUTS SEG NFR BLD AUTO: 85 % (ref 43–75)
NITRITE UR QL STRIP: POSITIVE
NON-SQ EPI CELLS URNS QL MICRO: ABNORMAL /HPF
P AXIS: 45 DEGREES
P AXIS: 48 DEGREES
P AXIS: 53 DEGREES
P AXIS: 55 DEGREES
PH UR STRIP.AUTO: 6 [PH]
PLATELET # BLD AUTO: 35 THOUSANDS/UL (ref 149–390)
PLATELET BLD QL SMEAR: ABNORMAL
PMV BLD AUTO: 12.5 FL (ref 8.9–12.7)
POTASSIUM SERPL-SCNC: 3.6 MMOL/L (ref 3.5–5.3)
PR INTERVAL: 142 MS
PR INTERVAL: 158 MS
PR INTERVAL: 168 MS
PR INTERVAL: 192 MS
PROCALCITONIN SERPL-MCNC: 9.6 NG/ML
PROT SERPL-MCNC: 4.9 G/DL (ref 6.4–8.2)
PROT UR STRIP-MCNC: ABNORMAL MG/DL
PROTHROMBIN TIME: 14.9 SECONDS (ref 11.6–14.5)
QRS AXIS: 31 DEGREES
QRS AXIS: 58 DEGREES
QRS AXIS: 62 DEGREES
QRS AXIS: 63 DEGREES
QRS AXIS: 66 DEGREES
QRSD INTERVAL: 78 MS
QRSD INTERVAL: 84 MS
QRSD INTERVAL: 90 MS
QT INTERVAL: 256 MS
QT INTERVAL: 260 MS
QT INTERVAL: 310 MS
QT INTERVAL: 332 MS
QT INTERVAL: 348 MS
QTC INTERVAL: 415 MS
QTC INTERVAL: 441 MS
QTC INTERVAL: 448 MS
QTC INTERVAL: 466 MS
QTC INTERVAL: 469 MS
RBC # BLD AUTO: 2.98 MILLION/UL (ref 3.81–5.12)
RBC #/AREA URNS AUTO: ABNORMAL /HPF
RSV RNA RESP QL NAA+PROBE: NEGATIVE
SARS-COV-2 RNA RESP QL NAA+PROBE: NEGATIVE
SODIUM SERPL-SCNC: 138 MMOL/L (ref 136–145)
SP GR UR STRIP.AUTO: >=1.03 (ref 1–1.03)
T WAVE AXIS: 38 DEGREES
T WAVE AXIS: 46 DEGREES
T WAVE AXIS: 54 DEGREES
T WAVE AXIS: 57 DEGREES
T WAVE AXIS: 59 DEGREES
UROBILINOGEN UR QL STRIP.AUTO: 0.2 E.U./DL
VARIANT LYMPHS # BLD AUTO: 1 %
VENTRICULAR RATE: 106 BPM
VENTRICULAR RATE: 108 BPM
VENTRICULAR RATE: 138 BPM
VENTRICULAR RATE: 153 BPM
VENTRICULAR RATE: 184 BPM
WBC # BLD AUTO: 9.14 THOUSAND/UL (ref 4.31–10.16)
WBC #/AREA URNS AUTO: ABNORMAL /HPF

## 2022-05-09 PROCEDURE — 83605 ASSAY OF LACTIC ACID: CPT | Performed by: EMERGENCY MEDICINE

## 2022-05-09 PROCEDURE — 84145 PROCALCITONIN (PCT): CPT | Performed by: EMERGENCY MEDICINE

## 2022-05-09 PROCEDURE — 96374 THER/PROPH/DIAG INJ IV PUSH: CPT

## 2022-05-09 PROCEDURE — 92610 EVALUATE SWALLOWING FUNCTION: CPT

## 2022-05-09 PROCEDURE — 99291 CRITICAL CARE FIRST HOUR: CPT | Performed by: EMERGENCY MEDICINE

## 2022-05-09 PROCEDURE — 99223 1ST HOSP IP/OBS HIGH 75: CPT | Performed by: INTERNAL MEDICINE

## 2022-05-09 PROCEDURE — 85025 COMPLETE CBC W/AUTO DIFF WBC: CPT | Performed by: EMERGENCY MEDICINE

## 2022-05-09 PROCEDURE — 82330 ASSAY OF CALCIUM: CPT | Performed by: EMERGENCY MEDICINE

## 2022-05-09 PROCEDURE — 96375 TX/PRO/DX INJ NEW DRUG ADDON: CPT

## 2022-05-09 PROCEDURE — 85730 THROMBOPLASTIN TIME PARTIAL: CPT | Performed by: EMERGENCY MEDICINE

## 2022-05-09 PROCEDURE — 99285 EMERGENCY DEPT VISIT HI MDM: CPT

## 2022-05-09 PROCEDURE — 96361 HYDRATE IV INFUSION ADD-ON: CPT

## 2022-05-09 PROCEDURE — 0T2BX0Z CHANGE DRAINAGE DEVICE IN BLADDER, EXTERNAL APPROACH: ICD-10-PCS | Performed by: INTERNAL MEDICINE

## 2022-05-09 PROCEDURE — 36415 COLL VENOUS BLD VENIPUNCTURE: CPT | Performed by: EMERGENCY MEDICINE

## 2022-05-09 PROCEDURE — 71045 X-RAY EXAM CHEST 1 VIEW: CPT

## 2022-05-09 PROCEDURE — 81001 URINALYSIS AUTO W/SCOPE: CPT | Performed by: EMERGENCY MEDICINE

## 2022-05-09 PROCEDURE — 70450 CT HEAD/BRAIN W/O DYE: CPT

## 2022-05-09 PROCEDURE — 87186 SC STD MICRODIL/AGAR DIL: CPT | Performed by: EMERGENCY MEDICINE

## 2022-05-09 PROCEDURE — 85610 PROTHROMBIN TIME: CPT | Performed by: EMERGENCY MEDICINE

## 2022-05-09 PROCEDURE — 94640 AIRWAY INHALATION TREATMENT: CPT

## 2022-05-09 PROCEDURE — 93010 ELECTROCARDIOGRAM REPORT: CPT | Performed by: INTERNAL MEDICINE

## 2022-05-09 PROCEDURE — 87086 URINE CULTURE/COLONY COUNT: CPT | Performed by: EMERGENCY MEDICINE

## 2022-05-09 PROCEDURE — 85027 COMPLETE CBC AUTOMATED: CPT | Performed by: EMERGENCY MEDICINE

## 2022-05-09 PROCEDURE — 96365 THER/PROPH/DIAG IV INF INIT: CPT

## 2022-05-09 PROCEDURE — G1004 CDSM NDSC: HCPCS

## 2022-05-09 PROCEDURE — 83605 ASSAY OF LACTIC ACID: CPT | Performed by: INTERNAL MEDICINE

## 2022-05-09 PROCEDURE — 82948 REAGENT STRIP/BLOOD GLUCOSE: CPT

## 2022-05-09 PROCEDURE — 84484 ASSAY OF TROPONIN QUANT: CPT | Performed by: EMERGENCY MEDICINE

## 2022-05-09 PROCEDURE — 83735 ASSAY OF MAGNESIUM: CPT | Performed by: INTERNAL MEDICINE

## 2022-05-09 PROCEDURE — 94760 N-INVAS EAR/PLS OXIMETRY 1: CPT

## 2022-05-09 PROCEDURE — 87040 BLOOD CULTURE FOR BACTERIA: CPT | Performed by: EMERGENCY MEDICINE

## 2022-05-09 PROCEDURE — 93005 ELECTROCARDIOGRAM TRACING: CPT

## 2022-05-09 PROCEDURE — 84484 ASSAY OF TROPONIN QUANT: CPT | Performed by: INTERNAL MEDICINE

## 2022-05-09 PROCEDURE — 87077 CULTURE AEROBIC IDENTIFY: CPT | Performed by: EMERGENCY MEDICINE

## 2022-05-09 PROCEDURE — 0241U HB NFCT DS VIR RESP RNA 4 TRGT: CPT | Performed by: EMERGENCY MEDICINE

## 2022-05-09 PROCEDURE — 80053 COMPREHEN METABOLIC PANEL: CPT | Performed by: EMERGENCY MEDICINE

## 2022-05-09 PROCEDURE — 85007 BL SMEAR W/DIFF WBC COUNT: CPT | Performed by: EMERGENCY MEDICINE

## 2022-05-09 PROCEDURE — 96367 TX/PROPH/DG ADDL SEQ IV INF: CPT

## 2022-05-09 RX ORDER — GABAPENTIN 100 MG/1
100 CAPSULE ORAL 2 TIMES DAILY
Status: DISCONTINUED | OUTPATIENT
Start: 2022-05-09 | End: 2022-05-14 | Stop reason: HOSPADM

## 2022-05-09 RX ORDER — POTASSIUM CHLORIDE 20 MEQ/1
20 TABLET, EXTENDED RELEASE ORAL DAILY
Status: DISCONTINUED | OUTPATIENT
Start: 2022-05-09 | End: 2022-05-10

## 2022-05-09 RX ORDER — ADENOSINE 3 MG/ML
6 INJECTION INTRAVENOUS ONCE
Status: COMPLETED | OUTPATIENT
Start: 2022-05-09 | End: 2022-05-09

## 2022-05-09 RX ORDER — LEVALBUTEROL INHALATION SOLUTION 0.63 MG/3ML
0.63 SOLUTION RESPIRATORY (INHALATION) EVERY 8 HOURS PRN
Status: DISCONTINUED | OUTPATIENT
Start: 2022-05-09 | End: 2022-05-14 | Stop reason: HOSPADM

## 2022-05-09 RX ORDER — ASCORBIC ACID 500 MG
500 TABLET ORAL DAILY
Status: DISCONTINUED | OUTPATIENT
Start: 2022-05-09 | End: 2022-05-14 | Stop reason: HOSPADM

## 2022-05-09 RX ORDER — ACYCLOVIR 800 MG/1
400 TABLET ORAL
Status: DISCONTINUED | OUTPATIENT
Start: 2022-05-09 | End: 2022-05-09

## 2022-05-09 RX ORDER — INSULIN LISPRO 100 [IU]/ML
1-5 INJECTION, SOLUTION INTRAVENOUS; SUBCUTANEOUS
Status: DISCONTINUED | OUTPATIENT
Start: 2022-05-09 | End: 2022-05-14 | Stop reason: HOSPADM

## 2022-05-09 RX ORDER — ADENOSINE 3 MG/ML
INJECTION, SOLUTION INTRAVENOUS
Status: COMPLETED
Start: 2022-05-09 | End: 2022-05-09

## 2022-05-09 RX ORDER — PANTOPRAZOLE SODIUM 20 MG/1
20 TABLET, DELAYED RELEASE ORAL
Status: DISCONTINUED | OUTPATIENT
Start: 2022-05-09 | End: 2022-05-14 | Stop reason: HOSPADM

## 2022-05-09 RX ORDER — BISACODYL 10 MG
10 SUPPOSITORY, RECTAL RECTAL DAILY
COMMUNITY
End: 2022-05-13

## 2022-05-09 RX ORDER — LORAZEPAM 0.5 MG/1
0.5 TABLET ORAL EVERY 6 HOURS PRN
Status: DISCONTINUED | OUTPATIENT
Start: 2022-05-09 | End: 2022-05-14 | Stop reason: HOSPADM

## 2022-05-09 RX ORDER — FLUTICASONE FUROATE AND VILANTEROL 100; 25 UG/1; UG/1
1 POWDER RESPIRATORY (INHALATION) DAILY
Status: DISCONTINUED | OUTPATIENT
Start: 2022-05-09 | End: 2022-05-14 | Stop reason: HOSPADM

## 2022-05-09 RX ORDER — ONDANSETRON 2 MG/ML
4 INJECTION INTRAMUSCULAR; INTRAVENOUS EVERY 6 HOURS PRN
Status: DISCONTINUED | OUTPATIENT
Start: 2022-05-09 | End: 2022-05-14 | Stop reason: HOSPADM

## 2022-05-09 RX ORDER — METOPROLOL SUCCINATE 50 MG/1
50 TABLET, EXTENDED RELEASE ORAL 2 TIMES DAILY
Status: DISCONTINUED | OUTPATIENT
Start: 2022-05-09 | End: 2022-05-09

## 2022-05-09 RX ORDER — SODIUM CHLORIDE 9 MG/ML
100 INJECTION, SOLUTION INTRAVENOUS CONTINUOUS
Status: DISCONTINUED | OUTPATIENT
Start: 2022-05-09 | End: 2022-05-10

## 2022-05-09 RX ORDER — ACETAMINOPHEN 650 MG/1
650 SUPPOSITORY RECTAL ONCE
Status: COMPLETED | OUTPATIENT
Start: 2022-05-09 | End: 2022-05-09

## 2022-05-09 RX ORDER — MICONAZOLE
POWDER (GRAM) MISCELLANEOUS
COMMUNITY
End: 2022-05-13

## 2022-05-09 RX ORDER — METOPROLOL TARTRATE 5 MG/5ML
5 INJECTION INTRAVENOUS ONCE
Status: COMPLETED | OUTPATIENT
Start: 2022-05-09 | End: 2022-05-09

## 2022-05-09 RX ORDER — ONDANSETRON HYDROCHLORIDE 8 MG/1
TABLET, FILM COATED ORAL EVERY 8 HOURS PRN
COMMUNITY

## 2022-05-09 RX ORDER — MORPHINE SULFATE 15 MG/1
15 TABLET, FILM COATED, EXTENDED RELEASE ORAL EVERY 12 HOURS SCHEDULED
Status: DISCONTINUED | OUTPATIENT
Start: 2022-05-09 | End: 2022-05-09

## 2022-05-09 RX ORDER — ALBUMIN, HUMAN INJ 5% 5 %
25 SOLUTION INTRAVENOUS ONCE
Status: COMPLETED | OUTPATIENT
Start: 2022-05-09 | End: 2022-05-10

## 2022-05-09 RX ORDER — ALLOPURINOL 100 MG/1
200 TABLET ORAL DAILY
Status: DISCONTINUED | OUTPATIENT
Start: 2022-05-09 | End: 2022-05-14 | Stop reason: HOSPADM

## 2022-05-09 RX ORDER — ALLOPURINOL 300 MG/1
300 TABLET ORAL DAILY
COMMUNITY

## 2022-05-09 RX ORDER — ALBUMIN, HUMAN INJ 5% 5 %
12.5 SOLUTION INTRAVENOUS ONCE
Status: DISCONTINUED | OUTPATIENT
Start: 2022-05-09 | End: 2022-05-09

## 2022-05-09 RX ORDER — ACYCLOVIR 200 MG/1
400 CAPSULE ORAL
Status: DISCONTINUED | OUTPATIENT
Start: 2022-05-09 | End: 2022-05-14 | Stop reason: HOSPADM

## 2022-05-09 RX ORDER — MORPHINE SULFATE 100 MG/5ML
10 SOLUTION, CONCENTRATE ORAL EVERY 6 HOURS PRN
Status: DISCONTINUED | OUTPATIENT
Start: 2022-05-09 | End: 2022-05-09

## 2022-05-09 RX ORDER — FLUTICASONE PROPIONATE 50 MCG
2 SPRAY, SUSPENSION (ML) NASAL DAILY
Status: DISCONTINUED | OUTPATIENT
Start: 2022-05-09 | End: 2022-05-14 | Stop reason: HOSPADM

## 2022-05-09 RX ORDER — SENNOSIDES 8.6 MG
45 TABLET ORAL DAILY
Status: DISCONTINUED | OUTPATIENT
Start: 2022-05-09 | End: 2022-05-09

## 2022-05-09 RX ORDER — SENNOSIDES 8.6 MG
1 TABLET ORAL
Status: DISCONTINUED | OUTPATIENT
Start: 2022-05-09 | End: 2022-05-14 | Stop reason: HOSPADM

## 2022-05-09 RX ORDER — MORPHINE SULFATE 100 MG/5ML
5 SOLUTION, CONCENTRATE ORAL EVERY 6 HOURS PRN
Status: DISCONTINUED | OUTPATIENT
Start: 2022-05-09 | End: 2022-05-14 | Stop reason: HOSPADM

## 2022-05-09 RX ORDER — ACETAMINOPHEN 325 MG/1
650 TABLET ORAL EVERY 6 HOURS PRN
Status: DISCONTINUED | OUTPATIENT
Start: 2022-05-09 | End: 2022-05-14 | Stop reason: HOSPADM

## 2022-05-09 RX ADMIN — ADENOSINE 6 MG: 3 INJECTION, SOLUTION INTRAVENOUS at 06:35

## 2022-05-09 RX ADMIN — METOPROLOL TARTRATE 5 MG: 1 INJECTION, SOLUTION INTRAVENOUS at 17:38

## 2022-05-09 RX ADMIN — ACYCLOVIR 400 MG: 200 CAPSULE ORAL at 21:13

## 2022-05-09 RX ADMIN — ADENOSINE 6 MG: 3 INJECTION, SOLUTION INTRAVENOUS at 06:33

## 2022-05-09 RX ADMIN — CEFEPIME HYDROCHLORIDE 2000 MG: 2 INJECTION, POWDER, FOR SOLUTION INTRAVENOUS at 21:14

## 2022-05-09 RX ADMIN — IPRATROPIUM BROMIDE 0.25 MG: 0.5 SOLUTION RESPIRATORY (INHALATION) at 10:30

## 2022-05-09 RX ADMIN — IPRATROPIUM BROMIDE 0.25 MG: 0.5 SOLUTION RESPIRATORY (INHALATION) at 19:40

## 2022-05-09 RX ADMIN — SODIUM CHLORIDE 500 ML: 0.9 INJECTION, SOLUTION INTRAVENOUS at 08:43

## 2022-05-09 RX ADMIN — SODIUM CHLORIDE 1000 ML: 0.9 INJECTION, SOLUTION INTRAVENOUS at 09:26

## 2022-05-09 RX ADMIN — SODIUM CHLORIDE 1000 ML: 0.9 INJECTION, SOLUTION INTRAVENOUS at 10:43

## 2022-05-09 RX ADMIN — SODIUM CHLORIDE, PRESERVATIVE FREE 0.04 MG: 5 INJECTION INTRAVENOUS at 16:55

## 2022-05-09 RX ADMIN — SODIUM CHLORIDE 100 ML/HR: 0.9 INJECTION, SOLUTION INTRAVENOUS at 21:46

## 2022-05-09 RX ADMIN — ALBUMIN (HUMAN) 25 G: 12.5 INJECTION, SOLUTION INTRAVENOUS at 21:41

## 2022-05-09 RX ADMIN — METOPROLOL TARTRATE 5 MG: 1 INJECTION, SOLUTION INTRAVENOUS at 06:53

## 2022-05-09 RX ADMIN — SENNOSIDES 8.6 MG: 8.6 TABLET ORAL at 21:12

## 2022-05-09 RX ADMIN — ACETAMINOPHEN 650 MG: 650 SUPPOSITORY RECTAL at 05:54

## 2022-05-09 RX ADMIN — SODIUM CHLORIDE 125 ML/HR: 0.9 INJECTION, SOLUTION INTRAVENOUS at 10:08

## 2022-05-09 RX ADMIN — INSULIN LISPRO 1 UNITS: 100 INJECTION, SOLUTION INTRAVENOUS; SUBCUTANEOUS at 16:16

## 2022-05-09 RX ADMIN — DILTIAZEM HYDROCHLORIDE 5 MG/HR: 5 INJECTION, SOLUTION INTRAVENOUS at 07:35

## 2022-05-09 RX ADMIN — CEFEPIME HYDROCHLORIDE 2000 MG: 2 INJECTION, POWDER, FOR SOLUTION INTRAVENOUS at 10:29

## 2022-05-09 RX ADMIN — LEVALBUTEROL HYDROCHLORIDE 0.63 MG: 0.63 SOLUTION RESPIRATORY (INHALATION) at 10:42

## 2022-05-09 RX ADMIN — METOPROLOL TARTRATE 5 MG: 1 INJECTION, SOLUTION INTRAVENOUS at 06:43

## 2022-05-09 RX ADMIN — ACYCLOVIR 400 MG: 200 CAPSULE ORAL at 17:38

## 2022-05-09 RX ADMIN — CEFTRIAXONE SODIUM 1000 MG: 10 INJECTION, POWDER, FOR SOLUTION INTRAVENOUS at 06:18

## 2022-05-09 RX ADMIN — SODIUM CHLORIDE 2000 ML: 0.9 INJECTION, SOLUTION INTRAVENOUS at 06:03

## 2022-05-09 RX ADMIN — MORPHINE SULFATE 5 MG: 100 SOLUTION ORAL at 17:57

## 2022-05-09 RX ADMIN — INSULIN LISPRO 1 UNITS: 100 INJECTION, SOLUTION INTRAVENOUS; SUBCUTANEOUS at 21:14

## 2022-05-09 RX ADMIN — ADENOSINE 6 MG: 3 INJECTION INTRAVENOUS at 06:33

## 2022-05-09 RX ADMIN — GABAPENTIN 100 MG: 100 CAPSULE ORAL at 17:38

## 2022-05-09 NOTE — ASSESSMENT & PLAN NOTE
Fever 101 and tachycardia  Lactic acidosis of 3 8  procal 9 6  Chronic costello  Possible uti  Costello was changed in the ed  Given ceftriaxone  cxr reveals questionable left lower opacity  Repeat cxr in 24 hours consisting of pa and lateral  Will follow up on urine cultures and blood culture  Check cbc and procal in am    Continue iv fluids and trend lactic acid   Repeat lactic was 3 2

## 2022-05-09 NOTE — ED NOTES
Report called to [facility] [address], [facility]  05/09/22 6761

## 2022-05-09 NOTE — ASSESSMENT & PLAN NOTE
Due to copd  Uses 3 liters of oxygen continuously   No exacerbation   Continue breo and bronchodilators

## 2022-05-09 NOTE — H&P
[address] 1955, 78 y o  female MRN: [id number]  Unit/Bed#: ED 23 Encounter: [id number]  Primary Care Provider: No primary care provider on file  Date and time admitted to hospital: 5/9/2022  5:42 AM    * Sepsis [facility])  Assessment & Plan  Fever 101 and tachycardia  Lactic acidosis of 3 8  procal 9 6  Chronic costello  Possible uti  Costello was changed in the ed  Given ceftriaxone  cxr reveals questionable left lower opacity  Repeat cxr in 24 hours consisting of pa and lateral  Will follow up on urine cultures and blood culture  Check cbc and procal in am    Continue iv fluids and trend lactic acid  Repeat lactic was 3 2     Type 2 diabetes mellitus (HCC)  Assessment & Plan  Lab Results   Component Value Date    HGBA1C 6 8 (H) 01/19/2022       No results for input(s): POCGLU in the last 72 hours  Blood Sugar Average: Last 72 hrs:     Hold metformin  Will start insulin sliding scale  Chronic respiratory failure (HCC)  Assessment & Plan  Due to copd  Uses 3 liters of oxygen continuously   No exacerbation  Continue breo and bronchodilators    SVT (supraventricular tachycardia) ([facility])  Assessment & Plan  Heart rate of 180s  Given adenosine of 12mg  Metoprolol 10mg and started on cardizem gtt which has been d/yasemin  Now sinus tach of 108  Will monitor on tele  Will continue metoprolol po if sbp allows  Pt is on 100mg bid  May need to reduce dose to 50mg bid  Cycle out troponins  Repeat ekg       MIK (acute kidney injury) ([facility] [address] )  Assessment & Plan  Baseline is 0 6-0 8  Creatinine on admit is  1 2  Will continue iv fluids  Check bmp in am  Hold nephrotoxic medications     Ambulatory dysfunction  Assessment & Plan  Pt from [facility]  Consult pt/ot    Thrombocytopenia ([facility] [address] )  Assessment & Plan  Due to multiple myeloma  Platelets stable in the 30s    GERD (gastroesophageal reflux disease)  Assessment & Plan  Continue ppi     Multiple myeloma ([facility] [address] )  Assessment & Plan  With thrombocytopenia and anemia  Follows with oncology outpt    VTE Prophylaxis: Pharmacologic VTE Prophylaxis contraindicated due to due to thrombocytopenia  / sequential compression device   Code Status: full code    Anticipated Length of Stay:  Patient will be admitted on an Inpatient basis with an anticipated length of stay of  Greater than 2 midnights  Justification for Hospital Stay: pt will require step down level 2 due to sepsis with svt    Chief Complaint:   Fever and tachycardia    History of Present Illness:    Joseluis Diaz is a 78 y o  female with pmhx of type 2 diabetes, multiple myeloma, chronic costello catheter, chronic respiratory failure due to copd requiring 3 liters of oxygen presents from [facility] due to fever and elevated heart rate  In the Ed she was found to have a temp of 101 and heart rate in the 150s  There was concern for uti in which she was given ceftriaxone and costello was exchanged  Pt then had a heart rate of 180s and was in svt  She was given adenosine of 12mg, metoprolol 10mg and started on cardizem gtt  Pt is currently in sinus tach at 107  She denies any shortness breath, cough, sore throat, n/v/d or abdominal pain  Review of Systems:    Review of Systems   Constitutional: Positive for fever  Negative for chills  HENT: Negative  Eyes: Negative  Respiratory: Negative  Negative for shortness of breath  Cardiovascular: Negative  Negative for chest pain  Gastrointestinal: Negative  Endocrine: Negative  Genitourinary: Negative  Musculoskeletal: Negative  Skin: Negative  Allergic/Immunologic: Negative  Neurological: Negative  Hematological: Negative  Psychiatric/Behavioral: Negative          Past Medical and Surgical History:     Past Medical History:   Diagnosis Date    Anemia     Asthma     Chronic constipation     Chronic pain     COPD (chronic obstructive pulmonary disease) (HCC)     CTS (carpal tunnel syndrome)     DJD (degenerative joint disease)     GERD (gastroesophageal reflux disease)     Hypertension     Multiple myeloma (HCC)     SAMIR (obstructive sleep apnea)     Osteonecrosis (HCC)     Sciatica     Thoracic aortic aneurysm (HCC)        Past Surgical History:   Procedure Laterality Date    BONE MARROW BIOPSY      COLONOSCOPY      PORTACATH PLACEMENT      REDUCTION MAMMAPLASTY      VERTEBROPLASTY         Meds/Allergies:    Prior to Admission medications    Medication Sig Start Date End Date Taking?  Authorizing Provider   allopurinol (ZYLOPRIM) 300 mg tablet Take 300 mg by mouth daily   Yes Historical Provider, MD   bisacodyl (DULCOLAX) 10 mg suppository Insert 10 mg into the rectum daily   Yes Historical Provider, MD   metFORMIN (GLUCOPHAGE) 500 mg tablet Take 500 mg by mouth 2 (two) times a day with meals   Yes Historical Provider, MD   Miconazole POWD Use   Yes Historical Provider, MD   ondansetron (ZOFRAN) 8 mg tablet Take by mouth every 8 (eight) hours as needed for nausea or vomiting   Yes Historical Provider, MD   acetaminophen (TYLENOL) 500 mg tablet Take 500 mg by mouth every 6 (six) hours as needed for mild pain    Historical Provider, MD   acyclovir (ZOVIRAX) 400 MG tablet Take 400 mg by mouth every 4 (four) hours while awake    Historical Provider, MD   albuterol (PROAIR HFA) 90 mcg/act inhaler Inhale 2 puffs every 6 (six) hours as needed for wheezing 1/17/19   Edelmira Maurice DO   ascorbic acid (VITAMIN C) 500 mg tablet Take 500 mg by mouth daily    Historical Provider, MD   aspirin 81 mg chewable tablet Chew 81 mg daily    Historical Provider, MD   Bioflavonoid Products (BIOFLEX PO) Take 1 tablet by mouth daily    Historical Provider, MD   BIOTIN PO Take 5,000 mcg by mouth 2 (two) times a day    Historical Provider, MD   Calcium Carb-Cholecalciferol (OS-ANTONIETA PO) Take 1 tablet by mouth daily    Historical Provider, MD   CALCIUM CARBONATE-VIT D-MIN PO Take 1 tablet by mouth daily    Historical Provider, MD   chlorhexidine (PERIDEX) 0 12 % solution Apply 15 mL to the mouth or throat 2 (two) times a day    Historical Provider, MD   Dexamethasone (DECADRON PO) Take by mouth    Historical Provider, MD   fluticasone (FLONASE) 50 mcg/act nasal spray 2 sprays into each nostril daily    Historical Provider, MD   fluticasone-vilanterol (BREO ELLIPTA) 100-25 mcg/inh inhaler Inhale 1 puff daily Rinse mouth after use  Historical Provider, MD   gabapentin (NEURONTIN) 300 mg capsule Take 100 mg by mouth 2 (two) times a day    Historical Provider, MD   ipratropium (ATROVENT) 0 02 % nebulizer solution Take 0 25 mg by nebulization 2 (two) times a day    Historical Provider, MD   LORazepam (ATIVAN) 0 5 mg tablet Take 0 5 mg by mouth every 6 (six) hours as needed for anxiety    Historical Provider, MD   metoprolol succinate (TOPROL-XL) 50 mg 24 hr tablet Take 1 tablet (50 mg total) by mouth daily  Patient taking differently: Take 100 mg by mouth 2 (two) times a day   1/18/19   Edelmira Maurice DO   morphine (MS CONTIN) 15 mg 12 hr tablet Take 15 mg by mouth every 4 (four) hours as needed for severe pain    Historical Provider, MD   Multiple Vitamin (MULTIVITAMIN) tablet Take 1 tablet by mouth daily    Historical Provider, MD   omeprazole (PriLOSEC) 20 mg delayed release capsule Take 20 mg by mouth daily    Historical Provider, MD   potassium chloride (K-DUR,KLOR-CON) 10 mEq tablet Take 20 mEq by mouth 2 (two) times a day  Patient not taking: Reported on 5/9/2022     Historical Provider, MD   prochlorperazine (COMPAZINE) 10 mg tablet Take 10 mg by mouth every 6 (six) hours as needed for nausea or vomiting  Patient not taking: Reported on 5/9/2022     Historical Provider, MD   SENNOSIDES PO Take 45 mg by mouth daily    Historical Provider, MD     I have reviewed home medications with patient personally  Allergies:    Allergies   Allergen Reactions    Aspirin     Lisinopril Angioedema    Nsaids      Avoid due to renal protection    Penicillins Social History:     Marital Status: /Civil Union     Substance Use History:   Social History     Substance and Sexual Activity   Alcohol Use No     Social History     Tobacco Use   Smoking Status Former Smoker   Smokeless Tobacco Never Used     Social History     Substance and Sexual Activity   Drug Use No       Family History:    non-contributory    Physical Exam:     Vitals:   Blood Pressure: (!) 103/44 (05/09/22 0800)  Pulse: (!) 111 (05/09/22 0830)  Temperature: 99 3 °F (37 4 °C) (05/09/22 0800)  Temp Source: Core (05/09/22 0640)  Respirations: 20 (05/09/22 0800)  Weight - Scale: 85 9 kg (189 lb 6 oz) (05/09/22 0544)  SpO2: 100 % (05/09/22 0800)    Physical Exam  Constitutional:       Appearance: Normal appearance  HENT:      Head: Normocephalic and atraumatic  Eyes:      Extraocular Movements: Extraocular movements intact  Pupils: Pupils are equal, round, and reactive to light  Cardiovascular:      Rate and Rhythm: Regular rhythm  Tachycardia present  Heart sounds: No murmur heard  No friction rub  No gallop  Pulmonary:      Effort: Pulmonary effort is normal  No respiratory distress  Breath sounds: Normal breath sounds  No wheezing or rales  Abdominal:      General: Bowel sounds are normal  There is no distension  Palpations: Abdomen is soft  Tenderness: There is no abdominal tenderness  There is no guarding  Musculoskeletal:      Right lower leg: No edema  Left lower leg: No edema  Neurological:      Mental Status: She is alert and oriented to person, place, and time  Additional Data:     Lab Results: I have personally reviewed pertinent reports        Results from last 7 days   Lab Units 05/09/22  0601   WBC Thousand/uL 9 14   HEMOGLOBIN g/dL 9 8*   HEMATOCRIT % 30 1*   PLATELETS Thousands/uL 35*   LYMPHO PCT % 3*   MONO PCT % 8   EOS PCT % 0     Results from last 7 days   Lab Units 05/09/22  0601   POTASSIUM mmol/L 3 6   CHLORIDE mmol/L 102 CO2 mmol/L 26   BUN mg/dL 12   CREATININE mg/dL 1 26   CALCIUM mg/dL 10 1   ALK PHOS U/L 154*   ALT U/L 33   AST U/L 34     Results from last 7 days   Lab Units 05/09/22  0601   INR  1 21*       Imaging: I have personally reviewed pertinent reports  XR chest 1 view portable    Result Date: 5/9/2022  Narrative: CHEST INDICATION:   fever, unknown source  COMPARISON:  None EXAM PERFORMED/VIEWS:  XR CHEST PORTABLE 1 image FINDINGS: Cardiomediastinal silhouette appears enlarged  The pulmonary vessels are normal  The right lung is clear  Questionable opacity at the left lung base  No pneumothorax or pleural effusion  Osseous structures appear within normal limits for patient age  Impression: Questionable opacity at the left lung base  Correlate with PA and lateral films or CT scan of the chest to exclude an infiltrate in this region  The study was marked in EPIC for significant notification  Workstation performed: [id number]       EKG, Pathology, and Other Studies Reviewed on Admission:   · EKG: multiple  sinus tach at 153  SVT at 184  Now sinus tach again at 138 with wandering baseline     [facility] / Care Everywhere Records Reviewed:  Yes

## 2022-05-09 NOTE — UTILIZATION REVIEW
Inpatient Admission Authorization Request   NOTIFICATION OF INPATIENT ADMISSION/INPATIENT AUTHORIZATION REQUEST   SERVICING FACILITY:   [address] [place] [address], [place] [address] [address] [facility]  Tax ID: [id number]  NPI: [id number]  Place of Service: Inpatient [facility]  [address]  Place of Service Code: 24     ATTENDING PROVIDER:  Attending Name and NPI#: Parish Serrato [[id number]]  Address: [address] [place] [address], [place] [address] [address] [facility]  Phone: [phone]     UTILIZATION REVIEW CONTACT:  Yousif Chambers, Utilization   Network Utilization Review Department  Phone: [phone]  Fax: [phone]  Email: [email or web address]     PHYSICIAN ADVISORY SERVICES:  FOR [id number] REVIEW - MEDICAL NECESSITY DENIAL  Phone: [phone]  Fax: [phone]  Email: [email or web address]     TYPE OF REQUEST:  Inpatient Status     ADMISSION INFORMATION:  ADMISSION DATE/TIME: 5/9/22  8:24 AM  PATIENT DIAGNOSIS CODE/DESCRIPTION:  Palpitations [R00 2]  UTI (urinary tract infection) [N39 0]  Tachycardia [R00 0]  Sepsis ([facility] [address] ) [A41 9]  DISCHARGE DATE/TIME: No discharge date for patient encounter  IMPORTANT INFORMATION:  Please contact the Yousif Chambers directly with any questions or concerns regarding this request  Department voicemails are confidential     Send requests for admission clinical reviews, concurrent reviews, approvals, and administrative denials due to lack of clinical to fax [phone]

## 2022-05-09 NOTE — QUICK NOTE
Notified that pts heart rate was 160  Will give IV metoprolol of 5mg x1  Will restart metoprolol at lower dose of 25mg and titrate to 100mg bid as sbp allows

## 2022-05-09 NOTE — ASSESSMENT & PLAN NOTE
Heart rate of 180s in ed in which pt was treated with adenosine of 12mg, Metoprolol IV 10mg and started on cardizem gtt which was d/yasemin as pts was sinus tach and heart rate improved  She is on Toprol 100mg bid outpt  This was originally held due to hypotension  She was restarted on 25mg this am  Heart rate currently 150 which appears sinus tach  Will give an extra dose of 5mg of metoprolol and continue 25mg bid  Will try to increase metoprolol today if bp allows  Pain vs withdrawal from pain medications could also be driving tachycardia  Will work on adjusting narcotic dose depending sbp  Also replace mg and potassium

## 2022-05-09 NOTE — QUICK NOTE
sbp 84/49  Received 3 5 liters  Will bolus one further liter  Will discuss with critical care if pt is still hypotensive

## 2022-05-09 NOTE — QUICK NOTE
Alerted by nursing staff that pts sbp is 78/40  Pt received 2 5 liters  Will bolus with additional liter  Repeat sbp  Change from ceftriaxone to cefepime  Pt had been on metoprolol outpt  Will d/c and resume when sbp is improved  Will discuss with critical care if no improvement

## 2022-05-09 NOTE — QUICK NOTE
QUICK NOTE - Deterioration Index  Romeo Alert 78 y o  female MRN: [id number]  Unit/Bed#: E4 -01 Encounter: [id number]    Date Paged: 22  Time Paged: 1633  Room #: 12  Arrival Time: 1648  Deterioration index score at time of page: 62 6  %  Spoke with patient's primary RN  Need to escalate level of care: no     PROBLEMS resulting in high DI score:   19% Chauncey coma scale 13   18% Supplemental oxygen Nasal cannula   18% Age 78   15% Neurological exam Lethargic   8% Pulse 113   6% Cardiac rhythm Sinus tachycardia   6% Systolic 98   4% Respiratory rate 18     Contribution Factor Value   2% WBC count 9 14 Thousand/uL   1% Hematocrit 30 1 %   1% Sodium 138 mmol/L   1% Pulse oximetry 99 %   1% Potassium 3 6 mmol/L   1% Platelet count 35 Thousands/uL   <1% Temperature 98 3 °F (36 8 °C)       PLAN:     Patient was lethargic, received narcan prior to my arrival w/ improvement in her mentation   Continue to monitor in current setting    Please contact critical care via [employer] with any questions or concerns       Vitals:   Vitals:    22 1032 22 1100 22 1200 22 1559   BP: (!) 84/49 121/57 96/51 (!) 98/49   BP Location: Right arm Right arm Right arm Left arm   Pulse: 98 (!) 112 104 (!) 113   Resp: 20 20 20 18   Temp:  97 5 °F (36 4 °C) 97 9 °F (36 6 °C) 98 3 °F (36 8 °C)   TempSrc:    Temporal   SpO2: 100% 98% 100% 99%   Weight:           Respiratory:  SpO2: SpO2: 99 %, SpO2 Activity: SpO2 Activity: At Rest, SpO2 Device: O2 Device: Nasal cannula  Nasal Cannula O2 Flow Rate (L/min): 4 L/min    Temperature: Temp (24hrs), Av °F (37 2 °C), Min:97 5 °F (36 4 °C), Max:101 2 °F (38 4 °C)  Current: Temperature: 98 3 °F (36 8 °C)    Labs:   Results from last 7 days   Lab Units 22  0601   WBC Thousand/uL 9 14   HEMOGLOBIN g/dL 9 8*   HEMATOCRIT % 30 1*   PLATELETS Thousands/uL 35*   MONO PCT % 8     Results from last 7 days   Lab Units 22  0601   SODIUM mmol/L 138   POTASSIUM mmol/L 3 6   CHLORIDE mmol/L 102   CO2 mmol/L 26   BUN mg/dL 12   CREATININE mg/dL 1 26   CALCIUM mg/dL 10 1   ALK PHOS U/L 154*   ALT U/L 33   AST U/L 34     Results from last 7 days   Lab Units 05/09/22  0601   MAGNESIUM mg/dL 0 8*     Results from last 7 days   Lab Units 05/09/22  1330 05/09/22  1146 05/09/22  0947 05/09/22  0752 05/09/22  0601   LACTIC ACID mmol/L 1 4 3 0* 2 4* 3 2* 3 8*         Results from last 7 days   Lab Units 05/09/22  0601   PROCALCITONIN ng/ml 9 60*       Code Status: Level 1 - Full Code

## 2022-05-09 NOTE — QUICK NOTE
Called as pt is now more lethargic  Given 0 04 of narcan and pt woke up  She new she was in the hospital but unsure of month or year  Ct head still pending  However likely acute encephalopathy due to sepsis and narcotic use

## 2022-05-09 NOTE — ED NOTES
Repeat EKG sent to [facility] through [facility]        Abril Campos RN  05/09/22 9244

## 2022-05-09 NOTE — QUICK NOTE
Alerted by nurse that pt is oriented to only self and confused  She has a history of pres  Likely related to sepsis  Will obtain ct head  Will start neurochecks and reorient pt

## 2022-05-09 NOTE — ED PROVIDER NOTES
History  Chief Complaint   Patient presents with    Rapid Heart Rate     Pt arrives from [facility] via ems  Per ems pt heart rate in 150s upon ems arrival       This is a 34-year-old female with a history of multiple myeloma with recurrence, urinary retention with Lee catheter, diabetes, anemia of chronic disease, thrombocytopenia who presents from rehab with tachycardia  Patient not answering questions on arrival   She is in sinus rhythm around 150  She is febrile via rectal temperature  Ele catheter is in place  On review of records,  patient was admitted to [facility] on 4/22/22 for hypercalcemia, MIK, acute metabolic encephalopathy  She was found to have recurrence of her multiple myeloma  She was deemed not to be a candidate for further treatment  Patient's MIK hypercalcemia resolved with pamidronate and IV fluids  Patient continued to be encephalopathic  Family decided that she would be a candidate for hospice  However, the patient's encephalopathy gradually resolved and she wanted to continue treatment  She was discharged to rehab  Prior to Admission Medications   Prescriptions Last Dose Informant Patient Reported? Taking?    BIOTIN PO   Yes No   Sig: Take 5,000 mcg by mouth 2 (two) times a day   Bioflavonoid Products (BIOFLEX PO)   Yes No   Sig: Take 1 tablet by mouth daily   CALCIUM CARBONATE-VIT D-MIN PO   Yes No   Sig: Take 1 tablet by mouth daily   Calcium Carb-Cholecalciferol (OS-ANTONIETA PO)   Yes No   Sig: Take 1 tablet by mouth daily   Dexamethasone (DECADRON PO)   Yes No   Sig: Take by mouth   LORazepam (ATIVAN) 0 5 mg tablet   Yes No   Sig: Take 0 5 mg by mouth every 6 (six) hours as needed for anxiety   Miconazole POWD   Yes Yes   Sig: Use   Multiple Vitamin (MULTIVITAMIN) tablet   Yes No   Sig: Take 1 tablet by mouth daily   SENNOSIDES PO   Yes No   Sig: Take 45 mg by mouth daily   acetaminophen (TYLENOL) 500 mg tablet   Yes No   Sig: Take 500 mg by mouth every 6 (six) hours as needed for mild pain   acyclovir (ZOVIRAX) 400 MG tablet   Yes No   Sig: Take 400 mg by mouth every 4 (four) hours while awake   albuterol (PROAIR HFA) 90 mcg/act inhaler   No No   Sig: Inhale 2 puffs every 6 (six) hours as needed for wheezing   allopurinol (ZYLOPRIM) 300 mg tablet   Yes Yes   Sig: Take 300 mg by mouth daily   ascorbic acid (VITAMIN C) 500 mg tablet   Yes No   Sig: Take 500 mg by mouth daily   aspirin 81 mg chewable tablet   Yes No   Sig: Chew 81 mg daily   bisacodyl (DULCOLAX) 10 mg suppository   Yes Yes   Sig: Insert 10 mg into the rectum daily   chlorhexidine (PERIDEX) 0 12 % solution   Yes No   Sig: Apply 15 mL to the mouth or throat 2 (two) times a day   fluticasone (FLONASE) 50 mcg/act nasal spray   Yes No   Si sprays into each nostril daily   fluticasone-vilanterol (BREO ELLIPTA) 100-25 mcg/inh inhaler   Yes No   Sig: Inhale 1 puff daily Rinse mouth after use    gabapentin (NEURONTIN) 300 mg capsule   Yes No   Sig: Take 100 mg by mouth 2 (two) times a day   ipratropium (ATROVENT) 0 02 % nebulizer solution   Yes No   Sig: Take 0 25 mg by nebulization 2 (two) times a day   metFORMIN (GLUCOPHAGE) 500 mg tablet   Yes Yes   Sig: Take 500 mg by mouth 2 (two) times a day with meals   metoprolol succinate (TOPROL-XL) 50 mg 24 hr tablet   No No   Sig: Take 1 tablet (50 mg total) by mouth daily   Patient taking differently: Take 100 mg by mouth 2 (two) times a day     morphine (MS CONTIN) 15 mg 12 hr tablet   Yes No   Sig: Take 15 mg by mouth every 4 (four) hours as needed for severe pain   omeprazole (PriLOSEC) 20 mg delayed release capsule   Yes No   Sig: Take 20 mg by mouth daily   ondansetron (ZOFRAN) 8 mg tablet   Yes Yes   Sig: Take by mouth every 8 (eight) hours as needed for nausea or vomiting   potassium chloride (K-DUR,KLOR-CON) 10 mEq tablet Not Taking at Unknown time  Yes No   Sig: Take 20 mEq by mouth 2 (two) times a day   Patient not taking: Reported on 2022 prochlorperazine (COMPAZINE) 10 mg tablet Not Taking at Unknown time  Yes No   Sig: Take 10 mg by mouth every 6 (six) hours as needed for nausea or vomiting   Patient not taking: Reported on 5/9/2022       Facility-Administered Medications: None       Past Medical History:   Diagnosis Date    Anemia     Asthma     Chronic constipation     Chronic pain     COPD (chronic obstructive pulmonary disease) (HCC)     CTS (carpal tunnel syndrome)     DJD (degenerative joint disease)     GERD (gastroesophageal reflux disease)     Hypertension     Multiple myeloma (HCC)     SAMIR (obstructive sleep apnea)     Osteonecrosis ([address] )     Sciatica     Thoracic aortic aneurysm (HCC)        Past Surgical History:   Procedure Laterality Date    BONE MARROW BIOPSY      COLONOSCOPY      PORTACATH PLACEMENT      REDUCTION MAMMAPLASTY      VERTEBROPLASTY         History reviewed  No pertinent family history  I have reviewed and agree with the history as documented  E-Cigarette/Vaping     E-Cigarette/Vaping Substances     Social History     Tobacco Use    Smoking status: Former Smoker    Smokeless tobacco: Never Used   Substance Use Topics    Alcohol use: No    Drug use: No       Review of Systems   Unable to perform ROS: Mental status change       Physical Exam  Physical Exam  Constitutional:       General: She is not in acute distress  Comments: Febrile, chronically ill-appearing  HENT:      Mouth/Throat:      Mouth: Mucous membranes are dry  Pharynx: Uvula midline  Eyes:      Conjunctiva/sclera: Conjunctivae normal       Pupils: Pupils are equal, round, and reactive to light  Neck:      Thyroid: No thyroid mass or thyromegaly  Trachea: Trachea normal    Cardiovascular:      Rate and Rhythm: Regular rhythm  Tachycardia present  Heart sounds: Normal heart sounds  No murmur heard  Pulmonary:      Effort: Pulmonary effort is normal       Breath sounds: Normal breath sounds  Abdominal:      General: Bowel sounds are normal       Palpations: Abdomen is soft  Tenderness: There is no abdominal tenderness  There is no guarding or rebound  Genitourinary:     Comments: Lee catheter in place draining cloudy urine  Musculoskeletal:      Comments: Sacral decubitus ulcer  No open wound  No evidence of infection  Skin:     General: Skin is warm and dry  Neurological:      Mental Status: She is alert           Vital Signs  ED Triage Vitals   Temperature Pulse Respirations Blood Pressure SpO2   05/09/22 0544 05/09/22 0544 05/09/22 0544 05/09/22 0544 05/09/22 0544   (!) 101 2 °F (38 4 °C) (!) 153 (!) 32 101/62 99 %      Temp Source Heart Rate Source Patient Position - Orthostatic VS BP Location FiO2 (%)   05/09/22 0544 05/09/22 0544 05/09/22 0715 05/09/22 0544 --   Rectal Monitor Lying Right arm       Pain Score       05/09/22 0658       No Pain           Vitals:    05/09/22 1559 05/09/22 1700 05/09/22 1807 05/09/22 2003   BP: (!) 98/49 129/71 124/58 (!) 90/42   Pulse: (!) 113 (!) 169 (!) 111 (!) 109   Patient Position - Orthostatic VS: Lying Lying Lying Lying         Visual Acuity  Visual Acuity      Most Recent Value   L Pupil Size (mm) 3   R Pupil Size (mm) 3   L Pupil Shape Round   R Pupil Shape Round          ED Medications  Medications   allopurinol (ZYLOPRIM) tablet 200 mg (0 mg Oral Hold 5/9/22 0954)   ascorbic acid (VITAMIN C) tablet 500 mg (0 mg Oral Hold 5/9/22 0955)   fluticasone (FLONASE) 50 mcg/act nasal spray 2 spray (0 sprays Nasal Hold 5/9/22 0955)   fluticasone-vilanterol (BREO ELLIPTA) 100-25 mcg/inh inhaler 1 puff (0 puffs Inhalation Hold 5/9/22 1008)   gabapentin (NEURONTIN) capsule 100 mg (100 mg Oral Given 5/9/22 1738)   ipratropium (ATROVENT) 0 02 % inhalation solution 0 25 mg (0 25 mg Nebulization Given 5/9/22 1940)   LORazepam (ATIVAN) tablet 0 5 mg (has no administration in time range)   multivitamin stress formula tablet 1 tablet (0 tablets Oral Hold 5/9/22 1008)   pantoprazole (PROTONIX) EC tablet 20 mg (0 mg Oral Hold 5/9/22 0956)   potassium chloride (K-DUR,KLOR-CON) CR tablet 20 mEq (0 mEq Oral Hold 5/9/22 0956)   sodium chloride 0 9 % infusion (100 mL/hr Intravenous Rate/Dose Change 5/9/22 1427)   acetaminophen (TYLENOL) tablet 650 mg (has no administration in time range)   ondansetron (ZOFRAN) injection 4 mg (has no administration in time range)   insulin lispro (HumaLOG) 100 units/mL subcutaneous injection 1-5 Units (1 Units Subcutaneous Given 5/9/22 1616)   insulin lispro (HumaLOG) 100 units/mL subcutaneous injection 1-5 Units (has no administration in time range)   levalbuterol (XOPENEX) inhalation solution 0 63 mg (0 63 mg Nebulization Given 5/9/22 1042)   cefepime (MAXIPIME) 2 g/50 mL dextrose IVPB (0 mg Intravenous Stopped 5/9/22 1140)   senna (SENOKOT) tablet 8 6 mg (has no administration in time range)   acyclovir (ZOVIRAX) capsule 400 mg (400 mg Oral Given 5/9/22 1738)   naloxone (NARCAN) 0 04 mg/mL syringe 0 04 mg (has no administration in time range)   metoprolol tartrate (LOPRESSOR) tablet 25 mg (0 mg Oral Hold 5/9/22 2050)   Morphine Sulfate (Concentrate) oral concentrated solution 5 mg (5 mg Oral Given 5/9/22 1757)   albumin human (FLEXBUMIN) 5 % injection 25 g (has no administration in time range)   acetaminophen (TYLENOL) rectal suppository 650 mg (650 mg Rectal Given 5/9/22 0554)   sodium chloride 0 9 % bolus 2,000 mL (0 mL Intravenous Stopped 5/9/22 0717)   ceftriaxone (ROCEPHIN) 1 g/50 mL in dextrose IVPB (0 mg Intravenous Stopped 5/9/22 0654)   adenosine (ADENOCARD) injection 6 mg (6 mg Intravenous Given 5/9/22 0633)   adenosine (ADENOCARD) injection 6 mg (6 mg Intravenous Given 5/9/22 0635)   metoprolol (LOPRESSOR) injection 5 mg (5 mg Intravenous Given 5/9/22 0643)   metoprolol (LOPRESSOR) injection 5 mg (5 mg Intravenous Given 5/9/22 0653)   diltiazem (CARDIZEM) 125 mg in sodium chloride 0 9 % 125 mL infusion (0 mg/hr Intravenous Stopped 5/9/22 0816)   sodium chloride 0 9 % bolus 500 mL (0 mL Intravenous Stopped 5/9/22 0945)   sodium chloride 0 9 % bolus 1,000 mL (0 mL Intravenous Stopped 5/9/22 1140)   sodium chloride 0 9 % bolus 1,000 mL (0 mL Intravenous Stopped 5/9/22 1146)   naloxone (NARCAN) 0 04 mg/mL syringe 0 04 mg (0 04 mg Intravenous Given 5/9/22 1655)   metoprolol (LOPRESSOR) injection 5 mg (5 mg Intravenous Given 5/9/22 1738)       Diagnostic Studies  Results Reviewed     Procedure Component Value Units Date/Time    HS Troponin I 4hr [[id number]]  (Normal) Collected: 05/09/22 1146    Lab Status: Final result Specimen: Blood from Arm, Left Updated: 05/09/22 1235     hs TnI 4hr 17 ng/L      Delta 4hr hsTnI -5 ng/L     Lactic acid 2 Hours [[id number]]  (Abnormal) Collected: 05/09/22 1146    Lab Status: Final result Specimen: Blood from Arm, Left Updated: 05/09/22 1233     LACTIC ACID 3 0 mmol/L     Narrative:      Result may be elevated if tourniquet was used during collection  Fingerstick Glucose (POCT) [[id number]]  (Abnormal) Collected: 05/09/22 1145    Lab Status: Final result Updated: 05/09/22 1150     POC Glucose 141 mg/dl     Blood culture #1 [[id number]] Collected: 05/09/22 0616    Lab Status: Preliminary result Specimen: Blood from Hand, Right Updated: 05/09/22 1101     Blood Culture Received in Microbiology Lab  Culture in Progress  Blood culture #2 [[id number]] Collected: 05/09/22 1289    Lab Status: Preliminary result Specimen: Blood from Arm, Right Updated: 05/09/22 1101     Blood Culture Received in Microbiology Lab  Culture in Progress      Magnesium [[id number]]  (Abnormal) Collected: 05/09/22 0601    Lab Status: Final result Specimen: Blood from Arm, Left Updated: 05/09/22 1046     Magnesium 0 8 mg/dL     Lactic acid, plasma [[id number]]  (Abnormal) Collected: 05/09/22 0947    Lab Status: Final result Specimen: Blood from Arm, Left Updated: 05/09/22 1040     LACTIC ACID 2 4 mmol/L     Narrative:      Result may be elevated if tourniquet was used during collection  HS Troponin I 2hr [[id number]]  (Normal) Collected: 05/09/22 0947    Lab Status: Final result Specimen: Blood from Arm, Left Updated: 05/09/22 1030     hs TnI 2hr 16 ng/L      Delta 2hr hsTnI -6 ng/L     Manual Differential(PHLEBS Do Not Order) [[id number]]  (Abnormal) Collected: 05/09/22 0601    Lab Status: Final result Specimen: Blood from Arm, Left Updated: 05/09/22 0853     Segmented % 85 %      Bands % 2 %      Lymphocytes % 3 %      Monocytes % 8 %      Eosinophils, % 0 %      Basophils % 0 %      Myelocytes % 1 %      Atypical Lymphocytes % 1 %      Absolute Neutrophils 7 95 Thousand/uL      Lymphocytes Absolute 0 27 Thousand/uL      Monocytes Absolute 0 73 Thousand/uL      Eosinophils Absolute 0 00 Thousand/uL      Basophils Absolute 0 00 Thousand/uL      Total Counted --     Microcytes Present     Platelet Estimate Decreased    Lactic acid 2 Hours [[id number]]  (Abnormal) Collected: 05/09/22 0752    Lab Status: Final result Specimen: Blood from Arm, Left Updated: 05/09/22 0845     LACTIC ACID 3 2 mmol/L     Narrative:      Result may be elevated if tourniquet was used during collection  HS Troponin 0hr (reflex protocol) [[id number]]  (Normal) Collected: 05/09/22 0752    Lab Status: Final result Specimen: Blood from Arm, Left Updated: 05/09/22 0824     hs TnI 0hr 22 ng/L     Urine Microscopic [[id number]]  (Abnormal) Collected: 05/09/22 0615    Lab Status: Final result Specimen: Urine, Straight Cath Updated: 05/09/22 0736     RBC, UA 4-10 /hpf      WBC, UA 20-30 /hpf      Epithelial Cells Occasional /hpf      Bacteria, UA Innumerable /hpf      Fine granular casts 2-3 /lpf     Urine culture [[id number]] Collected: 05/09/22 0615    Lab Status:  In process Specimen: Urine, Straight Cath Updated: 05/09/22 0735    COVID/FLU/RSV - 2 hour TAT [[id number]]  (Normal) Collected: 05/09/22 0601    Lab Status: Final result Specimen: Nares from Nasopharyngeal Swab Updated: 05/09/22 0651     SARS-CoV-2 Negative     INFLUENZA A PCR Negative     INFLUENZA B PCR Negative     RSV PCR Negative    Narrative:      FOR PEDIATRIC PATIENTS - copy/paste COVID Guidelines URL to browser: [email or web address]    SARS-CoV-2 assay is a Nucleic Acid Amplification assay intended for the  qualitative detection of nucleic acid from SARS-CoV-2 in nasopharyngeal  swabs  Results are for the presumptive identification of SARS-CoV-2 RNA  Positive results are indicative of infection with SARS-CoV-2, the virus  causing COVID-19, but do not rule out bacterial infection or co-infection  with other viruses  Laboratories within the [place] and its  territories are required to report all positive results to the appropriate  public health authorities  Negative results do not preclude SARS-CoV-2  infection and should not be used as the sole basis for treatment or other  patient management decisions  Negative results must be combined with  clinical observations, patient history, and epidemiological information  This test has not been FDA cleared or approved  This test has been authorized by FDA under an Emergency Use Authorization  (EUA)  This test is only authorized for the duration of time the  declaration that circumstances exist justifying the authorization of the  emergency use of an in vitro diagnostic tests for detection of SARS-CoV-2  virus and/or diagnosis of COVID-19 infection under section 564(b)(1) of  the Act, 21 U  S C  360UKB-8(M)(9), unless the authorization is terminated  or revoked sooner  The test has been validated but independent review by FDA  and CLIA is pending  Test performed using [employer] Gene[employer]pert: This RT-PCR assay targets N2,  a region unique to SARS-CoV-2  A conserved region in the E-gene was chosen  for pan-Sarbecovirus detection which includes SARS-CoV-2      Lactic Acid [[id number]]  (Abnormal) Collected: 05/09/22 0601 Lab Status: Final result Specimen: Blood from Arm, Left Updated: 05/09/22 9504     LACTIC ACID 3 8 mmol/L     Narrative:      Result may be elevated if tourniquet was used during collection      Procalcitonin [[id number]]  (Abnormal) Collected: 05/09/22 0601    Lab Status: Final result Specimen: Blood from Arm, Left Updated: 05/09/22 2311     Procalcitonin 9 60 ng/ml     UA w Reflex to Microscopic w Reflex to Culture [[id number]]  (Abnormal) Collected: 05/09/22 0615    Lab Status: Final result Specimen: Urine, Straight Cath Updated: 05/09/22 0634     Color, UA Yellow     Clarity, UA Slightly Cloudy     Specific Gravity, UA >=1 030     pH, UA 6 0     Leukocytes, UA Small     Nitrite, UA Positive     Protein,  (2+) mg/dl      Glucose,  (1/10%) mg/dl      Ketones, UA Trace mg/dl      Urobilinogen, UA 0 2 E U /dl      Bilirubin, UA Negative     Blood, UA Moderate    Comprehensive metabolic panel [[id number]]  (Abnormal) Collected: 05/09/22 0601    Lab Status: Final result Specimen: Blood from Arm, Left Updated: 05/09/22 2272     Sodium 138 mmol/L      Potassium 3 6 mmol/L      Chloride 102 mmol/L      CO2 26 mmol/L      ANION GAP 10 mmol/L      BUN 12 mg/dL      Creatinine 1 26 mg/dL      Glucose 160 mg/dL      Calcium 10 1 mg/dL      Corrected Calcium 11 6 mg/dL      AST 34 U/L      ALT 33 U/L      Alkaline Phosphatase 154 U/L      Total Protein 4 9 g/dL      Albumin 2 1 g/dL      Total Bilirubin 0 49 mg/dL      eGFR 44 ml/min/1 73sq m     Narrative:      Maryam guidelines for Chronic Kidney Disease (CKD):     Stage 1 with normal or high GFR (GFR > 90 mL/min/1 73 square meters)    Stage 2 Mild CKD (GFR = 60-89 mL/min/1 73 square meters)    Stage 3A Moderate CKD (GFR = 45-59 mL/min/1 73 square meters)    Stage 3B Moderate CKD (GFR = 30-44 mL/min/1 73 square meters)    Stage 4 Severe CKD (GFR = 15-29 mL/min/1 73 square meters)    Stage 5 End Stage CKD (GFR <15 mL/min/1 73 square meters)  Note: GFR calculation is accurate only with a steady state creatinine    Protime-INR [[id number]]  (Abnormal) Collected: 05/09/22 0601    Lab Status: Final result Specimen: Blood from Arm, Left Updated: 05/09/22 0623     Protime 14 9 seconds      INR 1 21    APTT [[id number]]  (Normal) Collected: 05/09/22 0601    Lab Status: Final result Specimen: Blood from Arm, Left Updated: 05/09/22 0623     PTT 33 seconds     CBC and differential [[id number]]  (Abnormal) Collected: 05/09/22 0601    Lab Status: Final result Specimen: Blood from Arm, Left Updated: 05/09/22 0623     WBC 9 14 Thousand/uL      RBC 2 98 Million/uL      Hemoglobin 9 8 g/dL      Hematocrit 30 1 %       fL      MCH 32 9 pg      MCHC 32 6 g/dL      RDW 19 6 %      MPV 12 5 fL      Platelets 35 Thousands/uL     Narrative: This is an appended report  These results have been appended to a previously verified report  Calcium, ionized [[id number]]  (Normal) Collected: 05/09/22 0602    Lab Status: Final result Specimen: Blood from Arm, Left Updated: 05/09/22 0616     Calcium, Ionized 1 32 mmol/L                  CT head wo contrast   Final Result by Mark Sousa DO (05/09 2055)   No acute intracranial abnormality  Workstation performed: [id number]         XR chest 1 view portable   ED Interpretation by Ying Ruiz MD (05/09 4740)   No acute cardiopulmonary disease as interpreted by myself  Final Result by Jemma Montesinos MD (05/09 0422)      Questionable opacity at the left lung base  Correlate with PA and lateral films or CT scan of the chest to exclude an infiltrate in this region  The study was marked in EPIC for significant notification              Workstation performed: [id number]                    Procedures  ECG 12 Lead Documentation Only    Date/Time: 5/9/2022 6:04 AM  Performed by: Ying Ruiz MD  Authorized by: Ying Ruiz MD     ECG reviewed by me, the ED Provider: yes Patient location:  ED  Previous ECG:     Previous ECG:  Compared to current    Comparison ECG info:  1/10/19    Similarity:  No change    Comparison to cardiac monitor: Yes    Interpretation:     Interpretation: non-specific    Rate:     ECG rate:  153    ECG rate assessment: tachycardic    Rhythm:     Rhythm: sinus tachycardia    Ectopy:     Ectopy: none    QRS:     QRS axis:  Normal    QRS intervals:  Normal  Conduction:     Conduction: normal    ST segments:     ST segments:  Normal  T waves:     T waves: normal      CriticalCare Time  Performed by: Lorie Garner MD  Authorized by: Lorie Garner MD     Critical care provider statement:     Critical care time (minutes):  45    Critical care start time:  5/9/2022 6:00 AM    Critical care end time:  5/9/2022 6:45 AM    Critical care time was exclusive of:  Separately billable procedures and treating other patients    Critical care was necessary to treat or prevent imminent or life-threatening deterioration of the following conditions:  Circulatory failure and sepsis    Critical care was time spent personally by me on the following activities:  Obtaining history from patient or surrogate, development of treatment plan with patient or surrogate, evaluation of patient's response to treatment, examination of patient, review of old charts, re-evaluation of patient's condition, ordering and review of radiographic studies and ordering and review of laboratory studies    I assumed direction of critical care for this patient from another provider in my specialty: no               ED Course  ED Course as of 05/09/22 2111   Mon May 09, 2022   0623 CBC and differential(!!)  Improved from [facility] lab work on 5/3/22   0640 Called to patient's bedside for elevated heart rate to around 180 beats per minute  Blood pressure was stable with a systolic in the 758V to 673R  Decision made to give 6 mg of adenosine  Heart rate slowed down for approximately 10 seconds    Unable to determine the rhythm due to the patient writhing around  Another 6 mg of adenosine given  Heart rate slowed down to the 130s sinus rhythm, but returned again to the 180s  It appears that the patient takes metoprolol as an outpatient  Will give a dose of IV metoprolol for SVT  Default Flowsheet Data (last 720 hours)     Sepsis Reassess     Row Name 05/09/22 8735                   Repeat Volume Status and Tissue Perfusion Assessment Performed    Repeat Volume Status and Tissue Perfusion Assessment Performed Yes  -MA                  Volume Status and Tissue Perfusion Post Fluid Resuscitation * Must Document All *    Vital Signs Reviewed (HR, RR, BP, T) Yes  -MA        Shock Index Reviewed --        Arterial Oxygen Saturation Reviewed (POx, SaO2 or SpO2) --        Cardio Tachycardia  -MA        Pulmonary Clear to auscultation  -MA        Capillary Refill Brisk  -MA        Peripheral Pulses --        Skin Warm  -MA        Urine output assessed --                  *OR*   Intensive Monitoring- Must Document One of the Following Four *:    Vital Signs Reviewed --        * Central Venous Pressure (CVP or RAP) --        * Central Venous Oxygen (SVO2, ScvO2 or Oxygen saturation via central catheter) --        * Bedside Cardiovascular US in IVC diameter and % collapse --        * Passive Leg Raise OR Crystalloid Challenge --              User Key  (r) = Recorded By, (t) = Taken By, (c) = Cosigned By    Initials Name Provider Type    PRABHA Vargas DO Physician              SBIRT 20yo+      Most Recent Value   SBIRT (22 yo +)    In order to provide better care to our patients, we are screening all of our patients for alcohol and drug use  Would it be okay to ask you these screening questions? Unable to answer at this time Filed at: 05/09/2022 9944                    MDM  Number of Diagnoses or Management Options  Diagnosis management comments: Patient's presentation concerning for sepsis  Source likely urinary  Will check labs  Start Rocephin  IV fluid resuscitation  Patient will require admission  Disposition  Final diagnoses:   Tachycardia   UTI (urinary tract infection)   Sepsis ([address] )     Time reflects when diagnosis was documented in both MDM as applicable and the Disposition within this note     Time User Action Codes Description Comment    5/9/2022  6:57 AM Lissette Ripa Add [R00 0] Tachycardia     5/9/2022  6:57 AM Lissette Ripa Add [N39 0] UTI (urinary tract infection)     5/9/2022  6:57 AM Lissette Ripa Add [A41 9] Sepsis [facility])       ED Disposition     ED Disposition Condition Date/Time Comment    Admit Stable Mon May 9, 2022  8:23 AM Case was discussed with Dr Lucina Do and the patient's admission status was agreed to be Admission Status: inpatient status to the service of Dr Lucina Do          Follow-up Information    None         Current Discharge Medication List      CONTINUE these medications which have NOT CHANGED    Details   allopurinol (ZYLOPRIM) 300 mg tablet Take 300 mg by mouth daily      bisacodyl (DULCOLAX) 10 mg suppository Insert 10 mg into the rectum daily      metFORMIN (GLUCOPHAGE) 500 mg tablet Take 500 mg by mouth 2 (two) times a day with meals      Miconazole POWD Use      ondansetron (ZOFRAN) 8 mg tablet Take by mouth every 8 (eight) hours as needed for nausea or vomiting      acetaminophen (TYLENOL) 500 mg tablet Take 500 mg by mouth every 6 (six) hours as needed for mild pain      acyclovir (ZOVIRAX) 400 MG tablet Take 400 mg by mouth every 4 (four) hours while awake      albuterol (PROAIR HFA) 90 mcg/act inhaler Inhale 2 puffs every 6 (six) hours as needed for wheezing  Qty: 8 5 g, Refills: 2    Associated Diagnoses: Multifocal pneumonia      ascorbic acid (VITAMIN C) 500 mg tablet Take 500 mg by mouth daily      aspirin 81 mg chewable tablet Chew 81 mg daily      Bioflavonoid Products (BIOFLEX PO) Take 1 tablet by mouth daily      BIOTIN PO Take 5,000 mcg by mouth 2 (two) times a day      Calcium Carb-Cholecalciferol (OS-ANTONIETA PO) Take 1 tablet by mouth daily      CALCIUM CARBONATE-VIT D-MIN PO Take 1 tablet by mouth daily      chlorhexidine (PERIDEX) 0 12 % solution Apply 15 mL to the mouth or throat 2 (two) times a day      Dexamethasone (DECADRON PO) Take by mouth      fluticasone (FLONASE) 50 mcg/act nasal spray 2 sprays into each nostril daily      fluticasone-vilanterol (BREO ELLIPTA) 100-25 mcg/inh inhaler Inhale 1 puff daily Rinse mouth after use       gabapentin (NEURONTIN) 300 mg capsule Take 100 mg by mouth 2 (two) times a day      ipratropium (ATROVENT) 0 02 % nebulizer solution Take 0 25 mg by nebulization 2 (two) times a day      LORazepam (ATIVAN) 0 5 mg tablet Take 0 5 mg by mouth every 6 (six) hours as needed for anxiety      metoprolol succinate (TOPROL-XL) 50 mg 24 hr tablet Take 1 tablet (50 mg total) by mouth daily  Qty: 30 tablet, Refills: 1    Associated Diagnoses: Essential hypertension      morphine (MS CONTIN) 15 mg 12 hr tablet Take 15 mg by mouth every 4 (four) hours as needed for severe pain      Multiple Vitamin (MULTIVITAMIN) tablet Take 1 tablet by mouth daily      omeprazole (PriLOSEC) 20 mg delayed release capsule Take 20 mg by mouth daily      potassium chloride (K-DUR,KLOR-CON) 10 mEq tablet Take 20 mEq by mouth 2 (two) times a day      prochlorperazine (COMPAZINE) 10 mg tablet Take 10 mg by mouth every 6 (six) hours as needed for nausea or vomiting      SENNOSIDES PO Take 45 mg by mouth daily             No discharge procedures on file      PDMP Review     None          ED Provider  Electronically Signed by           Garo Watson MD  05/09/22 8447

## 2022-05-09 NOTE — QUICK NOTE
Progress Note - Triage Asssessment   Rosey Espinosa 78 y o  female MRN: [id number]    Time Called ([address] [facility] [address] Time): 9753  Date Called: 05/09/22  Room#: ED23  Person requesting evaluation: Dr Lloyd Kurtz    Situation:    Patient is a 67yo female w/ recurrent MM who presented to [facility] this AM w/ tachycardia and palpitations  Found to be in SVT in the 150-180s  To this time patient has received adenosine 6x2, metoprolol, and his currently on diltiazem gtt @12 5  Current HR is in the 130s sinus tach  Interventions:   Stop diltiazem as patient is in sinus rhythm  Supportive care w/ treatment of pain, infection, fluids            Triage Assessment:     Patient to be admitted to step down level of care    Recommendations discussed with Dr Ulysses Guillermo

## 2022-05-09 NOTE — ED CARE HANDOFF
Emergency Department Sign Out Note        Sign out and transfer of care from Dr Davi Park  See Separate Emergency Department note  The patient, Doreen Sutherland, was evaluated by the previous provider for rapid heart rate, sepsis  Workup Completed:  Labs, Urine, CXR  ED Course / Workup Pending (followup): Sepsis Reassessment      Most Recent Value   Volume Status and Tissue Perfusion Post Fluid Resuscitation * Must Document All *    Vital Signs Reviewed (HR, RR, BP, T) Yes Filed at: 05/09/2022 5039   Shock Index Reviewed --   Arterial Oxygen Saturation Reviewed (POx, SaO2 or SpO2) --   Cardio Tachycardia Filed at: 05/09/2022 2991   Pulmonary Clear to auscultation Filed at: 05/09/2022 2628   Capillary Refill Brisk Filed at: 05/09/2022 6126   Peripheral Pulses --   Peripheral Pulse --   Dorsalis Pedis --   Posterior Tibialis --   Skin Warm Filed at: 05/09/2022 3398   Urine output assessed --   *OR*   Intensive Monitoring- Must Document One of the Following Four *:    Vital Signs Reviewed --   * Central Venous Pressure (CVP or RAP) --   * Central Venous Oxygen (SVO2, ScvO2 or Oxygen saturation via central catheter) --   * Bedside Cardiovascular US in IVC diameter and % collapse --   CO --   EF --   IVC Diameter --   % Collapse --   * Passive Leg Raise OR Crystalloid Challenge --   Passive leg exam --   Crystalloid fluid challenge completed --                    ED Course as of 05/09/22 1030   Mon May 09, 2022   1897 Patient received in sign out, urosepsis, antibiotics and IVF already ordered, narrow complex tachycardia, SVT Vs Afib, Adenosine and Metoprolol given, we will re-eval if patient would need Cardizem drip and admit     0719 HR still in 130s, we will order Cardizem drip    [id number] Case discussed with Dr Marge Crouch, [facility] via TT, requested to run by CC, subsequently d/w [facility] AP(Woody Rodgers), advised can admit to SD under SLIM  Dr Marge Crouch accepted the patient  Procedures  MDM        Disposition  Final diagnoses:   Tachycardia   UTI (urinary tract infection)   Sepsis ([address] )     Time reflects when diagnosis was documented in both MDM as applicable and the Disposition within this note     Time User Action Codes Description Comment    5/9/2022  6:57 AM Lethea Rand Add [R00 0] Tachycardia     5/9/2022  6:57 AM Lethea Rand Add [N39 0] UTI (urinary tract infection)     5/9/2022  6:57 AM Lethea Rand Add [A41 9] Sepsis [facility])       ED Disposition     ED Disposition Condition Date/Time Comment    Admit Stable Mon May 9, 2022  8:23 AM Case was discussed with Dr Conor Salomon and the patient's admission status was agreed to be Admission Status: inpatient status to the service of Dr Conor Salomon  Follow-up Information    None       Patient's Medications   Discharge Prescriptions    No medications on file     No discharge procedures on file         ED Provider  Electronically Signed by     Bambi Mathews MD  05/09/22 3953

## 2022-05-09 NOTE — ASSESSMENT & PLAN NOTE
Lab Results   Component Value Date    HGBA1C 6 8 (H) 01/19/2022       No results for input(s): POCGLU in the last 72 hours  Blood Sugar Average: Last 72 hrs:     Hold metformin  Will start insulin sliding scale

## 2022-05-09 NOTE — ASSESSMENT & PLAN NOTE
Heart rate of 180s  Given adenosine of 12mg  Metoprolol 10mg and started on cardizem gtt  Now sinus tach in of 108  Will monitor on tele  Will continue metoprolol   Cycle out troponins  Repeat ekg

## 2022-05-10 ENCOUNTER — APPOINTMENT (INPATIENT)
Dept: RADIOLOGY | Facility: HOSPITAL | Age: 67
DRG: 871 | End: 2022-05-10
Payer: MEDICARE

## 2022-05-10 PROBLEM — E87.6 HYPOKALEMIA: Status: ACTIVE | Noted: 2022-05-10

## 2022-05-10 PROBLEM — F11.20 CONTINUOUS OPIOID DEPENDENCE (HCC): Status: ACTIVE | Noted: 2022-05-10

## 2022-05-10 PROBLEM — G93.40 ACUTE ENCEPHALOPATHY: Status: ACTIVE | Noted: 2022-05-10

## 2022-05-10 LAB
ABO GROUP BLD: NORMAL
ANION GAP SERPL CALCULATED.3IONS-SCNC: 6 MMOL/L (ref 4–13)
ANION GAP SERPL CALCULATED.3IONS-SCNC: 7 MMOL/L (ref 4–13)
ATRIAL RATE: 90 BPM
BLD GP AB SCN SERPL QL: NEGATIVE
BUN SERPL-MCNC: 10 MG/DL (ref 5–25)
BUN SERPL-MCNC: 10 MG/DL (ref 5–25)
CA-I BLD-SCNC: 1.21 MMOL/L (ref 1.12–1.32)
CALCIUM SERPL-MCNC: 8.2 MG/DL (ref 8.3–10.1)
CALCIUM SERPL-MCNC: 9.1 MG/DL (ref 8.3–10.1)
CHLORIDE SERPL-SCNC: 115 MMOL/L (ref 100–108)
CHLORIDE SERPL-SCNC: 115 MMOL/L (ref 100–108)
CO2 SERPL-SCNC: 23 MMOL/L (ref 21–32)
CO2 SERPL-SCNC: 26 MMOL/L (ref 21–32)
CREAT SERPL-MCNC: 0.76 MG/DL (ref 0.6–1.3)
CREAT SERPL-MCNC: 1.05 MG/DL (ref 0.6–1.3)
ERYTHROCYTE [DISTWIDTH] IN BLOOD BY AUTOMATED COUNT: 20.2 % (ref 11.6–15.1)
GFR SERPL CREATININE-BSD FRML MDRD: 55 ML/MIN/1.73SQ M
GFR SERPL CREATININE-BSD FRML MDRD: 81 ML/MIN/1.73SQ M
GLUCOSE SERPL-MCNC: 109 MG/DL (ref 65–140)
GLUCOSE SERPL-MCNC: 110 MG/DL (ref 65–140)
GLUCOSE SERPL-MCNC: 127 MG/DL (ref 65–140)
GLUCOSE SERPL-MCNC: 131 MG/DL (ref 65–140)
GLUCOSE SERPL-MCNC: 165 MG/DL (ref 65–140)
GLUCOSE SERPL-MCNC: 93 MG/DL (ref 65–140)
HCT VFR BLD AUTO: 21.8 % (ref 34.8–46.1)
HGB BLD-MCNC: 6.7 G/DL (ref 11.5–15.4)
MAGNESIUM SERPL-MCNC: 0.7 MG/DL (ref 1.6–2.6)
MCH RBC QN AUTO: 32.5 PG (ref 26.8–34.3)
MCHC RBC AUTO-ENTMCNC: 30.7 G/DL (ref 31.4–37.4)
MCV RBC AUTO: 106 FL (ref 82–98)
NT-PROBNP SERPL-MCNC: 4622 PG/ML
PLATELET # BLD AUTO: 19 THOUSANDS/UL (ref 149–390)
PMV BLD AUTO: 11.4 FL (ref 8.9–12.7)
POTASSIUM SERPL-SCNC: 2.5 MMOL/L (ref 3.5–5.3)
POTASSIUM SERPL-SCNC: 3.2 MMOL/L (ref 3.5–5.3)
PROCALCITONIN SERPL-MCNC: 21.88 NG/ML
QRS AXIS: 65 DEGREES
QRSD INTERVAL: 86 MS
QT INTERVAL: 276 MS
QTC INTERVAL: 469 MS
RBC # BLD AUTO: 2.06 MILLION/UL (ref 3.81–5.12)
RH BLD: POSITIVE
SODIUM SERPL-SCNC: 145 MMOL/L (ref 136–145)
SODIUM SERPL-SCNC: 147 MMOL/L (ref 136–145)
SPECIMEN EXPIRATION DATE: NORMAL
T WAVE AXIS: 35 DEGREES
VENTRICULAR RATE: 174 BPM
WBC # BLD AUTO: 7.57 THOUSAND/UL (ref 4.31–10.16)

## 2022-05-10 PROCEDURE — 83880 ASSAY OF NATRIURETIC PEPTIDE: CPT | Performed by: INTERNAL MEDICINE

## 2022-05-10 PROCEDURE — 86921 COMPATIBILITY TEST INCUBATE: CPT

## 2022-05-10 PROCEDURE — 92526 ORAL FUNCTION THERAPY: CPT

## 2022-05-10 PROCEDURE — 87081 CULTURE SCREEN ONLY: CPT | Performed by: INTERNAL MEDICINE

## 2022-05-10 PROCEDURE — 84145 PROCALCITONIN (PCT): CPT | Performed by: INTERNAL MEDICINE

## 2022-05-10 PROCEDURE — 86901 BLOOD TYPING SEROLOGIC RH(D): CPT | Performed by: INTERNAL MEDICINE

## 2022-05-10 PROCEDURE — 86900 BLOOD TYPING SEROLOGIC ABO: CPT | Performed by: INTERNAL MEDICINE

## 2022-05-10 PROCEDURE — 99233 SBSQ HOSP IP/OBS HIGH 50: CPT | Performed by: INTERNAL MEDICINE

## 2022-05-10 PROCEDURE — 82948 REAGENT STRIP/BLOOD GLUCOSE: CPT

## 2022-05-10 PROCEDURE — 82330 ASSAY OF CALCIUM: CPT | Performed by: NURSE PRACTITIONER

## 2022-05-10 PROCEDURE — 71045 X-RAY EXAM CHEST 1 VIEW: CPT

## 2022-05-10 PROCEDURE — 80048 BASIC METABOLIC PNL TOTAL CA: CPT | Performed by: INTERNAL MEDICINE

## 2022-05-10 PROCEDURE — 30233N1 TRANSFUSION OF NONAUTOLOGOUS RED BLOOD CELLS INTO PERIPHERAL VEIN, PERCUTANEOUS APPROACH: ICD-10-PCS | Performed by: INTERNAL MEDICINE

## 2022-05-10 PROCEDURE — 86850 RBC ANTIBODY SCREEN: CPT | Performed by: INTERNAL MEDICINE

## 2022-05-10 PROCEDURE — 97163 PT EVAL HIGH COMPLEX 45 MIN: CPT

## 2022-05-10 PROCEDURE — 94760 N-INVAS EAR/PLS OXIMETRY 1: CPT

## 2022-05-10 PROCEDURE — 93010 ELECTROCARDIOGRAM REPORT: CPT | Performed by: INTERNAL MEDICINE

## 2022-05-10 PROCEDURE — 83735 ASSAY OF MAGNESIUM: CPT | Performed by: INTERNAL MEDICINE

## 2022-05-10 PROCEDURE — P9016 RBC LEUKOCYTES REDUCED: HCPCS

## 2022-05-10 PROCEDURE — 86922 COMPATIBILITY TEST ANTIGLOB: CPT

## 2022-05-10 PROCEDURE — 85027 COMPLETE CBC AUTOMATED: CPT | Performed by: INTERNAL MEDICINE

## 2022-05-10 PROCEDURE — 94640 AIRWAY INHALATION TREATMENT: CPT

## 2022-05-10 RX ORDER — METOPROLOL TARTRATE 5 MG/5ML
2.5 INJECTION INTRAVENOUS ONCE
Status: COMPLETED | OUTPATIENT
Start: 2022-05-10 | End: 2022-05-10

## 2022-05-10 RX ORDER — POTASSIUM CHLORIDE 20 MEQ/1
40 TABLET, EXTENDED RELEASE ORAL 2 TIMES DAILY
Status: DISCONTINUED | OUTPATIENT
Start: 2022-05-10 | End: 2022-05-14 | Stop reason: HOSPADM

## 2022-05-10 RX ORDER — POTASSIUM CHLORIDE 20 MEQ/1
40 TABLET, EXTENDED RELEASE ORAL DAILY
Status: DISCONTINUED | OUTPATIENT
Start: 2022-05-10 | End: 2022-05-10

## 2022-05-10 RX ORDER — METOPROLOL TARTRATE 5 MG/5ML
5 INJECTION INTRAVENOUS ONCE
Status: COMPLETED | OUTPATIENT
Start: 2022-05-10 | End: 2022-05-10

## 2022-05-10 RX ORDER — MORPHINE SULFATE 15 MG/1
15 TABLET, FILM COATED, EXTENDED RELEASE ORAL EVERY 12 HOURS SCHEDULED
Status: DISCONTINUED | OUTPATIENT
Start: 2022-05-10 | End: 2022-05-14 | Stop reason: HOSPADM

## 2022-05-10 RX ORDER — METOPROLOL TARTRATE 50 MG/1
50 TABLET, FILM COATED ORAL EVERY 12 HOURS SCHEDULED
Status: DISCONTINUED | OUTPATIENT
Start: 2022-05-10 | End: 2022-05-10

## 2022-05-10 RX ORDER — MAGNESIUM SULFATE HEPTAHYDRATE 40 MG/ML
2 INJECTION, SOLUTION INTRAVENOUS EVERY 12 HOURS
Status: COMPLETED | OUTPATIENT
Start: 2022-05-10 | End: 2022-05-11

## 2022-05-10 RX ADMIN — METOPROLOL TARTRATE 25 MG: 25 TABLET, FILM COATED ORAL at 21:09

## 2022-05-10 RX ADMIN — ACYCLOVIR 400 MG: 200 CAPSULE ORAL at 11:50

## 2022-05-10 RX ADMIN — INSULIN LISPRO 1 UNITS: 100 INJECTION, SOLUTION INTRAVENOUS; SUBCUTANEOUS at 11:48

## 2022-05-10 RX ADMIN — ACYCLOVIR 400 MG: 200 CAPSULE ORAL at 17:18

## 2022-05-10 RX ADMIN — POTASSIUM CHLORIDE 40 MEQ: 20 TABLET, EXTENDED RELEASE ORAL at 08:21

## 2022-05-10 RX ADMIN — MAGNESIUM SULFATE HEPTAHYDRATE 2 G: 40 INJECTION, SOLUTION INTRAVENOUS at 20:59

## 2022-05-10 RX ADMIN — POTASSIUM CHLORIDE 40 MEQ: 20 TABLET, EXTENDED RELEASE ORAL at 17:18

## 2022-05-10 RX ADMIN — SENNOSIDES 8.6 MG: 8.6 TABLET ORAL at 21:09

## 2022-05-10 RX ADMIN — MORPHINE SULFATE 5 MG: 100 SOLUTION ORAL at 17:17

## 2022-05-10 RX ADMIN — CEFEPIME HYDROCHLORIDE 2000 MG: 2 INJECTION, POWDER, FOR SOLUTION INTRAVENOUS at 11:37

## 2022-05-10 RX ADMIN — METOPROLOL TARTRATE 2.5 MG: 1 INJECTION, SOLUTION INTRAVENOUS at 03:06

## 2022-05-10 RX ADMIN — FLUTICASONE FUROATE AND VILANTEROL TRIFENATATE 1 PUFF: 100; 25 POWDER RESPIRATORY (INHALATION) at 08:21

## 2022-05-10 RX ADMIN — ACYCLOVIR 400 MG: 200 CAPSULE ORAL at 21:25

## 2022-05-10 RX ADMIN — GABAPENTIN 100 MG: 100 CAPSULE ORAL at 08:20

## 2022-05-10 RX ADMIN — CEFEPIME HYDROCHLORIDE 2000 MG: 2 INJECTION, POWDER, FOR SOLUTION INTRAVENOUS at 22:34

## 2022-05-10 RX ADMIN — METOPROLOL TARTRATE 5 MG: 1 INJECTION, SOLUTION INTRAVENOUS at 08:53

## 2022-05-10 RX ADMIN — VANCOMYCIN HYDROCHLORIDE 1250 MG: 5 INJECTION, POWDER, LYOPHILIZED, FOR SOLUTION INTRAVENOUS at 11:36

## 2022-05-10 RX ADMIN — ACYCLOVIR 400 MG: 200 CAPSULE ORAL at 05:01

## 2022-05-10 RX ADMIN — ONDANSETRON 4 MG: 2 INJECTION INTRAMUSCULAR; INTRAVENOUS at 20:59

## 2022-05-10 RX ADMIN — VANCOMYCIN HYDROCHLORIDE 750 MG: 750 INJECTION, SOLUTION INTRAVENOUS at 23:56

## 2022-05-10 RX ADMIN — PANTOPRAZOLE SODIUM 20 MG: 20 TABLET, DELAYED RELEASE ORAL at 05:01

## 2022-05-10 RX ADMIN — B-COMPLEX W/ C & FOLIC ACID TAB 1 TABLET: TAB at 08:20

## 2022-05-10 RX ADMIN — MORPHINE SULFATE 15 MG: 15 TABLET, EXTENDED RELEASE ORAL at 21:09

## 2022-05-10 RX ADMIN — GABAPENTIN 100 MG: 100 CAPSULE ORAL at 17:18

## 2022-05-10 RX ADMIN — OXYCODONE HYDROCHLORIDE AND ACETAMINOPHEN 500 MG: 500 TABLET ORAL at 08:21

## 2022-05-10 RX ADMIN — MAGNESIUM SULFATE HEPTAHYDRATE 2 G: 40 INJECTION, SOLUTION INTRAVENOUS at 08:46

## 2022-05-10 RX ADMIN — METOPROLOL TARTRATE 25 MG: 25 TABLET, FILM COATED ORAL at 08:20

## 2022-05-10 RX ADMIN — ACYCLOVIR 400 MG: 200 CAPSULE ORAL at 14:22

## 2022-05-10 RX ADMIN — ACETAMINOPHEN 650 MG: 325 TABLET ORAL at 02:39

## 2022-05-10 RX ADMIN — IPRATROPIUM BROMIDE 0.25 MG: 0.5 SOLUTION RESPIRATORY (INHALATION) at 07:22

## 2022-05-10 RX ADMIN — ALLOPURINOL 200 MG: 100 TABLET ORAL at 08:21

## 2022-05-10 NOTE — PLAN OF CARE
Problem: PHYSICAL THERAPY ADULT  Goal: Performs mobility at highest level of function for planned discharge setting  See evaluation for individualized goals  Description: Treatment/Interventions: Functional transfer training,Elevations,LE strengthening/ROM,Therapeutic exercise,Endurance training,Patient/family training,Bed mobility,Gait training,Spoke to nursing          See flowsheet documentation for full assessment, interventions and recommendations  Note: Prognosis: Fair  Problem List: Decreased strength,Decreased endurance,Impaired balance,Decreased mobility,Decreased cognition,Impaired judgement,Decreased safety awareness  Assessment: Pt is a 79 y/o female pt presented from [facility] on 5/9/22 with symptoms of tachycardia ( bpm)  Pt admitted with sepsis 2* to possible UTI and PNA, supra ventricular tachycardia, MIK, ambulatory dysfunction, chronic respiratory failure 2* to COPD, acute encephalopathy, and hypokalemia  Of note, pt was recently admitted at [facility] on 4/22/22 2* to recurrence of multiple myeloma then was D/C to [facility] for STR  Pt's PMH that may impact PT session includes continuous opioid dependence, T2DM, GERD, and thrombocytopenia  Pt referred to PT for mobility assessment and discharge planning  On eval, patient demonstrated modAx1 with bed mobility, transfers, and amb  Pt performed sit<>stand at EOB, and took aprox 5 lateral side steps towards HOB w/ RW and modAx1  Pt requested to sit following lateral steps 2* to fatigue, stating "she can't do it anymore"  Pt's SpO2 was 97% on 4L NC and BP was 103/100 following functional mobility  Pt denied SOB, dizziness, or pain throughout session  The patient's AM-PAC Basic Mobility Inpatient Short Form Raw Score is 11  A Raw score of less than or equal to 16 suggests the patient may benefit from discharge to post-acute rehabilitation services    From PT standpoint, pt would benefit from continued STR at D/C to achieve highest level of function prior to return home  Will continue PT per POC  At end of session, patient stable and supine w/ all lines intact, needs within reach, and bed alarm activated  Patient educated to call nsg staff to assist with OOB activities  Nsg notified  Barriers to Discharge: Inaccessible home environment,Decreased caregiver support        PT Discharge Recommendation: Post acute rehabilitation services (Return to [facility])          See flowsheet documentation for full assessment

## 2022-05-10 NOTE — QUICK NOTE
Reviewed cxr  Showing possible pna  Will add vancomycin to cefepime as procal has elevated   Check mrsa

## 2022-05-10 NOTE — PLAN OF CARE
Problem: MOBILITY - ADULT  Goal: Maintain or return to baseline ADL function  Description: INTERVENTIONS:  -  Assess patient's ability to carry out ADLs; assess patient's baseline for ADL function and identify physical deficits which impact ability to perform ADLs (bathing, care of mouth/teeth, toileting, grooming, dressing, etc )  - Assess/evaluate cause of self-care deficits   - Assess range of motion  - Assess patient's mobility; develop plan if impaired  - Assess patient's need for assistive devices and provide as appropriate  - Encourage maximum independence but intervene and supervise when necessary  - Involve family in performance of ADLs  - Assess for home care needs following discharge   - Consider OT consult to assist with ADL evaluation and planning for discharge  - Provide patient education as appropriate  5/10/2022 0750 by Cassandra Badillo RN  Outcome: Progressing  5/10/2022 0750 by Cassandra Badillo RN  Outcome: Progressing  Goal: Maintains/Returns to pre admission functional level  Description: INTERVENTIONS:  - Perform BMAT or MOVE assessment daily    - Set and communicate daily mobility goal to care team and patient/family/caregiver  - Collaborate with rehabilitation services on mobility goals if consulted  - Perform Range of Motion 2 times a day  - Reposition patient every2 hours    - Dangle patient 2 times a day  - Stand [id number] a day  Problem: PAIN - ADULT  Goal: Verbalizes/displays adequate comfort level or baseline comfort level  Description: Interventions:  - Encourage patient to monitor pain and request assistance  - Assess pain using appropriate pain scale  - Administer analgesics based on type and severity of pain and evaluate response  - Implement non-pharmacological measures as appropriate and evaluate response  - Consider cultural and social influences on pain and pain management  - Notify physician/advanced practitioner if interventions unsuccessful or patient reports new pain  5/10/2022 0750 by Shea Tom RN  Outcome: Progressing  5/10/2022 0750 by Shea Tom RN  Outcome: Progressing     Problem: INFECTION - ADULT  Goal: Absence or prevention of progression during hospitalization  Description: INTERVENTIONS:  - Assess and monitor for signs and symptoms of infection  - Monitor lab/diagnostic results  - Monitor all insertion sites, i e  indwelling lines, tubes, and drains  - Monitor endotracheal if appropriate and nasal secretions for changes in amount and color  - [place] appropriate cooling/warming therapies per order  - Administer medications as ordered  - Instruct and encourage patient and family to use good hand hygiene technique  - Identify and instruct in appropriate isolation precautions for identified infection/condition  5/10/2022 0750 by Shea Tom RN  Outcome: Progressing  5/10/2022 0750 by Shea Tom RN  Outcome: Progressing  Goal: Absence of fever/infection during neutropenic period  Description: INTERVENTIONS:  - Monitor WBC    5/10/2022 0750 by Shea Tom RN  Outcome: Progressing  5/10/2022 0750 by Shea Tom RN  Outcome: Progressing     Problem: DISCHARGE PLANNING  Goal: Discharge to home or other facility with appropriate resources  Description: INTERVENTIONS:  - Identify barriers to discharge w/patient and caregiver  - Arrange for needed discharge resources and transportation as appropriate  - Identify discharge learning needs (meds, wound care, etc )  - Arrange for interpretive services to assist at discharge as needed  - Refer to Case Management Department for coordinating discharge planning if the patient needs post-hospital services based on physician/advanced practitioner order or complex needs related to functional status, cognitive ability, or social support system  5/10/2022 0750 by Shea Tom RN  Outcome: Progressing  5/10/2022 0750 by Shea Tom RN  Outcome: Progressing Problem: Knowledge Deficit  Goal: Patient/family/caregiver demonstrates understanding of disease process, treatment plan, medications, and discharge instructions  Description: Complete learning assessment and assess knowledge base    Interventions:  - Provide teaching at level of understanding  - Provide teaching via preferred learning methods  5/10/2022 0750 by Hakan Pearl RN  Outcome: Progressing  5/10/2022 0750 by Hakan Pearl RN  Outcome: Progressing     - Out of bed for toileting  - Record patient progress and toleration of activity level   5/10/2022 0750 by Hakan Pearl RN  Outcome: Progressing  5/10/2022 0750 by Hakan Pearl RN  Outcome: Progressing

## 2022-05-10 NOTE — ASSESSMENT & PLAN NOTE
Baseline is 0 6-0 8  Creatinine on admit is  1 2  Pt was treated with iv fluids and nephrotoxic medications were held  Creatinine is 0 7 today

## 2022-05-10 NOTE — PROGRESS NOTES
[facility]  Progress Note - Alberto Ram 1955, 78 y o  female MRN: [id number]  Unit/Bed#: E4 -01 Encounter: [id number]  Primary Care Provider: No primary care provider on file  Date and time admitted to hospital: 5/9/2022  5:42 AM    * Sepsis [facility])  Assessment & Plan  Fever 101 and tachycardia  Lactic acidosis on admission that has resolved  procal 9 6 on admission and has worsened to 21 8  Chronic costello  Possible uti due to chronic costello  Costello was changed in the ed  Given ceftriaxone but broadened to cefepime yesterday   cxr reveals questionable left lower opacity  will repeat cxr and adjust antibiotics if needed  covid was negative   Will follow up on urine cultures and blood culture      Hypokalemia  Assessment & Plan  Potassium 2 5 mg 0 7  Will replace mg and potassium  Check levels in am      Acute encephalopathy  Assessment & Plan  Acute metabolic/toxic encephalopathy  Likely related to sepsis and narcotic use  Ct head negative  Pt currently oriented x3     Continuous opioid dependence ([facility] )  Assessment & Plan  Due to chronic pain from multiple myeloma  Pt is on 15mg of MS contin bid with morphine po 10 mg q6 hours  She had increased lethargy yesterday along with hypotension in which morphine dose was reduced  Currently on 5mg morphine po q6 hours  Will need to increase morphine slowly to avoid hypotension and but also avoid withdrawal          Type 2 diabetes mellitus [facility])  Assessment & Plan  Lab Results   Component Value Date    HGBA1C 6 2 (H) 05/09/2022       Recent Labs     05/09/22  1145 05/09/22  1602 05/09/22  2038 05/10/22  0725   POCGLU 141* 218* 152* 110       Blood Sugar Average: Last 72 hrs:  (P) 155 25   Hold metformin  Continue insulin sliding scale  Chronic respiratory failure (HCC)  Assessment & Plan  Due to copd  Uses 3 liters of oxygen continuously   Currently using 4 liters  No exacerbation of copd   Continue breo and bronchodilators  Possible pna  Repeat cxr  Also check pro bnp due to svt/sinus tach and approx 5 liters of fluids yesterday   Wean oxygen as tolerated    SVT (supraventricular tachycardia) ([facility])  Assessment & Plan  Heart rate of 180s in ed in which pt was treated with adenosine of 12mg, Metoprolol IV 10mg and started on cardizem gtt which was d/yasemin as pts was sinus tach and heart rate improved  She is on Toprol 100mg bid outpt  This was originally held due to hypotension  She was restarted on 25mg this am  Heart rate currently 150 which appears sinus tach  Will give an extra dose of 5mg of metoprolol and continue 25mg bid  Will try to increase metoprolol today if bp allows  Pain vs withdrawal from pain medications could also be driving tachycardia  Will work on adjusting narcotic dose depending sbp  Also replace mg and potassium  MIK (acute kidney injury) ([facility] [address] )  Assessment & Plan  Baseline is 0 6-0 8  Creatinine on admit is  1 2  Pt was treated with iv fluids and nephrotoxic medications were held  Creatinine is 0 7 today     Ambulatory dysfunction  Assessment & Plan  Pt from [facility]  Consult pt/ot    Thrombocytopenia ([facility] [address] )  Assessment & Plan  Due to multiple myeloma  Platelets stable in the 35s  Awaiting repeat cbc    GERD (gastroesophageal reflux disease)  Assessment & Plan  Continue ppi     Multiple myeloma ([facility] [address] )  Assessment & Plan  With thrombocytopenia and anemia  Follows with oncology outpt    pressure injury buttock- poa  Continue wound care    VTE Pharmacologic Prophylaxis: contraindicated due to thrombocytopenia    Mechanical VTE Prophylaxis in Place: Yes    Patient Centered Rounds: nurse present in room during assessment    Education and Discussions with Family / Patient: called her  and left vm    Time Spent for Care: 30 minutes  More than 50% of total time spent on counseling and coordination of care as described above  Reviewing pts chart  Discussing with pt and with nursing staff  Current Length of Stay: 1 day(s)  Current Patient Status: Inpatient     Discharge Plan / Estimated Discharge Date: greater than 72 hours    Code Status: Level 1 - Full Code      Subjective:   Pt seen and examined with nurse in the room  Pt is wide awake  She denies any pain  She states she is having a little bit of a dry cough which is new for her  No chest pain  No shortness of breath  No n/v/d no abd pain  Objective:     Vitals:   Temp (24hrs), Av 9 °F (36 6 °C), Min:97 1 °F (36 2 °C), Max:99 °F (37 2 °C)    Temp:  [97 1 °F (36 2 °C)-99 °F (37 2 °C)] 97 1 °F (36 2 °C)  HR:  [] 100  Resp:  [16-20] 16  BP: ()/(40-71) 111/59  SpO2:  [97 %-100 %] 100 %  Body mass index is 36 98 kg/m²  Input and Output Summary (last 24 hours): Intake/Output Summary (Last 24 hours) at 5/10/2022 0856  Last data filed at 5/10/2022 0736  Gross per 24 hour   Intake 4550 ml   Output 1835 ml   Net 2715 ml       Physical Exam:   Physical Exam  Constitutional:       Appearance: Normal appearance  HENT:      Head: Normocephalic and atraumatic  Eyes:      Extraocular Movements: Extraocular movements intact  Pupils: Pupils are equal, round, and reactive to light  Cardiovascular:      Rate and Rhythm: Regular rhythm  Tachycardia present  Heart sounds: No murmur heard  No friction rub  No gallop  Pulmonary:      Effort: Pulmonary effort is normal  No respiratory distress  Breath sounds: Rhonchi present  No wheezing or rales  Abdominal:      General: Bowel sounds are normal  There is no distension  Palpations: Abdomen is soft  Tenderness: There is no abdominal tenderness  There is no guarding  Musculoskeletal:      Right lower leg: No edema  Left lower leg: No edema  Neurological:      Mental Status: She is alert and oriented to person, place, and time  Comments:  With minimal confusion           Additional Data:     Labs:  Results from last 7 days   Lab Units 05/09/22  0601   WBC Thousand/uL 9 14   HEMOGLOBIN g/dL 9 8*   HEMATOCRIT % 30 1*   PLATELETS Thousands/uL 35*   BANDS PCT % 2   LYMPHO PCT % 3*   MONO PCT % 8   EOS PCT % 0     Results from last 7 days   Lab Units 05/10/22  0609 05/09/22  0601 05/09/22  0601   SODIUM mmol/L 145   < > 138   POTASSIUM mmol/L 2 5*   < > 3 6   CHLORIDE mmol/L 115*   < > 102   CO2 mmol/L 23   < > 26   BUN mg/dL 10   < > 12   CREATININE mg/dL 0 76   < > 1 26   ANION GAP mmol/L 7   < > 10   CALCIUM mg/dL 8 2*   < > 10 1   ALBUMIN g/dL  --   --  2 1*   TOTAL BILIRUBIN mg/dL  --   --  0 49   ALK PHOS U/L  --   --  154*   ALT U/L  --   --  33   AST U/L  --   --  34   GLUCOSE RANDOM mg/dL 109   < > 160*    < > = values in this interval not displayed  Results from last 7 days   Lab Units 05/09/22  0601   INR  1 21*     Results from last 7 days   Lab Units 05/10/22  0725 05/09/22  2038 05/09/22  1602 05/09/22  1145   POC GLUCOSE mg/dl 110 152* 218* 141*     Results from last 7 days   Lab Units 05/09/22  0439   HEMOGLOBIN A1C % 6 2*     Results from last 7 days   Lab Units 05/10/22  0609 05/09/22  1330 05/09/22  1146 05/09/22  0947 05/09/22  0752 05/09/22  0601 05/09/22  0601   LACTIC ACID mmol/L  --  1 4 3 0* 2 4* 3 2*   < > 3 8*   PROCALCITONIN ng/ml 21 88*  --   --   --   --   --  9 60*    < > = values in this interval not displayed  Recent Cultures (last 7 days):     Results from last 7 days   Lab Units 05/09/22  0616 05/09/22  9963   BLOOD CULTURE  Received in Microbiology Lab  Culture in Progress  Received in Microbiology Lab  Culture in Progress         Lines/Drains:  Invasive Devices  Report    Peripheral Intravenous Line            Peripheral IV 05/09/22 Left Antecubital 1 day    Peripheral IV 05/09/22 Left Hand 1 day    Peripheral IV 05/09/22 Right Wrist 1 day          Drain            Urethral Catheter Temperature probe 16 Fr  1 day                Telemetry:   Telemetry Orders (From admission, onward)             48 Hour Telemetry Monitoring  Continuous x 48 hours        References:    Telemetry Guidelines   Question:  Reason for 48 Hour Telemetry  Answer:  Arrhythmias Requiring Medical Therapy (eg  SVT, Vtach/fib, Bradycardia, Uncontrolled A-fib)                    Last 24 Hours Medication List:   Current Facility-Administered Medications   Medication Dose Route Frequency Provider Last Rate    acetaminophen  650 mg Oral Q6H PRN Dada Downing DO      acyclovir  400 mg Oral Q4H While Awake Edelmira Ambron, DO      allopurinol  200 mg Oral Daily Edelmira Ambron, DO      ascorbic acid  500 mg Oral Daily Edelmira Ambron, DO      cefepime  2,000 mg Intravenous Q12H Edelmira Ambron, DO 2,000 mg (05/09/22 2114)    fluticasone  2 spray Nasal Daily Edelmira Ambron, DO      fluticasone-vilanterol  1 puff Inhalation Daily Edelmira Ambron, DO      gabapentin  100 mg Oral BID Edelmira Ambron, DO      insulin lispro  1-5 Units Subcutaneous TID AC Edelmira Ambron, DO      insulin lispro  1-5 Units Subcutaneous HS Edelmira Ambron, DO      ipratropium  0 25 mg Nebulization BID Edelmira Ambron, DO      levalbuterol  0 63 mg Nebulization Q8H PRN Edelmira Ambron, DO      LORazepam  0 5 mg Oral Q6H PRN Edelmira Ambron, DO      magnesium sulfate  2 g Intravenous Q12H Edelmira Ambron, DO      metoprolol tartrate  25 mg Oral Q12H [address] Edelmira Ambron, DO      Morphine Sulfate (Concentrate)  5 mg Oral Q6H PRN Dada Downing DO      multivitamin stress formula  1 tablet Oral Daily Edelmira Ambron, DO      naloxone  0 04 mg Intravenous Q1MIN PRN Edelmira Ambron, DO      ondansetron  4 mg Intravenous Q6H PRN Edelmira Ambron, DO      pantoprazole  20 mg Oral Early Morning Edelmira Ambron, DO      potassium chloride  40 mEq Oral BID Edelmira Ambron, DO      senna  1 tablet Oral HS Edelmira Ambron, DO          Today, Patient Was Seen By: Dada Downing DO

## 2022-05-10 NOTE — ASSESSMENT & PLAN NOTE
Lab Results   Component Value Date    HGBA1C 6 2 (H) 05/09/2022       Recent Labs     05/09/22  1145 05/09/22  1602 05/09/22  2038 05/10/22  0725   POCGLU 141* 218* 152* 110       Blood Sugar Average: Last 72 hrs:  (P) 155 25   Hold metformin  Continue insulin sliding scale

## 2022-05-10 NOTE — ASSESSMENT & PLAN NOTE
Due to copd  Uses 3 liters of oxygen continuously   Currently using 4 liters  No exacerbation of copd  Continue breo and bronchodilators  Possible pna  Repeat cxr     Also check pro bnp due to svt/sinus tach and approx 5 liters of fluids yesterday   Wean oxygen as tolerated

## 2022-05-10 NOTE — ASSESSMENT & PLAN NOTE
Due to chronic pain from multiple myeloma  Pt is on 15mg of MS contin bid with morphine po 10 mg q6 hours  She had increased lethargy yesterday along with hypotension in which morphine dose was reduced  Currently on 5mg morphine po q6 hours     Will need to increase morphine slowly to avoid hypotension and but also avoid withdrawal

## 2022-05-10 NOTE — PLAN OF CARE
Problem: MOBILITY - ADULT  Goal: Maintain or return to baseline ADL function  Description: INTERVENTIONS:  -  Assess patient's ability to carry out ADLs; assess patient's baseline for ADL function and identify physical deficits which impact ability to perform ADLs (bathing, care of mouth/teeth, toileting, grooming, dressing, etc )  - Assess/evaluate cause of self-care deficits   - Assess range of motion  - Assess patient's mobility; develop plan if impaired  - Assess patient's need for assistive devices and provide as appropriate  - Encourage maximum independence but intervene and supervise when necessary  - Involve family in performance of ADLs  - Assess for home care needs following discharge   - Consider OT consult to assist with ADL evaluation and planning for discharge  - Provide patient education as appropriate  Outcome: Progressing  Goal: Maintains/Returns to pre admission functional level  Description: INTERVENTIONS:  - Perform BMAT or MOVE assessment daily    - Set and communicate daily mobility goal to care team and patient/family/caregiver     - Collaborate with rehabilitation services on mobility goals if consulted  - Out of bed for toileting  - Record patient progress and toleration of activity level   Outcome: Progressing     Problem: Prexisting or High Potential for Compromised Skin Integrity  Goal: Skin integrity is maintained or improved  Description: INTERVENTIONS:  - Identify patients at risk for skin breakdown  - Assess and monitor skin integrity  - Assess and monitor nutrition and hydration status  - Monitor labs   - Assess for incontinence   - Turn and reposition patient  - Assist with mobility/ambulation  - Relieve pressure over bony prominences  - Avoid friction and shearing  - Provide appropriate hygiene as needed including keeping skin clean and dry  - Evaluate need for skin moisturizer/barrier cream  - Collaborate with interdisciplinary team   - Patient/family teaching  - Consider wound care consult   Outcome: Progressing     Problem: Potential for Falls  Goal: Patient will remain free of falls  Description: INTERVENTIONS:  - Educate patient/family on patient safety including physical limitations  - Instruct patient to call for assistance with activity   - Consult OT/PT to assist with strengthening/mobility   - Keep Call bell within reach  - Keep bed low and locked with side rails adjusted as appropriate  - Keep care items and personal belongings within reach  - Initiate and maintain comfort rounds  - Make Fall Risk Sign visible to staff  - Apply yellow socks and bracelet for high fall risk patients  - Consider moving patient to room near nurses station  Outcome: Progressing     Problem: PAIN - ADULT  Goal: Verbalizes/displays adequate comfort level or baseline comfort level  Description: Interventions:  - Encourage patient to monitor pain and request assistance  - Assess pain using appropriate pain scale  - Administer analgesics based on type and severity of pain and evaluate response  - Implement non-pharmacological measures as appropriate and evaluate response  - Consider cultural and social influences on pain and pain management  - Notify physician/advanced practitioner if interventions unsuccessful or patient reports new pain  Outcome: Progressing     Problem: INFECTION - ADULT  Goal: Absence or prevention of progression during hospitalization  Description: INTERVENTIONS:  - Assess and monitor for signs and symptoms of infection  - Monitor lab/diagnostic results  - Monitor all insertion sites, i e  indwelling lines, tubes, and drains  - Monitor endotracheal if appropriate and nasal secretions for changes in amount and color  - Thornton appropriate cooling/warming therapies per order  - Administer medications as ordered  - Instruct and encourage patient and family to use good hand hygiene technique  - Identify and instruct in appropriate isolation precautions for identified infection/condition  Outcome: Progressing  Goal: Absence of fever/infection during neutropenic period  Description: INTERVENTIONS:  - Monitor WBC    Outcome: Progressing     Problem: SAFETY ADULT  Goal: Maintain or return to baseline ADL function  Description: INTERVENTIONS:  -  Assess patient's ability to carry out ADLs; assess patient's baseline for ADL function and identify physical deficits which impact ability to perform ADLs (bathing, care of mouth/teeth, toileting, grooming, dressing, etc )  - Assess/evaluate cause of self-care deficits   - Assess range of motion  - Assess patient's mobility; develop plan if impaired  - Assess patient's need for assistive devices and provide as appropriate  - Encourage maximum independence but intervene and supervise when necessary  - Involve family in performance of ADLs  - Assess for home care needs following discharge   - Consider OT consult to assist with ADL evaluation and planning for discharge  - Provide patient education as appropriate  Outcome: Progressing  Goal: Maintains/Returns to pre admission functional level  Description: INTERVENTIONS:  - Perform BMAT or MOVE assessment daily    - Set and communicate daily mobility goal to care team and patient/family/caregiver     - Collaborate with rehabilitation services on mobility goals if consulted  - Out of bed for toileting  - Record patient progress and toleration of activity level   Outcome: Progressing  Goal: Patient will remain free of falls  Description: INTERVENTIONS:  - Educate patient/family on patient safety including physical limitations  - Instruct patient to call for assistance with activity   - Consult OT/PT to assist with strengthening/mobility   - Keep Call bell within reach  - Keep bed low and locked with side rails adjusted as appropriate  - Keep care items and personal belongings within reach  - Initiate and maintain comfort rounds  - Make Fall Risk Sign visible to staff  - Apply yellow socks and bracelet for high fall risk patients  - Consider moving patient to room near nurses station  Outcome: Progressing     Problem: DISCHARGE PLANNING  Goal: Discharge to home or other facility with appropriate resources  Description: INTERVENTIONS:  - Identify barriers to discharge w/patient and caregiver  - Arrange for needed discharge resources and transportation as appropriate  - Identify discharge learning needs (meds, wound care, etc )  - Arrange for interpretive services to assist at discharge as needed  - Refer to Case Management Department for coordinating discharge planning if the patient needs post-hospital services based on physician/advanced practitioner order or complex needs related to functional status, cognitive ability, or social support system  Outcome: Progressing     Problem: Knowledge Deficit  Goal: Patient/family/caregiver demonstrates understanding of disease process, treatment plan, medications, and discharge instructions  Description: Complete learning assessment and assess knowledge base    Interventions:  - Provide teaching at level of understanding  - Provide teaching via preferred learning methods  Outcome: Progressing

## 2022-05-10 NOTE — SPEECH THERAPY NOTE
Speech Language/Pathology    Speech/Language Pathology Progress Note    Patient Name: Deidra Soria  ABGJB'Y Date: 5/10/2022     Problem List  Principal Problem:    Sepsis ([place] [address] )  Active Problems:    Multiple myeloma ([place] [address] )    GERD (gastroesophageal reflux disease)    Thrombocytopenia (HCC)    Ambulatory dysfunction    MIK (acute kidney injury) ([place] [address] )    SVT (supraventricular tachycardia) ([facility])    Chronic respiratory failure ([facility])    Type 2 diabetes mellitus ([facility])    Continuous opioid dependence ([place] [address] )    Acute encephalopathy    Hypokalemia       Past Medical History  Past Medical History:   Diagnosis Date    Anemia     Asthma     Chronic constipation     Chronic pain     COPD (chronic obstructive pulmonary disease) ([facility])     CTS (carpal tunnel syndrome)     DJD (degenerative joint disease)     GERD (gastroesophageal reflux disease)     Hypertension     Multiple myeloma ([facility])     SAMIR (obstructive sleep apnea)     Osteonecrosis ([place] [address] )     Sciatica     Thoracic aortic aneurysm ([facility])         Past Surgical History  Past Surgical History:   Procedure Laterality Date    BONE MARROW BIOPSY      COLONOSCOPY      PORTACATH PLACEMENT      REDUCTION MAMMAPLASTY      VERTEBROPLASTY           Subjective:  Pt alert but states she is tired  Objective:  Cxr:  IMPRESSION:  Left lung base opacity potentially reflecting pneumonia  CT ead neg acute  Pt seen briefly at late lunch  Had a small amt of the ground chicken and chopped broccoli  tolerated wnl  Stated she wasn't hungry  "just tired"   Wanted to drink more than eat  Finished a diet gingerale and asked for another  Swallows prompt w/ no overt s/s aspiration  Offered to get her something else to eat  She stated she wasn't hungry  Assessment:  Limited  No overt s/s aspiration  Prefers drinking over eating  Plan/Recommendations:  Continue dysphagia 2 mechanical soft w/ thin or now   Will f/u

## 2022-05-10 NOTE — ASSESSMENT & PLAN NOTE
Fever 101 and tachycardia  Lactic acidosis on admission that has resolved  procal 9 6 on admission and has worsened to 21 8  Chronic costello  Possible uti  Costello was changed in the ed  Given ceftriaxone but broadened to cefepime yesterday   cxr reveals questionable left lower opacity  will repeat cxr and adjust antibiotics if needed     covid was negative   Will follow up on urine cultures and blood culture

## 2022-05-10 NOTE — PROGRESS NOTES
Vancomycin Assessment    Patel Dhaliwal is a 78 y o  female who is currently receiving vancomycin 1250mg IV as a loading dose for Pneumonia    Relevant clinical data and objective history reviewed:  Creatinine   Date Value Ref Range Status   05/10/2022 0 76 0 60 - 1 30 mg/dL Final     Comment:     Standardized to IDMS reference method   05/09/2022 1 26 0 60 - 1 30 mg/dL Final     Comment:     Standardized to IDMS reference method   01/17/2019 0 68 0 60 - 1 30 mg/dL Final     Comment:     Standardized to IDMS reference method     /59 (BP Location: Right arm)   Pulse 100   Temp (!) 97 1 °F (36 2 °C) (Temporal)   Resp 16   Wt 85 9 kg (189 lb 6 oz)   SpO2 100%   BMI 36 98 kg/m²   I/O last 3 completed shifts: In: 7063 [IV PRDRXFRGB:8129]  Out: 2675 [Urine:1335]  Lab Results   Component Value Date/Time    BUN 10 05/10/2022 06:09 AM    WBC 7 57 05/10/2022 08:46 AM    HGB 6 7 (LL) 05/10/2022 08:46 AM    HCT 21 8 (L) 05/10/2022 08:46 AM     (H) 05/10/2022 08:46 AM    PLT 19 (LL) 05/10/2022 08:46 AM     Temp Readings from Last 3 Encounters:   05/10/22 (!) 97 1 °F (36 2 °C) (Temporal)   01/17/19 97 6 °F (36 4 °C) (Temporal)     Vancomycin Days of Therapy: 1    Assessment/Plan  The patient is currently on vancomycin utilizing scheduled dosing based on adjusted body weight (due to obesity)  The patient is currently receiving vancomycin 1250mg IV as a loading and will be started on vancomycin 750mg IV every 12 hours thereafter  Pharmacy will also follow closely for s/sx of nephrotoxicity, infusion reactions, and appropriateness of therapy  BMP and CBC will be ordered per protocol  Plan for trough as patient approaches steady state, prior to the 5th  dose at approximately 1145 on 5/12/22   Due to infection severity, will target a trough of 15-20  Pharmacy will continue to follow the patients culture results and clinical progress daily      Alex Fox,Pharmacist

## 2022-05-10 NOTE — PHYSICAL THERAPY NOTE
PT EVALUATION    Pt  Name: Patel Dhaliwal  Pt  Age: 78 y o  MRN: [id number]  LENGTH OF STAY: 1      Admitting Diagnoses:   Palpitations [R00 2]  UTI (urinary tract infection) [N39 0]  Tachycardia [R00 0]  Sepsis ([facility] [address] ) [A41 9]    Past Medical History:   Diagnosis Date    Anemia     Asthma     Chronic constipation     Chronic pain     COPD (chronic obstructive pulmonary disease) (HCC)     CTS (carpal tunnel syndrome)     DJD (degenerative joint disease)     GERD (gastroesophageal reflux disease)     Hypertension     Multiple myeloma (HCC)     SAMIR (obstructive sleep apnea)     Osteonecrosis ([facility] [address] )     Sciatica     Thoracic aortic aneurysm (HCC)        Past Surgical History:   Procedure Laterality Date    BONE MARROW BIOPSY      COLONOSCOPY      PORTACATH PLACEMENT      REDUCTION MAMMAPLASTY      VERTEBROPLASTY         Imaging Studies:  XR chest portable   Final Result by Frederick Jaquez MD (05/10 0064)      Left lung base opacity potentially reflecting pneumonia  Workstation performed: [id number]         CT head wo contrast   Final Result by Jose Agrawal DO (05/09 2055)   No acute intracranial abnormality  Workstation performed: [id number]         XR chest 1 view portable   ED Interpretation by Luis Thomas MD (05/09 028)   No acute cardiopulmonary disease as interpreted by myself  Final Result by Jersey Marie MD (05/09 4048)      Questionable opacity at the left lung base  Correlate with PA and lateral films or CT scan of the chest to exclude an infiltrate in this region  The study was marked in EPIC for significant notification              Workstation performed: [id number]                05/10/22 1141   PT Last Visit   PT Visit Date 05/10/22   Note Type   Note type Evaluation   Pain Assessment   Pain Assessment Tool 0-10   Pain Score No Pain   Restrictions/Precautions   Weight Bearing Precautions Per Order No   Other Precautions Cognitive; Bed Alarm;Multiple lines;O2;Fall Risk  (4L NC)   Home Living   Type of [address] One level; Laundry in basement   Home Equipment Walker;Cane   Additional Comments Pt poor historian; above information from previous PT note on 1/17/19  When asked, pt confirmed she still lives at the same house  Per [place] [facility] [place]  Prior Function   Level of [place] Independent with ADLs and functional mobility  (Per previous PT note 1/17/19)   Lives With Spouse  (Works often)   Receives Help From Family  (Per previous PT note 1/17/19)   ADL Assistance Independent  (Per previous PT note 1/17/19)   Falls in the last 6 months 1 to 4  (4 falls, per pt)   Comments Above information from previous PT note on 1/17/19  Pt poor historian but able to provide minimal information including lives w/ , was I w/ pericare & was at [facility] PTA  Pt also noted she uses her cane for household distances and RW for community distances  General   Additional Pertinent History Admitted to [facility] on 4/22/22 for worsening multiple myeloma and was D/C to [facility] PTA at [facility]   Family/Caregiver Present No   Cognition   Overall Cognitive Status Impaired   Arousal/Participation Alert   Orientation Level Oriented to person;Oriented to situation  (Oriented to general place and time)   Following Commands Follows one step commands without difficulty   Comments Pt pleasant and cooperative to PT session   Subjective   Subjective Pt states she is feeling okay today   RUE Assessment   RUE Assessment WFL  (Decreased shoulder flexion ROM; 3+/5 grossly)   LUE Assessment   LUE Assessment WFL  (Decreased shoulder flexion ROM; 3+/5 grossly)   RLE Assessment   RLE Assessment WFL  (4/5 grossly)   LLE Assessment   LLE Assessment WFL  (4/5 grossly)   Coordination   Sensation WFL   Bed Mobility   Rolling R 3  Moderate assistance   Additional items Assist x 1;Bedrails; Increased time required;Verbal cues   Rolling L 3  Moderate assistance   Additional items Assist x 1;Bedrails;Verbal cues   Supine to Sit 3  Moderate assistance   Additional items Assist x 1;HOB elevated; Increased time required;Verbal cues;LE management   Sit to Supine 3  Moderate assistance   Additional items Assist x 1; Increased time required;Verbal cues;LE management   Additional Comments cues for log rolling techniques   Transfers   Sit to Stand 3  Moderate assistance   Additional items Assist x 1; Increased time required;Verbal cues  (w/ RW)   Stand to Sit 3  Moderate assistance   Additional items Assist x 1;Verbal cues  (w/ RW)   Additional Comments VCs for hand placement   Ambulation/Elevation   Gait pattern Short stride; Excessively slow;Decreased heel strike   Gait Assistance 3  Moderate assist   Additional items Assist x 1;Verbal cues   Assistive Device Rolling walker   Distance 3'x1   Ambulation/Elevation Additional Comments Approx 5 lateral steps towards HOB   Balance   Static Sitting Fair   Dynamic Sitting Fair -   Static Standing Poor +   Dynamic Standing Poor   Ambulatory Poor   Endurance Deficit   Endurance Deficit Yes   Endurance Deficit Description Pt requested to sit after lateral stepping 2* to fatgue   Activity Tolerance   Activity Tolerance Patient limited by fatigue   Nurse Made Aware JOSLYN Armendariz   Assessment   Prognosis Fair   Problem List Decreased strength;Decreased endurance; Impaired balance;Decreased mobility; Decreased cognition; Impaired judgement;Decreased safety awareness   Assessment Pt is a 77 y/o female [place] [facility] [place] with symptoms of tachycardia ( bpm)  Pt admitted with sepsis 2* to possible UTI and PNA, supra ventricular tachycardia, MIK, ambulatory dysfunction, chronic respiratory failure 2* to COPD, acute encephalopathy, and hypokalemia   Of note, pt was recently admitted at [facility] on 4/22/22 2* to recurrence of multiple myeloma then was D/C to [facility] for STR  Pt's PMH that may impact PT session includes continuous opioid dependence, T2DM, GERD, and thrombocytopenia  Pt referred to PT for mobility assessment and discharge planning  On eval, patient demonstrated modAx1 with bed mobility, transfers, and amb  Pt performed sit<>stand at EOB, and took aprox 5 lateral side steps towards HOB w/ RW and modAx1  Pt requested to sit following lateral steps 2* to fatigue, stating "she can't do it anymore"  Pt's SpO2 was 97% on 4L NC and BP was 103/100 following functional mobility  Pt denied SOB, dizziness, or pain throughout session  The patient's AM-PAC Basic Mobility Inpatient Short Form Raw Score is 11  A Raw score of less than or equal to 16 suggests the patient may benefit from discharge to post-acute rehabilitation services  From PT standpoint, pt would benefit from continued STR at D/C to achieve highest level of function prior to return home  Will continue PT per POC  At end of session, patient stable and supine w/ all lines intact, needs within reach, and bed alarm activated  Patient educated to call nsg staff to assist with OOB activities  Nsg notified  Barriers to Discharge Inaccessible home environment;Decreased caregiver support   Goals   Patient Goals none stated at this time   STG Expiration Date 05/24/22   Short Term Goal #1 Goals to be met in 14 days: patient able to: 1  Increased MMT of 1/2 grade grossly to improve overall functional mobility; 2  Improved balance by 1/2 grade grossly to improve safety and independence; 3  Improved bed mobility to mod I to progress towards PLOF; 4  Improved transfer ability to S to improve overall function and independence; 5  Increase ambulation with RW to approximately 100 ft with minAx1 assistance to increase endurance and overall strength; 6  Improve stair negotiation to minAx1 assistance to improve function progress towards PLOF; 7   Patient/caregiver education    PT Treatment Day 0   Plan Treatment/Interventions Functional transfer training;Elevations;LE strengthening/ROM; Therapeutic exercise; Endurance training;Patient/family training;Bed mobility;Gait training;Spoke to nursing   PT Frequency 3-5x/wk   Recommendation   PT Discharge Recommendation Post acute rehabilitation services  (Return to [facility])   [phone]   Turning in Bed Without Bedrails 2   Lying on Back to Sitting on Edge of Flat Bed 2   Moving Bed to Chair 2   Standing Up From Chair 2   Walk in Room 2   Climb 3-5 Stairs 1   Basic Mobility Inpatient Raw Score 11   Basic Mobility Standardized Score 30 25   Highest Level Of Mobility   -[facility] Goal 4: Move to chair/commode   -[facility] Highest Level of Mobility 5: Stand (1 or more minutes)   -[facility] Goal Achieved Yes   End of Consult   Patient Position at End of Consult Supine;Bed/Chair alarm activated; All needs within reach   End of Consult Comments pt supine w/ bed alarm activated, all lines intact, and needs within reach at end of session     Hx/personal factors: co-morbidities, inaccessible home, mutliple lines, use of AD, dec cognition, fall risk and O2  Examination: dec mobility, dec balance, dec endurance, dec amb, risk for falls, dec cognition  Clinical: unpredictable (ongoing medical status, abnormal lab values and risk for falls)  Complexity: high     Bosie Platts, SPT

## 2022-05-11 LAB
ABO GROUP BLD BPU: NORMAL
ANION GAP SERPL CALCULATED.3IONS-SCNC: 8 MMOL/L (ref 4–13)
BPU ID: NORMAL
BUN SERPL-MCNC: 8 MG/DL (ref 5–25)
CALCIUM SERPL-MCNC: 9.1 MG/DL (ref 8.3–10.1)
CHLORIDE SERPL-SCNC: 112 MMOL/L (ref 100–108)
CO2 SERPL-SCNC: 25 MMOL/L (ref 21–32)
CREAT SERPL-MCNC: 0.8 MG/DL (ref 0.6–1.3)
CROSSMATCH: NORMAL
ERYTHROCYTE [DISTWIDTH] IN BLOOD BY AUTOMATED COUNT: 21.1 % (ref 11.6–15.1)
GFR SERPL CREATININE-BSD FRML MDRD: 76 ML/MIN/1.73SQ M
GLUCOSE SERPL-MCNC: 100 MG/DL (ref 65–140)
GLUCOSE SERPL-MCNC: 107 MG/DL (ref 65–140)
GLUCOSE SERPL-MCNC: 115 MG/DL (ref 65–140)
GLUCOSE SERPL-MCNC: 169 MG/DL (ref 65–140)
GLUCOSE SERPL-MCNC: 93 MG/DL (ref 65–140)
HCT VFR BLD AUTO: 23 % (ref 34.8–46.1)
HGB BLD-MCNC: 7.3 G/DL (ref 11.5–15.4)
MAGNESIUM SERPL-MCNC: 2 MG/DL (ref 1.6–2.6)
MCH RBC QN AUTO: 31.5 PG (ref 26.8–34.3)
MCHC RBC AUTO-ENTMCNC: 31.7 G/DL (ref 31.4–37.4)
MCV RBC AUTO: 99 FL (ref 82–98)
MRSA NOSE QL CULT: NORMAL
PLATELET # BLD AUTO: 20 THOUSANDS/UL (ref 149–390)
PMV BLD AUTO: 11.1 FL (ref 8.9–12.7)
POTASSIUM SERPL-SCNC: 3 MMOL/L (ref 3.5–5.3)
PROCALCITONIN SERPL-MCNC: 12.68 NG/ML
RBC # BLD AUTO: 2.32 MILLION/UL (ref 3.81–5.12)
SODIUM SERPL-SCNC: 145 MMOL/L (ref 136–145)
UNIT DISPENSE STATUS: NORMAL
UNIT PRODUCT CODE: NORMAL
UNIT PRODUCT VOLUME: 350 ML
UNIT RH: NORMAL
WBC # BLD AUTO: 6.25 THOUSAND/UL (ref 4.31–10.16)

## 2022-05-11 PROCEDURE — 94640 AIRWAY INHALATION TREATMENT: CPT

## 2022-05-11 PROCEDURE — 99222 1ST HOSP IP/OBS MODERATE 55: CPT | Performed by: INTERNAL MEDICINE

## 2022-05-11 PROCEDURE — 82948 REAGENT STRIP/BLOOD GLUCOSE: CPT

## 2022-05-11 PROCEDURE — 84145 PROCALCITONIN (PCT): CPT | Performed by: INTERNAL MEDICINE

## 2022-05-11 PROCEDURE — 94760 N-INVAS EAR/PLS OXIMETRY 1: CPT

## 2022-05-11 PROCEDURE — 85027 COMPLETE CBC AUTOMATED: CPT | Performed by: INTERNAL MEDICINE

## 2022-05-11 PROCEDURE — 83735 ASSAY OF MAGNESIUM: CPT | Performed by: PHYSICIAN ASSISTANT

## 2022-05-11 PROCEDURE — 97167 OT EVAL HIGH COMPLEX 60 MIN: CPT

## 2022-05-11 PROCEDURE — 99232 SBSQ HOSP IP/OBS MODERATE 35: CPT | Performed by: PHYSICIAN ASSISTANT

## 2022-05-11 PROCEDURE — 80048 BASIC METABOLIC PNL TOTAL CA: CPT | Performed by: INTERNAL MEDICINE

## 2022-05-11 RX ORDER — LOPERAMIDE HYDROCHLORIDE 2 MG/1
2 CAPSULE ORAL ONCE
Status: COMPLETED | OUTPATIENT
Start: 2022-05-11 | End: 2022-05-11

## 2022-05-11 RX ORDER — METOPROLOL TARTRATE 50 MG/1
50 TABLET, FILM COATED ORAL EVERY 12 HOURS SCHEDULED
Status: DISCONTINUED | OUTPATIENT
Start: 2022-05-11 | End: 2022-05-12

## 2022-05-11 RX ORDER — POTASSIUM CHLORIDE 20 MEQ/1
40 TABLET, EXTENDED RELEASE ORAL ONCE
Status: COMPLETED | OUTPATIENT
Start: 2022-05-11 | End: 2022-05-11

## 2022-05-11 RX ADMIN — MORPHINE SULFATE 15 MG: 15 TABLET, EXTENDED RELEASE ORAL at 08:14

## 2022-05-11 RX ADMIN — CEFEPIME HYDROCHLORIDE 2000 MG: 2 INJECTION, POWDER, FOR SOLUTION INTRAVENOUS at 21:43

## 2022-05-11 RX ADMIN — CEFEPIME HYDROCHLORIDE 2000 MG: 2 INJECTION, POWDER, FOR SOLUTION INTRAVENOUS at 10:26

## 2022-05-11 RX ADMIN — FLUTICASONE FUROATE AND VILANTEROL TRIFENATATE 1 PUFF: 100; 25 POWDER RESPIRATORY (INHALATION) at 08:16

## 2022-05-11 RX ADMIN — POTASSIUM CHLORIDE 40 MEQ: 20 TABLET, EXTENDED RELEASE ORAL at 08:15

## 2022-05-11 RX ADMIN — ALLOPURINOL 200 MG: 100 TABLET ORAL at 08:14

## 2022-05-11 RX ADMIN — METOPROLOL TARTRATE 50 MG: 50 TABLET, FILM COATED ORAL at 21:43

## 2022-05-11 RX ADMIN — ACYCLOVIR 400 MG: 200 CAPSULE ORAL at 14:16

## 2022-05-11 RX ADMIN — POTASSIUM CHLORIDE 40 MEQ: 20 TABLET, EXTENDED RELEASE ORAL at 08:19

## 2022-05-11 RX ADMIN — ACYCLOVIR 400 MG: 200 CAPSULE ORAL at 21:43

## 2022-05-11 RX ADMIN — GABAPENTIN 100 MG: 100 CAPSULE ORAL at 08:14

## 2022-05-11 RX ADMIN — PANTOPRAZOLE SODIUM 20 MG: 20 TABLET, DELAYED RELEASE ORAL at 06:13

## 2022-05-11 RX ADMIN — MORPHINE SULFATE 15 MG: 15 TABLET, EXTENDED RELEASE ORAL at 21:43

## 2022-05-11 RX ADMIN — ACYCLOVIR 400 MG: 200 CAPSULE ORAL at 10:28

## 2022-05-11 RX ADMIN — GABAPENTIN 100 MG: 100 CAPSULE ORAL at 17:26

## 2022-05-11 RX ADMIN — METOPROLOL TARTRATE 25 MG: 25 TABLET, FILM COATED ORAL at 08:16

## 2022-05-11 RX ADMIN — IPRATROPIUM BROMIDE 0.25 MG: 0.5 SOLUTION RESPIRATORY (INHALATION) at 19:34

## 2022-05-11 RX ADMIN — ACYCLOVIR 400 MG: 200 CAPSULE ORAL at 17:27

## 2022-05-11 RX ADMIN — B-COMPLEX W/ C & FOLIC ACID TAB 1 TABLET: TAB at 08:14

## 2022-05-11 RX ADMIN — POTASSIUM CHLORIDE 40 MEQ: 20 TABLET, EXTENDED RELEASE ORAL at 17:26

## 2022-05-11 RX ADMIN — ACYCLOVIR 400 MG: 200 CAPSULE ORAL at 06:12

## 2022-05-11 RX ADMIN — INSULIN LISPRO 1 UNITS: 100 INJECTION, SOLUTION INTRAVENOUS; SUBCUTANEOUS at 11:36

## 2022-05-11 RX ADMIN — VANCOMYCIN HYDROCHLORIDE 750 MG: 750 INJECTION, SOLUTION INTRAVENOUS at 11:36

## 2022-05-11 RX ADMIN — IPRATROPIUM BROMIDE 0.25 MG: 0.5 SOLUTION RESPIRATORY (INHALATION) at 07:30

## 2022-05-11 RX ADMIN — FLUTICASONE PROPIONATE 2 SPRAY: 50 SPRAY, METERED NASAL at 09:31

## 2022-05-11 RX ADMIN — OXYCODONE HYDROCHLORIDE AND ACETAMINOPHEN 500 MG: 500 TABLET ORAL at 08:15

## 2022-05-11 RX ADMIN — LOPERAMIDE HYDROCHLORIDE 2 MG: 2 CAPSULE ORAL at 21:43

## 2022-05-11 NOTE — PLAN OF CARE
Problem: OCCUPATIONAL THERAPY ADULT  Goal: Performs self-care activities at highest level of function for planned discharge setting  See evaluation for individualized goals  Description: Treatment Interventions: ADL retraining, Functional transfer training, UE strengthening/ROM, Endurance training, Patient/family training, Equipment evaluation/education, Compensatory technique education, Continued evaluation, Energy conservation, Activityengagement          See flowsheet documentation for full assessment, interventions and recommendations  Note: Limitation: Decreased ADL status, Decreased UE strength, Decreased Safe judgement during ADL, Decreased cognition, Decreased endurance, Decreased self-care trans, Decreased high-level ADLs  Prognosis: Fair  Assessment: Pt is a 78 y o  female seen for OT evaluation s/p adm to [facility] on 5/9/2022 w/ fever and tachycardia  Pt admitted w/ Sepsis, Hypokalemia, Acute encephalopathy, SVT, MIK, and Ambulatory dysfunction  Comorbidities affecting pts functional performance include a significant PMH of Anemia, Asthma, COPD, CTS, DJD, HTN, Multiple Myeloma, Sciatica, and Thoracic aortic aneurysm  Pt with active OT orders and activity orders for Up with assistance  Pt is a poor historian, info on home setup and PLOF obtained via pt and pt's chart  Pt presents from [address] where she was receiving STR  At rehab, pt required assist w/ ADLs, IADLs, and functional transfers/mobility w/ use of RW  Upon evaluation, pt currently requires Min A for UB ADLs, Mod A for LB ADLs, Mod A for toileting, Mod A for bed mobility, and Mod A for functional mobility/transfers 2* the following deficits impacting occupational performance: decreased ROM, decreased strength, decreased balance, decreased tolerance, impaired initiation, impaired problem solving and decreased safety awareness   These impairments, as well at pts fall risk, multiple lines, difficulty performing ADLS, limited insight into deficits and decreased initiation and engagement  limit pts ability to safely engage in all baseline areas of occupation  Pt to continue to benefit from continued acute OT services during hospital stay to address defined deficits and to maximize level of functional independence in the following Occupational Performance areas: grooming, bathing/shower, toilet hygiene, dressing, medication management, health maintenance, functional mobility, community mobility, clothing management and social participation  From OT standpoint, recommend STR upon D/C   OT will continue to follow pt 3-5x/wk to address the following goals to  w/in 10-14 days:     OT Discharge Recommendation: Post acute rehabilitation services  OT - OK to Discharge: Yes (when medically cleared to rehab)

## 2022-05-11 NOTE — ASSESSMENT & PLAN NOTE
· Known history of multiple myeloma with thrombocytopenia and anemia requiring blood transfusion yesterday  · Blood counts appear more stable this morning  · Continue outpatient Hematology-Oncology follow-up

## 2022-05-11 NOTE — ASSESSMENT & PLAN NOTE
· Patient maintained on 3 L nasal cannula oxygen due to history of COPD  · Initially was requiring 4 L nasal cannula in the emergency room but able to be weaned down to her home 3 L nasal cannula today  · Continue home inhalers and nebulizers

## 2022-05-11 NOTE — ASSESSMENT & PLAN NOTE
· Seen in consult by Physical and Occupational therapy recommended rehab placement  · Plan for discharge back to [address] [place] [address],[address] at discharge

## 2022-05-11 NOTE — ASSESSMENT & PLAN NOTE
· Potassium 2 5 at time of admission with magnesium 0 8  · Both have been repleted throughout hospitalization  · Potassium improved to 3 0 this morning and magnesium 2 0  · Continue potassium supplement 40 mg b i d  With additional dose this morning  · Recheck in a m

## 2022-05-11 NOTE — NURSING NOTE
Patient vomited small amount in basin, blue pill with 15 clearly visualized in basin and identified as MS contin 15 mg  Tablet grossly intact  Discussed with on call provider Esdras WRIGHT and due to patient likely starting to withdrawal from opiods, will readminister dose  Waste documented in [place]  Add   Patient does not want repeat dose, will return MS contin and medicate with PRN morphine per patient request

## 2022-05-11 NOTE — CONSULTS
Consultation - Yeyo Brady  78 y o   female  [address] [address] /E4 MS 12-*   MRN: [id number]  Encounter: [id number]    ASSESSMENT:    Patient Active Problem List   Diagnosis    Multifocal pneumonia    [facility]oma ([facility] )    Hypertension    GERD (gastroesophageal reflux disease)    Thrombocytopenia (HCC)    Asthma    Pressure injury of right buttock, unstageable (HCC)    Pressure injury of left buttock, stage 2 ([facility] )    Ambulatory dysfunction    MIK (acute kidney injury) ([facility] )    SVT (supraventricular tachycardia) (HCC)    Chronic respiratory failure (HCC)    Type 2 diabetes mellitus ([facility] )    Sepsis (HCC)    Continuous opioid dependence ([facility] )    Acute encephalopathy    Hypokalemia       Active problems addressed:  Multiple myeloma, not having achieved remission  Cancer related pain  Encephalopathy  Sepsis  UTI  Goals of care    Consult is for symptom management    PLAN:    1  Symptom management:   Given patient's long term use of MSER and MSIR, she is at high risk for opioid withdrawal with further de-escalation of opioids  I wouldn't recommend further de-escalation unless medically necessary as deemed by primary team  Typically, recommendation is to wean no more than 5-20% reduction from overall MEDD if needed over the course of 2-4 weeks to avoid withdrawal symptoms   If further/rapid de-escalation is needed, recommend consulting toxicology for assistance, if possible   Continue MS ER 15mg BID and MSIR 5mg SL q6H prn  She uses MSIR 10mg SL at home AS NEEDED, and she only uses it sparingly   Patient will follow up with [facility] oncology and palliative care for continued opioid management   Opioid withdrawal management per primary team   Palliative care will follow along peripherally, please reach out if further assistance is needed      Patient does not need script for MSER and MSIR, just filled script on 5/4    D/w Dr Marcella Tenorio Review    [place] or [place]  reviewed: No red flags were identified; safe to proceed with prescription  Noni Clark 05/04/2022  2   05/03/2022  Morphine Sulf Er 15 MG Tablet    60 00  15 Ka Pat   [id number]   Hea ([id number])    60 00 MME  [place]   05/04/2022  2   05/03/2022  Morphine Sulf 100 Mg/5 Ml Conc    30 00  15 Ka Pat   [id number]   Hea ([id number])    40 00 MME  [place] Pay   [place]   03/21/2022  1   03/09/2022  Morphine Sulf Er 30 MG Tablet    60 00  30 Br Pat   [id number]   Pen (1007)    60 00 MME  [place]   02/20/2022  1   12/28/2021  Morphine Sulf Er 30 MG Tablet    60 00  30 La Dem   [id number]   Pen (1007)    60 00 MME  [place]   01/13/2022  1   12/15/2021  Morphine Sulf Er 30 MG Tablet    60 00  30 La Dem   [id number]   Pen (1007)    60 00 MME  [place]   11/22/2021  1   09/29/2021  Morphine Sulf Er 30 MG Tablet    60 00  30 La Dem   [id number]   Pen (1007)    60 00 MME  [place]   11/22/2021  1   09/29/2021  Morphine Sulfate Ir 15 MG Tab    90 00  15 La Dem   [id number]   Pen (1007)    90 00 MME  [place]are   [place]     I have reviewed the patient's controlled substance dispensing history in the Prescription Drug Monitoring Program in compliance with the [facility] regulations before prescribing any controlled substances  2  Goals:    She is treatment-focused, wants to make it back to her chemotherapy    Code status: Level 1 - Full Code   Decisional apparatus:  Patient does have capacity to make medical decisions on my exam today  If such capacity is lost, patient's substitute decision maker would default to  and daughter by [place] Act 169  Advance Directive / Living Will / POLST:  None on file    3  Support system:  · She lives with her   She has 3 biological children from a previous relationship  She and her current  do not have any children together  She has 9 grandchildren  Family appears supportive  We appreciate the opportunity to participate in this patient's care   We will continue to follow  Please do not hesitate to contact our on-call provider through our clinic answering service at [phone] should you have acute symptom control concerns  IDENTIFICATION:  Inpatient consult to Palliative Care  Consult performed by: Melba Singh MD  Consult ordered by: Ysabel Cervantes MD        Reason for Consult / Principal Problem: symptom management    HISTORY OF PRESENT ILLNESS:    Shannen Rashid is a 78 y o  female with a palliative diagnosis of multiple myeloma requiring transfusions as needed  She is currently admitted for sepsis from UTI and receiving appropriate antibiotics  Of note, she is on chronic opioids - MSER and MSIR - for cancer related pain, managed by her [facility] team (oncology/palliative care)  On 5/10, patient was noted to be encephalopathic/lethargic thought due to narcotics + sepsis and was given narcan  Later on, there was a question about withdrawal when patient started to have abdominal pain, nausea, vomiting  Per note, appears much better today  [facility] consulted to help manage her cancer related pain  Of note, she was just at [facility] from 4/22 to 5/3 for management of MIK, hypercalcemia and acute metabolic encephalopathy  On review of notes, hospice as discussed with family who agreed  However, hospice was rescinded when patient's mental status improved and she chose to continue with cancer treatments  Met with patient who was AAO x 3  She was slow to respond but surprisingly answering questions appropriately  She said she is taking MSER 15mg am and pm at home  She also has 10mg of MSIR prn as well which she was adamant she only takes very sparingly  She does not recall the events of yesterday  I told her she appeared very lethargic and was given narcan  She did admit to having lots of pain yesterday, but that today it was a whole lot better   We explained the strategy for cancer related pain management while she is here and we will decrease her prn to 5mg in an effort to avoid oversedation  She will be continued on her MSER as is  Her goal is to go back to chemo with her [facility] oncologist  She said she is not on treatments right now, but that she was and they are on hold until she gets better  She lives with her   She has 3 biological children from a previous relationship  She and her current  do not have any children together  She has 9 grandchildren  Family appears supportive  Interview and exam limited by: none  Review of Systems   Constitutional: Positive for fatigue  Negative for activity change and appetite change  HENT: Negative for trouble swallowing  Respiratory: Negative for shortness of breath  Cardiovascular: Negative for chest pain  Gastrointestinal: Negative for abdominal pain, constipation, diarrhea, nausea and vomiting  Musculoskeletal: Positive for arthralgias and myalgias  Neurological: Negative for weakness  Psychiatric/Behavioral: Negative for sleep disturbance  The patient is not nervous/anxious  All other systems reviewed and are negative        Past Medical History:   Diagnosis Date    Anemia     Asthma     Chronic constipation     Chronic pain     COPD (chronic obstructive pulmonary disease) (HCC)     CTS (carpal tunnel syndrome)     DJD (degenerative joint disease)     GERD (gastroesophageal reflux disease)     Hypertension     Multiple myeloma (HCC)     SAMIR (obstructive sleep apnea)     Osteonecrosis (HCC)     Sciatica     Thoracic aortic aneurysm (HCC)      Past Surgical History:   Procedure Laterality Date    BONE MARROW BIOPSY      COLONOSCOPY      PORTACATH PLACEMENT      REDUCTION MAMMAPLASTY      VERTEBROPLASTY       Social History     Socioeconomic History    Marital status: /Civil Union     Spouse name: Not on file    Number of children: Not on file    Years of education: Not on file    Highest education level: Not on file   Occupational History    Not on file   Tobacco Use  Smoking status: Former Smoker    Smokeless tobacco: Never Used   Substance and Sexual Activity    Alcohol use: No    Drug use: No    Sexual activity: Not on file   Other Topics Concern    Not on file   Social History Narrative    Not on file     Social Determinants of Health     Financial Resource Strain: Not on file   Food Insecurity: Not on file   Transportation Needs: Not on file   Physical Activity: Not on file   Stress: Not on file   Social Connections: Not on file   Intimate Partner Violence: Not on file   Housing Stability: Not on file     History reviewed  No pertinent family history  MEDICATIONS / ALLERGIES:  all current active meds have been reviewed    Allergies   Allergen Reactions    Aspirin     Lisinopril Angioedema    Nsaids      Avoid due to renal protection    Penicillins        OBJECTIVE:  /78 (BP Location: Right arm)   Pulse 85   Temp 97 6 °F (36 4 °C) (Temporal)   Resp 18   Ht 5' (1 524 m)   Wt 85 9 kg (189 lb 6 oz)   SpO2 95%   BMI 36 98 kg/m²   Physical Exam:  Constitutional: Appears well-developed and well-nourished  Appears chronically ill but not toxic appearing  In no acute physical or emotional distress  Head: Normocephalic and atraumatic  Eyes: EOM are normal  No ocular discharge  No scleral icterus  Neck: No visible adenopathy or masses  Respiratory: Effort normal  No stridor  No respiratory distress  Gastrointestinal: No abdominal distension  Musculoskeletal: No edema  Neurological: Alert, oriented and appropriately conversant  Slow to respond but answering appropriately  Skin: Dry, no diaphoresis  Psychiatric: Displays a normal mood and affect  Behavior, judgment and thought content appear normal      Lab Results: I have personally reviewed pertinent labs  Imaging Studies: I have personally reviewed pertinent reports  EKG, Pathology, and Other Studies: I have personally reviewed pertinent reports        Counseling / Coordination of Care:  Counseling / Coordination of Care  Total floor / unit time spent today 30 minutes  Greater than 50% of total time was spent with the patient and / or family counseling and / or coordination of care  A description of the counseling / coordination of care: provided medical updates, discussed palliative care, discussed hospice care, determined competency, determined goals of care, determined POA, determined social/family support, discussed plans of care, discussed symptom management, provided psychosocial support       Marcos Vargas MD  [address] and Supportive Care

## 2022-05-11 NOTE — PROGRESS NOTES
[address] [facility]  Progress Note - Gay Olives 1955, 78 y o  female MRN: [id number]  Unit/Bed#: E4 -01 Encounter: [id number]  Primary Care Provider: No primary care provider on file  Date and time admitted to hospital: 5/9/2022  5:42 AM    * Sepsis [facility])  Assessment & Plan  · Patient met sepsis criteria time of presentation with fever, tachycardia and leukocytosis  · Procalcitonin found to be elevated, slightly improved to 12 this morning  · Likely due to UTI given abnormal UA-pending sensitivity, urine culture growing E coli and Enterococcus  · Chest x-ray with evidence of possible left lower lobe opacity  · Continue cefepime and vancomycin  · MRSA culture pending, will discontinue vanco if negative  · Patient has been afebrile times 24 hours  · Blood culture negative at 48 hours      Hypokalemia  Assessment & Plan  · Potassium 2 5 at time of admission with magnesium 0 8  · Both have been repleted throughout hospitalization  · Potassium improved to 3 0 this morning and magnesium 2 0  · Continue potassium supplement 40 mg b i d  With additional dose this morning  · Recheck in a m  Acute encephalopathy  Assessment & Plan  · Resolved  · Likely secondary to sepsis  · Patient alert and oriented today    Continuous opioid dependence ([facility] )  Assessment & Plan  · Patient with chronic pain secondary to multiple myeloma  · Home regimen 15 mg MS Contin b i d  And morphine 10 mg p o  Q 6 hours p r n  · Given acute encephalopathy, doses were decreased  · Patient currently on 15 mg MS Contin b i d  And morphine 5 mg q 6 hours p r n    · Did have some symptoms of nausea and diarrhea yesterday, question withdrawal symptoms, doing better today  · Palliative care consulted        Type 2 diabetes mellitus [facility])  Assessment & Plan  Lab Results   Component Value Date    HGBA1C 6 2 (H) 05/09/2022       Recent Labs     05/10/22  1600 05/10/22  2034 05/11/22  0756 05/11/22  1116   POCGLU 127 131 107 169*       Blood Sugar Average: Last 72 hrs:  (P) 360 [id number]   · Hold oral metformin while inpatient  · Sliding scale insulin coverage with [facility]    Chronic respiratory failure (HCC)  Assessment & Plan  · Patient maintained on 3 L nasal cannula oxygen due to history of COPD  · Initially was requiring 4 L nasal cannula in the emergency room but able to be weaned down to her home 3 L nasal cannula today  · Continue home inhalers and nebulizers     SVT (supraventricular tachycardia) ([facility] )  Assessment & Plan  · Patient did have evidence of SVT while in the emergency room with heart rate in the 180s  · Patient received adenosine and IV metoprolol and started on Cardizem drip which has since been discontinued  · Maintain outpatient on lopressor 100 mg b i d , given hypotension she was restarted on 25 mg b i d  Yesterday, will increase to 50 mg b i d  Today and continue increasing as blood pressure allows    MIK (acute kidney injury) ([facility] )  Assessment & Plan  · Creatinine was found to be elevated at 1 2 at time of admission  · Resolved to 0 8    Ambulatory dysfunction  Assessment & Plan  · Seen in consult by Physical and Occupational therapy recommended rehab placement  · Plan for discharge back to [address] [place] [address],[address] at discharge    Thrombocytopenia [facility])  Assessment & Plan  · Secondary to multiple myeloma  · Platelets 20, no evidence of active bleed    GERD (gastroesophageal reflux disease)  Assessment & Plan  · Continue daily PPI    Multiple myeloma ([facility] )  Assessment & Plan  · Known history of multiple myeloma with thrombocytopenia and anemia requiring blood transfusion yesterday  · Blood counts appear more stable this morning  · Continue outpatient Hematology-Oncology follow-up        VTE Pharmacologic Prophylaxis: VTE Score: 4 Moderate Risk (Score 3-4) - Pharmacological DVT Prophylaxis Contraindicated  Sequential Compression Devices Ordered      Patient Centered Rounds: I performed bedside rounds with nursing staff today  Discussions with Specialists or Other Care Team Provider: nursing     Education and Discussions with Family / Patient: Updated  () via phone  Time Spent for Care: 30 minutes  More than 50% of total time spent on counseling and coordination of care as described above  Current Length of Stay: 2 day(s)  Current Patient Status: Inpatient   Certification Statement: The patient will continue to require additional inpatient hospital stay due to Sepsis due to UTI  Discharge Plan: Anticipate discharge in 48-72 hrs to rehab facility  Code Status: Level 1 - Full Code    Subjective:   Patient reports she is doing better today  Still having some back pain but she reports this is chronic  Reports her breathing continues to improve  Does report poor appetite but that she has continued to drink appropriately  Denies any further episodes of diarrhea  Objective:     Vitals:   Temp (24hrs), Av 7 °F (36 5 °C), Min:96 8 °F (36 °C), Max:98 7 °F (37 1 °C)    Temp:  [96 8 °F (36 °C)-98 7 °F (37 1 °C)] 97 6 °F (36 4 °C)  HR:  [] 85  Resp:  [16-20] 18  BP: (104-137)/(54-81) 134/78  SpO2:  [95 %-100 %] 95 %  Body mass index is 36 98 kg/m²  Input and Output Summary (last 24 hours): Intake/Output Summary (Last 24 hours) at 2022 1228  Last data filed at 2022 1223  Gross per 24 hour   Intake 1270 ml   Output 3375 ml   Net -2105 ml       Physical Exam:   Physical Exam  Vitals reviewed  Constitutional:       General: She is not in acute distress  HENT:      Head: Normocephalic and atraumatic  Eyes:      General: No scleral icterus  Conjunctiva/sclera: Conjunctivae normal    Cardiovascular:      Rate and Rhythm: Normal rate and regular rhythm  Heart sounds: No murmur heard  Pulmonary:      Effort: Pulmonary effort is normal  No respiratory distress  Breath sounds: Normal breath sounds     Abdominal:      General: Bowel sounds are normal  There is no distension  Palpations: Abdomen is soft  Tenderness: There is no abdominal tenderness  Musculoskeletal:      Cervical back: Neck supple  Right lower leg: No edema  Left lower leg: No edema  Skin:     General: Skin is warm and dry  Neurological:      Mental Status: She is alert and oriented to person, place, and time  Psychiatric:         Mood and Affect: Mood normal          Behavior: Behavior normal           Additional Data:     Labs:  Results from last 7 days   Lab Units 05/11/22  0619 05/10/22  0846 05/09/22  0601   WBC Thousand/uL 6 25   < > 9 14   HEMOGLOBIN g/dL 7 3*   < > 9 8*   HEMATOCRIT % 23 0*   < > 30 1*   PLATELETS Thousands/uL 20*   < > 35*   BANDS PCT %  --   --  2   LYMPHO PCT %  --   --  3*   MONO PCT %  --   --  8   EOS PCT %  --   --  0    < > = values in this interval not displayed  Results from last 7 days   Lab Units 05/11/22  0619 05/10/22  0609 05/09/22  0601   SODIUM mmol/L 145   < > 138   POTASSIUM mmol/L 3 0*   < > 3 6   CHLORIDE mmol/L 112*   < > 102   CO2 mmol/L 25   < > 26   BUN mg/dL 8   < > 12   CREATININE mg/dL 0 80   < > 1 26   ANION GAP mmol/L 8   < > 10   CALCIUM mg/dL 9 1   < > 10 1   ALBUMIN g/dL  --   --  2 1*   TOTAL BILIRUBIN mg/dL  --   --  0 49   ALK PHOS U/L  --   --  154*   ALT U/L  --   --  33   AST U/L  --   --  34   GLUCOSE RANDOM mg/dL 93   < > 160*    < > = values in this interval not displayed       Results from last 7 days   Lab Units 05/09/22  0601   INR  1 21*     Results from last 7 days   Lab Units 05/11/22  1116 05/11/22  0756 05/10/22  2034 05/10/22  1600 05/10/22  1137 05/10/22  0725 05/09/22  2038 05/09/22  1602 05/09/22  1145   POC GLUCOSE mg/dl 169* 107 131 127 165* 110 152* 218* 141*     Results from last 7 days   Lab Units 05/09/22  0439   HEMOGLOBIN A1C % 6 2*     Results from last 7 days   Lab Units 05/11/22  0619 05/10/22  0609 05/09/22  1330 05/09/22  1146 05/09/22  0947 05/09/22  0752 05/09/22  0601   LACTIC ACID mmol/L  --   --  1 4 3 0* 2 4* 3 2* 3 8*   PROCALCITONIN ng/ml 12 68* 21 88*  --   --   --   --  9 60*       Lines/Drains:  Invasive Devices  Report    Peripheral Intravenous Line  Duration           Peripheral IV 05/09/22 Left Antecubital 2 days    Peripheral IV 05/09/22 Right Wrist 2 days          Drain  Duration           Urethral Catheter Temperature probe 16 Fr  2 days              Urinary Catheter:  Goal for removal: N/A - Chronic Lee           Telemetry:  Telemetry Orders (From admission, onward)             48 Hour Telemetry Monitoring  Continuous x 48 hours        References:    Telemetry Guidelines   Question:  Reason for 48 Hour Telemetry  Answer:  Arrhythmias Requiring Medical Therapy (eg  SVT, Vtach/fib, Bradycardia, Uncontrolled A-fib)                 Telemetry Reviewed: Normal Sinus Rhythm  Indication for Continued Telemetry Use: Arrthymias requiring medical therapy             Imaging: No pertinent imaging reviewed  Recent Cultures (last 7 days):   Results from last 7 days   Lab Units 05/09/22  0616 05/09/22  0615 05/09/22  4648   BLOOD CULTURE  No Growth at 48 hrs  --  No Growth at 48 hrs     URINE CULTURE   --  >100,000 cfu/ml Escherichia coli*  >100,000 cfu/ml Enterococcus faecalis*  --        Last 24 Hours Medication List:   Current Facility-Administered Medications   Medication Dose Route Frequency Provider Last Rate    acetaminophen  650 mg Oral Q6H PRN Humberto Price, DO      acyclovir  400 mg Oral Q4H While Awake Edelmira Ambron, DO      allopurinol  200 mg Oral Daily Edelmira Ambron, DO      ascorbic acid  500 mg Oral Daily Edelmira Ambron, DO      cefepime  2,000 mg Intravenous Q12H Edelmira Ambron, DO 2,000 mg (05/11/22 1026)    fluticasone  2 spray Nasal Daily Edelmira Ambron, DO      fluticasone-vilanterol  1 puff Inhalation Daily Edelmira Ambron, DO      gabapentin  100 mg Oral BID Edelmira Ambron, DO      insulin lispro  1-5 Units Subcutaneous TID AC Edelmira Ambron, DO      insulin lispro  1-5 Units Subcutaneous HS Edelmira Ambron, DO      ipratropium  0 25 mg Nebulization BID Edelmira Ambron, DO      levalbuterol  0 63 mg Nebulization Q8H PRN Edelmira Ambron, DO      LORazepam  0 5 mg Oral Q6H PRN Edelmira Ambron, DO      metoprolol tartrate  50 mg Oral Q12H [address] Deb Garcia PA-C      morphine  15 mg Oral Q12H [address] Edelmira Ambron, DO      Morphine Sulfate (Concentrate)  5 mg Oral Q6H PRN Pascual Mosher, DO      multivitamin stress formula  1 tablet Oral Daily Edelmira Ambron, DO      naloxone  0 04 mg Intravenous Q1MIN PRN Edelmira Ambron, DO      ondansetron  4 mg Intravenous Q6H PRN Edelmira Ambron, DO      pantoprazole  20 mg Oral Early Morning Edelmira Ambron, DO      potassium chloride  40 mEq Oral BID Edelmira Ambron, DO      senna  1 tablet Oral HS Edelmira Ambron, DO      vancomycin  750 mg Intravenous Q12H Edelmira Ambron,  mg (05/11/22 1136)        Today, Patient Was Seen By: Brie Peguero PA-C    **Please Note: This note may have been constructed using a voice recognition system  **

## 2022-05-11 NOTE — DISCHARGE INSTR - OTHER ORDERS
Wound Care Plan:   1-Hydraguard lotion to right buttock and bilateral heels twice daily for skin protection/hydration  2-Elevate heels off of bed/chair surface to offload pressure  3-Offloading air cushion in chair when out of bed  4-Moisturize skin daily with lotion  5-Turn/reposition every 2 hours while in bed and weight shift frequently while in chair for pressure re-distribution on skin  6-Left buttock--cleanse with soap and water, pat dry  Apply Xeroform to wound and cover with Allevyn Life foam dressing  Change dressing every other day and as needed with soilage  If patient develops diarrhea, discontinue foam dressing and apply Calazime paste to bilateral buttocks three times daily and as needed  7-Low air-loss mattress

## 2022-05-11 NOTE — CASE MANAGEMENT
Case Management Assessment & Discharge Planning Note    Patient name Ara Leong  Location [address] [place] [address] [address] [place] [id number]-* MRN [id number]  : 1955 Date 2022       Current Admission Date: 2022  Current Admission Diagnosis:Sepsis [facility])   Patient Active Problem List    Diagnosis Date Noted    Continuous opioid dependence ([facility] ) 05/10/2022    Acute encephalopathy 05/10/2022    Hypokalemia 05/10/2022    MIK (acute kidney injury) ([facility] ) 2022    SVT (supraventricular tachycardia) ([facility] ) 2022    Chronic respiratory failure ([facility] ) 2022    Type 2 diabetes mellitus ([facility] ) 2022    Sepsis ([facility] ) 2022    Pressure injury of right buttock, unstageable ([facility] ) 2022    Pressure injury of left buttock, stage 2 ([facility] ) 2022    Ambulatory dysfunction 2022    Multifocal pneumonia 2019    Multiple myeloma ([facility] ) 2019    Hypertension 2019    GERD (gastroesophageal reflux disease) 2019    Thrombocytopenia ([facility] ) 2019    Asthma 2019      LOS (days): 2  Geometric Mean LOS (GMLOS) (days): 4 80  Days to GMLOS:2 4     OBJECTIVE:    Risk of Unplanned Readmission Score: 32 45         Current admission status: Inpatient       Preferred Pharmacy:   [facility] [address] [facility], [address] [address] [place] [address]  Phone: [phone] Fax: [phone]    Primary Care Provider: No primary care provider on file      Primary Insurance: MEDICARE  Secondary Insurance:     ASSESSMENT:  [facility], [address] [facility] - Daughter   Primary Phone: [phone] (Mobile)               Advance Directives  Does patient have a [address]?: Yes  Does patient have Advance Directives?: Yes  Advance Directives: Living will, Power of  for health care  Primary Contact: Alyse Arrington (Daughter)   [phone] (M)         Readmission Root Cause  30 Day Readmission: No    Patient Information  Admitted from[de-identified] Facility (Pt came from [facility] as a short stay rehab and spouse agreeabel for pt to return home )  Mental Status: Alert  During Assessment patient was accompanied by: Not accompanied during assessment  Assessment information provided by[de-identified] Spouse  Primary Caregiver: Other (Comment) (Spouse stated pt had RN coming out 2x a week prior to going to [facility] for a short stay rehab )  Support Systems: Spouse/significant other  [place] of Residence: [address] [facility] do you live in?: [address] [facility] entry access options   Select all that apply : Stairs  Number of steps to enter home : 3 (3 step plus 3 steps)  Do the steps have railings?: Yes  Type of Current Residence: [place]  In the last 12 months, was there a time when you were not able to pay the mortgage or rent on time?: No  In the last 12 months, how many places have you lived?: 1  In the last 12 months, was there a time when you did not have a steady place to sleep or slept in a shelter (including now)?: No  Homeless/housing insecurity resource given?: N/A  Living Arrangements: Lives w/ Spouse/significant other  Is patient a ?: No    Activities of Daily Living Prior to Admission  Functional Status: Independent  Completes ADLs independently?: No  Level of ADL dependence: Assistance (Spouse stated pt had RN 2 x week prior to going to SNF for short stay, but does not know the name of agency )  Ambulates independently?: Yes  Does patient use assisted devices?: Yes  Assisted Devices (DME) used: Jemal Reason  Does patient currently own DME?: Yes  What DME does the patient currently own?: Bedside Commode, Rollator, Home Oxygen concentrator, Portable Oxygen concentrator, Portable Oxygen tanks, Walker, Straight [place], Shower Chair (Pt uses [facility] for home 02)  Does patient have a history of Outpatient Therapy (PT/OT)?: No  Does the patient have a history of Short-Term Rehab?: Yes (Came from [facility] )  Does patient have a history of HHC?: Yes  Does patient currently have Romi Clark?: No         Patient Information Continued  Income Source: Pension/MC[employer]  Does patient have prescription coverage?: Yes  Within the past 12 months, you worried that your food would run out before you got the money to buy more : Never true  Within the past 12 months, the food you bought just didn't last and you didn't have money to get more : Never true  Food insecurity resource given?: N/A  Does patient receive dialysis treatments?: No  Does patient have a history of substance abuse?: No  Does patient have a history of Mental Health Diagnosis?: No         Means of Transportation  Means of Transport to Appts[de-identified] Family transport  In the past 12 months, has lack of transportation kept you from medical appointments or from getting medications?: No  In the past 12 months, has lack of transportation kept you from meetings, work, or from getting things needed for daily living?: No  Was application for public transport provided?: N/A        DISCHARGE DETAILS:    Discharge planning discussed with[de-identified] Spouse and pt  Freedom of Choice: Yes  Comments - Freedom of Choice: Met with pt to completed assessment and she asked for this LSW to call alan Aleman stated pt came from [facility] and was there for a short stay  Spouse would like to her return  Made a referral back to them today    CM contacted family/caregiver?: Yes  Were Treatment Team discharge recommendations reviewed with patient/caregiver?: Yes  Did patient/caregiver verbalize understanding of patient care needs?: Yes  Were patient/caregiver advised of the risks associated with not following Treatment Team discharge recommendations?: Yes                        Would you like to participate in our [facility] service program?  : No - Declined    Treatment Team Recommendation: Short Term Rehab  Discharge Destination Plan[de-identified] Short Term Rehab

## 2022-05-11 NOTE — ASSESSMENT & PLAN NOTE
· Patient did have evidence of SVT while in the emergency room with heart rate in the 180s  · Patient received adenosine and IV metoprolol and started on Cardizem drip which has since been discontinued  · Maintain outpatient on lopressor 100 mg b i d , given hypotension she was restarted on 25 mg b i d  Yesterday, will increase to 50 mg b i d   Today and continue increasing as blood pressure allows

## 2022-05-11 NOTE — PROGRESS NOTES
Vancomycin IV Pharmacy-to-Dose Consultation    Rosey Espinosa is a 78 y o  female who is currently receiving Vancomycin IV with management by the Pharmacy Consult service  Assessment/Plan:  The patient was reviewed  Renal function is stable and no signs or symptoms of nephrotoxicity and/or infusion reactions were documented in the chart  Based on todays assessment, continue current vancomycin (day # 2) dosing of 750mg IV every 12 hours, with a plan for trough to be drawn at 1145 on 5/12/22  We will continue to follow the patients culture results and clinical progress daily      Jose Dominguez, Pharmacist

## 2022-05-11 NOTE — WOUND OSTOMY CARE
Consult Note - Wound   Rosey Espinosa 78 y o  female MRN: [id number]  Unit/Bed#: E4 -01 Encounter: [id number]      History and Present Illness:  78year old female presented to the hospital with tachycardia  Patient's history significant for bilateral buttock stage 3 pressure injuries during recent hospitalization, multiple myeloma, urinary retention with costello catheter, DM, anemia, thrombocytopenia  Assessment Findings:   Patient assessed along with primary RN, patient agreeable to assessment  She is able to turn on her side with minimal assist   Costello catheter in place  Reports recently having diarrhea which has resolved  Bilateral heels intact, pink, blanchable, dry  1   Present on admission right buttock (healing stage 3 pressure injury per [facility] documentation)--small scabbed area with surrounding intact, blanchable pink/tan hypo/hyperpigmentation  2   Present on admission left buttock evolving deep tissue pressure injury (suspect stage 3, 4, or unstageable when fully evolved)--patient with documented history of stage 3 pressure injury to left buttock  On exam today, wound is purple, maroon, non-blanchable  It is beginning to evolve at proximal edge  No drainage or induration  See flowsheet for wound details  Wound Care Plan:   1-Hydraguard lotion to right buttock and bilateral heels twice daily and as needed  2-Elevate heels off of bed/chair surface to offload pressure  3-Offloading air cushion in chair when out of bed  4-Moisturize skin daily with skin nourishing cream   5-Turn/reposition every 2 hours while in bed and weight shift frequently while in chair for pressure re-distribution on skin  6-Left buttock--cleanse with soap and water, pat dry  Apply Xeroform to wound and cover with Allevyn Life foam dressing  Change dressing every other day and as needed with soilage    If patient develops diarrhea, discontinue foam dressing and apply Calazime paste to bilateral buttocks three times daily and as needed  7-P500 low air-loss mattress  Wound care team to follow  Plan of care reviewed with primary RN  KATHE MCMILLAN, Radha Conley, made aware of wound assessment  Wound 05/10/22 Pressure Injury Abrasion(s) Buttocks Right (Active)   Wound Image   05/11/22 1228   Wound Description Dry;Brown 05/11/22 1228   Aleja-wound Assessment Hyperpigmented;Pink;Scar Tissue 05/11/22 1228   Wound Length (cm) 0 3 cm 05/11/22 1228   Wound Width (cm) 0 3 cm 05/11/22 1228   Wound Depth (cm) 0 cm 05/11/22 1228   Wound Surface Area (cm^2) 0 09 cm^2 05/11/22 1228   Wound Volume (cm^3) 0 cm^3 05/11/22 1228   Calculated Wound Volume (cm^3) 0 cm^3 05/11/22 1228   Drainage Amount None 05/11/22 1228   Treatments Cleansed 05/11/22 1228   Dressing Moisture barrier 05/11/22 1228   Patient Tolerance Tolerated well 05/11/22 1228       Wound 05/10/22 Pressure Injury Buttocks Left (Active)   Wound Image   05/11/22 1228   Wound Description Non-blanchable erythema;Light purple; Beefy red 05/11/22 1228   Pressure Injury Stage DTPI 05/11/22 1228   Aleja-wound Assessment Hyperpigmented;Pink;Scar Tissue 05/11/22 1228   Wound Length (cm) 4 cm 05/11/22 1228   Wound Width (cm) 2 5 cm 05/11/22 1228   Wound Depth (cm) 0 1 cm 05/11/22 1228   Wound Surface Area (cm^2) 10 cm^2 05/11/22 1228   Wound Volume (cm^3) 1 cm^3 05/11/22 1228   Calculated Wound Volume (cm^3) 1 cm^3 05/11/22 1228   Drainage Amount None 05/11/22 1228   Treatments Cleansed 05/11/22 1228   Dressing Xeroform; Foam, Silicon (eg  Allevyn, etc) 05/11/22 1228   Dressing Changed New 05/11/22 1228   Patient Tolerance Tolerated well 05/11/22 1228   Dressing Status Dry;Clean; Intact 05/11/22 [address] Craig OSORION, RN, [facility]

## 2022-05-11 NOTE — OCCUPATIONAL THERAPY NOTE
Occupational Therapy Evaluation     Patient Name: Mamta Parry  DQCKH'I Date: 5/11/2022  Problem List  Principal Problem:    Sepsis ([facility] )  Active Problems:    Multiple myeloma ([facility] )    GERD (gastroesophageal reflux disease)    Thrombocytopenia (HCC)    Ambulatory dysfunction    MIK (acute kidney injury) ([facility] )    SVT (supraventricular tachycardia) (HCC)    Chronic respiratory failure (HCC)    Type 2 diabetes mellitus (HCC)    Continuous opioid dependence ([facility] )    Acute encephalopathy    Hypokalemia    Past Medical History  Past Medical History:   Diagnosis Date    Anemia     Asthma     Chronic constipation     Chronic pain     COPD (chronic obstructive pulmonary disease) (HCC)     CTS (carpal tunnel syndrome)     DJD (degenerative joint disease)     GERD (gastroesophageal reflux disease)     Hypertension     Multiple myeloma ([facility])     SAMIR (obstructive sleep apnea)     Osteonecrosis ([place] [address] )     Sciatica     Thoracic aortic aneurysm [facility])      Past Surgical History  Past Surgical History:   Procedure Laterality Date    BONE MARROW BIOPSY      COLONOSCOPY      PORTACATH PLACEMENT      REDUCTION MAMMAPLASTY      VERTEBROPLASTY             05/11/22 0855   OT Last Visit   OT Visit Date 05/11/22   Note Type   Note type Evaluation   Restrictions/Precautions   Weight Bearing Precautions Per Order No   Other Precautions Cognitive; Chair Alarm; Bed Alarm;O2;Fall Risk;Multiple lines;Telemetry  (3L O2)   Pain Assessment   Pain Assessment Tool 0-10   Pain Score 5   Pain Location/Orientation Location: Back   Hospital Pain Intervention(s) Repositioned; Ambulation/increased activity; Emotional support; Rest   Multiple Pain Sites No   Home Living   Type of [address] level; Laundry in basement   Home Equipment Walker;Cane   Additional Comments Pt is a poor historian, info on home setup and PLOF obtained via pt and pt's chart  Pt [facility] from [facility] she [facility] receiving STR     Prior Function Level of [place] Needs assistance with ADLs and functional mobility; Needs assistance with IADLs   Lives With Facility staff  (at rehab)   [address] Help From Family;Personal care attendant   ADL Assistance Needs assistance   IADLs Needs assistance   Falls in the last 6 months 5 to 10  (5 per pt report)   Comments At rehab, pt required assist w/ ADLs, IADLs, and functional transfers/mobility w/ use of RW  Lifestyle   Autonomy At rehab, pt required assist w/ ADLs, IADLs, and functional transfers/mobility w/ use of RW  Reciprocal Relationships Spouse   Psychosocial   Psychosocial (WDL) WDL   ADL   Where Assessed Edge of bed   Eating Assistance 5  Supervision/Setup   Grooming Assistance 5  Supervision/Setup   UB Bathing Assistance 4  Minimal Assistance   LB Bathing Assistance 3  Moderate Assistance   UB Dressing Assistance 4  Minimal [address] 3  Moderate [address]  3  Moderate Assistance   Functional Assistance 3  Moderate Assistance   Bed Mobility   Supine to Sit 3  Moderate assistance   Additional items Assist x 1;HOB elevated; Bedrails; Increased time required;Verbal cues;LE management   Sit to Supine 3  Moderate assistance   Additional items Assist x 1; Increased time required;Verbal cues;LE management   Additional Comments Pt lying supine with bed alarm activated at end of session  Call bell and phone within reach  All needs met and pt reports no further questions for OT at this time  Transfers   Sit to Stand 3  Moderate assistance   Additional items Assist x 1; Increased time required;Verbal cues   Stand to Sit 3  Moderate assistance   Additional items Assist x 1; Increased time required;Verbal cues   Additional Comments Cues for safe technique and hand placement   Functional Mobility   Functional Mobility 3  Moderate assistance   Additional Comments Assist x1 for balance/steadying w/ use of RW     Additional items Rolling walker   Balance   Static Sitting Fair   Dynamic Sitting Fair -   Static Standing Poor +   Dynamic Standing Poor   Ambulatory Poor   Activity Tolerance   Activity Tolerance Patient limited by fatigue   Medical Staff Janelle Aldridge, RN   Nurse Made Aware yes   RUE Assessment   RUE Assessment X  (slight proximal limitations (* shldr flex); Elbow-distal=WFL)   RUE Strength   R Shoulder Flexion 3+/5   R Shoulder Extension 3+/5   R Elbow Flexion 4-/5   R Elbow Extension 4-/5   LUE Assessment   LUE Assessment X  (slight proximal limitations (* shldr flex); Elbow-distal=WFL)   LUE Strength   L Shoulder Flexion 3+/5   L Shoulder Extension 3+/5   L Elbow Flexion 4-/5   L Elbow Extension 4-/5   Hand Function   Gross Motor Coordination Functional   Fine Motor Coordination Functional   Sensation   Light Touch No apparent deficits   Proprioception   Proprioception No apparent deficits   Vision-Basic Assessment   Current Vision   (Glasses)   Vision - Complex Assessment   Ocular Range of Motion [facility]   Acuity Able to read clock/calendar on wall without difficulty; Able to read employee name badge without difficulty   Perception   Inattention/Neglect Appears intact   Cognition   Overall Cognitive Status Impaired   Arousal/Participation Alert; Cooperative   Attention Attends with cues to redirect   Orientation Level Oriented to person;Oriented to place;Oriented to time;Disoriented to situation   Memory Decreased recall of precautions;Decreased recall of recent events;Decreased short term memory   Following Commands Follows one step commands with increased time or repetition   Comments Pleasant and cooperative  Increased processing time  Limited insight into deficits   Assessment   Limitation Decreased ADL status; Decreased UE strength;Decreased Safe judgement during ADL;Decreased cognition;Decreased endurance;Decreased self-care trans;Decreased high-level ADLs   Prognosis Fair   Assessment Pt is a 78 y o  female seen for OT evaluation s/p adm to [facility] w/ fever and tachycardia  Pt admitted w/ Sepsis, Hypokalemia, Acute encephalopathy, SVT, MIK, and Ambulatory dysfunction  Comorbidities affecting pts functional performance include a significant PMH of Anemia, Asthma, COPD, CTS, DJD, HTN, Multiple Myeloma, Sciatica, and Thoracic aortic aneurysm  Pt with active OT orders and activity orders for Up with assistance  Pt is a poor historian, info on home setup and PLOF obtained via pt and pt's chart  Pt presents from [address] where she was receiving STR  At rehab, pt required assist w/ ADLs, IADLs, and functional transfers/mobility w/ use of RW  Upon evaluation, pt currently requires Min A for UB ADLs, Mod A for LB ADLs, Mod A for toileting, Mod A for bed mobility, and Mod A for functional mobility/transfers 2* the following deficits impacting occupational performance: decreased ROM, decreased strength, decreased balance, decreased tolerance, impaired initiation, impaired problem solving and decreased safety awareness  These impairments, as well at pts fall risk, multiple lines, difficulty performing ADLS, limited insight into deficits and decreased initiation and engagement  limit pts ability to safely engage in all baseline areas of occupation  Pt to continue to benefit from continued acute OT services during hospital stay to address defined deficits and to maximize level of functional independence in the following Occupational Performance areas: grooming, bathing/shower, toilet hygiene, dressing, medication management, health maintenance, functional mobility, community mobility, clothing management and social participation  From OT standpoint, recommend STR upon D/C   OT will continue to follow pt 3-5x/wk to address the following goals to  w/in 10-14 days:   Goals   Patient Goals To get back into bed   LTG Time Frame 10-14   Long Term Goal Please refer to LTGs listed below   Plan   Treatment Interventions ADL retraining;Functional transfer training;UE strengthening/ROM; Endurance training;Patient/family training;Equipment evaluation/education; Compensatory technique education;Continued evaluation; Energy conservation; Activityengagement   Goal Expiration Date 05/25/22   OT Treatment Day 0   OT Frequency 3-5x/wk   Recommendation   OT Discharge Recommendation Post acute rehabilitation services   OT - OK to Discharge Yes  (when medically cleared to rehab)   Additional Comments  The patient's raw score on the AM-PAC Daily Activity inpatient short form is 15, standardized score is 34 69, less than 39 4  Patients at this level are likely to benefit from discharge to post-acute rehabilitation services  Please refer to the recommendation of the Occupational Therapist for safe discharge planning  AM-PAC Daily Activity Inpatient   Lower Body Dressing 2   Bathing 2   Toileting 2   Upper Body Dressing 3   Grooming 3   Eating 3   Daily Activity Raw Score 15   Daily Activity Standardized Score (Calc for Raw Score >=11) 34 69   AM-PAC Applied Cognition Inpatient   Following a Speech/Presentation 4   Understanding Ordinary Conversation 4   Taking Medications 2   Remembering Where Things Are Placed or Put Away 2   Remembering List of 4-5 Errands 2   Taking Care of Complicated Tasks 2   Applied Cognition Raw Score 16   Applied Cognition Standardized Score 35 03        GOALS    1  Pt will improve activity tolerance to G for min 30 min txment sessions for increase engagement in functional tasks    2  Pt will complete bed mobility at a Mod I level w/ G balance/safety demonstrated to decrease caregiver assistance required     3  Pt will complete UB dressing/self care w/ mod I using adaptive device and DME as needed     4  Pt will complete LB dressing/self care w/ mod I using adaptive device and DME as needed    5  Pt will complete toileting w/ mod I w/ G hygiene/thoroughness using DME as needed    6   Pt will improve functional transfers to Mod I on/off all surfaces using DME as needed w/ G balance/safety     7  Pt will improve functional mobility during ADL/IADL/leisure tasks to Mod I using DME as needed w/ G balance/safety     8  Pt will be attentive 100% of the time during ongoing cognitive assessment w/ G participation to assist w/ safe d/c planning/recommendations    9  Pt will demonstrate G carryover of pt/caregiver education and training as appropriate w/o cues w/ good tolerance to increase safety during functional tasks    10  Pt will demonstrate 100% carryover of energy conservation techniques t/o functional I/ADL/leisure tasks w/o cues s/p skilled education to increase endurance during functional tasks    11  Pt will increase BUE strength by 1MM grade via AROM/AAROM exercises to increase independence in ADLs and transfers    12  Pt will verbalize 3 potential fall hazards and identify appropriate compensatory techniques to decrease fall risk in home environment     13   Pt will increase standing tolerance to 5-8 mins with Fair+ dynamic standing balance to increase safety during participation in ADLs       Kishor Barboza, OTR/L

## 2022-05-11 NOTE — ASSESSMENT & PLAN NOTE
Lab Results   Component Value Date    HGBA1C 6 2 (H) 05/09/2022       Recent Labs     05/10/22  1600 05/10/22  2034 05/11/22  0756 05/11/22  1116   POCGLU 127 131 107 169*       Blood Sugar Average: Last 72 hrs:  (P) 550 [id number]   · Hold oral metformin while inpatient  · Sliding scale insulin coverage with Accu-Cheks

## 2022-05-11 NOTE — PLAN OF CARE
Problem: MOBILITY - ADULT  Goal: Maintain or return to baseline ADL function  Description: INTERVENTIONS:  -  Assess patient's ability to carry out ADLs; assess patient's baseline for ADL function and identify physical deficits which impact ability to perform ADLs (bathing, care of mouth/teeth, toileting, grooming, dressing, etc )  - Assess/evaluate cause of self-care deficits   - Assess range of motion  - Assess patient's mobility; develop plan if impaired  - Assess patient's need for assistive devices and provide as appropriate  - Encourage maximum independence but intervene and supervise when necessary  - Involve family in performance of ADLs  - Assess for home care needs following discharge   - Consider OT consult to assist with ADL evaluation and planning for discharge  - Provide patient education as appropriate  Outcome: Progressing  Goal: Maintains/Returns to pre admission functional level  Description: INTERVENTIONS:  - Perform BMAT or MOVE assessment daily    - Set and communicate daily mobility goal to care team and patient/family/caregiver  - Collaborate with rehabilitation services on mobility goals if consulted  - Perform Range of Motion  times a day  - Reposition patient every  hours    - Dangle patient  times a day  - Stand patient  times a day  - Ambulate patient  times a day  - Out of bed to chair  times a day   - Out of bed for meals  times a day  - Out of bed for toileting  - Record patient progress and toleration of activity level   Outcome: Progressing     Problem: Prexisting or High Potential for Compromised Skin Integrity  Goal: Skin integrity is maintained or improved  Description: INTERVENTIONS:  - Identify patients at risk for skin breakdown  - Assess and monitor skin integrity  - Assess and monitor nutrition and hydration status  - Monitor labs   - Assess for incontinence   - Turn and reposition patient  - Assist with mobility/ambulation  - Relieve pressure over bony prominences  - Avoid friction and shearing  - Provide appropriate hygiene as needed including keeping skin clean and dry  - Evaluate need for skin moisturizer/barrier cream  - Collaborate with interdisciplinary team   - Patient/family teaching  - Consider wound care consult   Outcome: Progressing     Problem: Potential for Falls  Goal: Patient will remain free of falls  Description: INTERVENTIONS:  - Educate patient/family on patient safety including physical limitations  - Instruct patient to call for assistance with activity   - Consult OT/PT to assist with strengthening/mobility   - Keep Call bell within reach  - Keep bed low and locked with side rails adjusted as appropriate  - Keep care items and personal belongings within reach  - Initiate and maintain comfort rounds  - Make Fall Risk Sign visible to staff  - Offer Toileting every  Hours, in advance of need  - Initiate/Maintain alarm  - Obtain necessary fall risk management equipment:   - Apply yellow socks and bracelet for high fall risk patients  - Consider moving patient to room near nurses station  Outcome: Progressing     Problem: PAIN - ADULT  Goal: Verbalizes/displays adequate comfort level or baseline comfort level  Description: Interventions:  - Encourage patient to monitor pain and request assistance  - Assess pain using appropriate pain scale  - Administer analgesics based on type and severity of pain and evaluate response  - Implement non-pharmacological measures as appropriate and evaluate response  - Consider cultural and social influences on pain and pain management  - Notify physician/advanced practitioner if interventions unsuccessful or patient reports new pain  Outcome: Progressing     Problem: INFECTION - ADULT  Goal: Absence or prevention of progression during hospitalization  Description: INTERVENTIONS:  - Assess and monitor for signs and symptoms of infection  - Monitor lab/diagnostic results  - Monitor all insertion sites, i e  indwelling lines, tubes, and drains  - Monitor endotracheal if appropriate and nasal secretions for changes in amount and color  - [place] appropriate cooling/warming therapies per order  - Administer medications as ordered  - Instruct and encourage patient and family to use good hand hygiene technique  - Identify and instruct in appropriate isolation precautions for identified infection/condition  Outcome: Progressing  Goal: Absence of fever/infection during neutropenic period  Description: INTERVENTIONS:  - Monitor WBC    Outcome: Progressing     Problem: SAFETY ADULT  Goal: Maintain or return to baseline ADL function  Description: INTERVENTIONS:  -  Assess patient's ability to carry out ADLs; assess patient's baseline for ADL function and identify physical deficits which impact ability to perform ADLs (bathing, care of mouth/teeth, toileting, grooming, dressing, etc )  - Assess/evaluate cause of self-care deficits   - Assess range of motion  - Assess patient's mobility; develop plan if impaired  - Assess patient's need for assistive devices and provide as appropriate  - Encourage maximum independence but intervene and supervise when necessary  - Involve family in performance of ADLs  - Assess for home care needs following discharge   - Consider OT consult to assist with ADL evaluation and planning for discharge  - Provide patient education as appropriate  Outcome: Progressing  Goal: Maintains/Returns to pre admission functional level  Description: INTERVENTIONS:  - Perform BMAT or MOVE assessment daily    - Set and communicate daily mobility goal to care team and patient/family/caregiver  - Collaborate with rehabilitation services on mobility goals if consulted  - Perform Range of Motion  times a day  - Reposition patient every  hours    - Dangle patient  times a day  - Stand patient  times a day  - Ambulate patient  times a day  - Out of bed to chair  times a day   - Out of bed for meals  times a day  - Out of bed for toileting  - Record patient progress and toleration of activity level   Outcome: Progressing  Goal: Patient will remain free of falls  Description: INTERVENTIONS:  - Educate patient/family on patient safety including physical limitations  - Instruct patient to call for assistance with activity   - Consult OT/PT to assist with strengthening/mobility   - Keep Call bell within reach  - Keep bed low and locked with side rails adjusted as appropriate  - Keep care items and personal belongings within reach  - Initiate and maintain comfort rounds  - Make Fall Risk Sign visible to staff  - Offer Toileting every  Hours, in advance of need  - Initiate/Maintain alarm  - Obtain necessary fall risk management equipment:   - Apply yellow socks and bracelet for high fall risk patients  - Consider moving patient to room near nurses station  Outcome: Progressing     Problem: DISCHARGE PLANNING  Goal: Discharge to home or other facility with appropriate resources  Description: INTERVENTIONS:  - Identify barriers to discharge w/patient and caregiver  - Arrange for needed discharge resources and transportation as appropriate  - Identify discharge learning needs (meds, wound care, etc )  - Arrange for interpretive services to assist at discharge as needed  - Refer to Case Management Department for coordinating discharge planning if the patient needs post-hospital services based on physician/advanced practitioner order or complex needs related to functional status, cognitive ability, or social support system  Outcome: Progressing     Problem: Knowledge Deficit  Goal: Patient/family/caregiver demonstrates understanding of disease process, treatment plan, medications, and discharge instructions  Description: Complete learning assessment and assess knowledge base    Interventions:  - Provide teaching at level of understanding  - Provide teaching via preferred learning methods  Outcome: Progressing     Problem: Nutrition/Hydration-ADULT  Goal: Nutrient/Hydration intake appropriate for improving, restoring or maintaining nutritional needs  Description: Monitor and assess patient's nutrition/hydration status for malnutrition  Collaborate with interdisciplinary team and initiate plan and interventions as ordered  Monitor patient's weight and dietary intake as ordered or per policy  Utilize nutrition screening tool and intervene as necessary  Determine patient's food preferences and provide high-protein, high-caloric foods as appropriate       INTERVENTIONS:  - Monitor oral intake, urinary output, labs, and treatment plans  - Assess nutrition and hydration status and recommend course of action  - Evaluate amount of meals eaten  - Assist patient with eating if necessary   - Allow adequate time for meals  - Recommend/ encourage appropriate diets, oral nutritional supplements, and vitamin/mineral supplements  - Order, calculate, and assess calorie counts as needed  - Recommend, monitor, and adjust tube feedings and TPN/PPN based on assessed needs  - Assess need for intravenous fluids  - Provide specific nutrition/hydration education as appropriate  - Include patient/family/caregiver in decisions related to nutrition  Outcome: Progressing     Problem: NEUROSENSORY - ADULT  Goal: Achieves stable or improved neurological status  Description: INTERVENTIONS  - Monitor and report changes in neurological status  - Monitor vital signs such as temperature, blood pressure, glucose, and any other labs ordered   - Initiate measures to prevent increased intracranial pressure  - Monitor for seizure activity and implement precautions if appropriate      Outcome: Progressing     Problem: CARDIOVASCULAR - ADULT  Goal: Maintains optimal cardiac output and hemodynamic stability  Description: INTERVENTIONS:  - Monitor I/O, vital signs and rhythm  - Monitor for S/S and trends of decreased cardiac output  - Administer and titrate ordered vasoactive medications to optimize hemodynamic stability  - Assess quality of pulses, skin color and temperature  - Assess for signs of decreased coronary artery perfusion  - Instruct patient to report change in severity of symptoms  Outcome: Progressing  Goal: Absence of cardiac dysrhythmias or at baseline rhythm  Description: INTERVENTIONS:  - Continuous cardiac monitoring, vital signs, obtain 12 lead EKG if ordered  - Administer antiarrhythmic and heart rate control medications as ordered  - Monitor electrolytes and administer replacement therapy as ordered  Outcome: Progressing     Problem: RESPIRATORY - ADULT  Goal: Achieves optimal ventilation and oxygenation  Description: INTERVENTIONS:  - Assess for changes in respiratory status  - Assess for changes in mentation and behavior  - Position to facilitate oxygenation and minimize respiratory effort  - Oxygen administered by appropriate delivery if ordered  - Initiate smoking cessation education as indicated  - Encourage broncho-pulmonary hygiene including cough, deep breathe, Incentive Spirometry  - Assess the need for suctioning and aspirate as needed  - Assess and instruct to report SOB or any respiratory difficulty  - Respiratory Therapy support as indicated  Outcome: Progressing     Problem: GENITOURINARY - ADULT  Goal: Urinary catheter remains patent  Description: INTERVENTIONS:  - Assess patency of urinary catheter  - If patient has a chronic costello, consider changing catheter if non-functioning  - Follow guidelines for intermittent irrigation of non-functioning urinary catheter  Outcome: Progressing     Problem: SKIN/TISSUE INTEGRITY - ADULT  Goal: Skin Integrity remains intact(Skin Breakdown Prevention)  Description: Assess:  -Perform Boo assessment every   -Clean and moisturize skin every   -Inspect skin when repositioning, toileting, and assisting with ADLS  -Assess under medical devices such as  every   -Assess extremities for adequate circulation and sensation     Bed Management:  -Have minimal linens on bed & keep smooth, unwrinkled  -Change linens as needed when moist or perspiring  -Avoid sitting or lying in one position for more than  hours while in bed  -Keep HOB at degrees     Toileting:  -Offer bedside commode  -Assess for incontinence every   -Use incontinent care products after each incontinent episode such as     Activity:  -Mobilize patient  times a day  -Encourage activity and walks on unit  -Encourage or provide ROM exercises   -Turn and reposition patient every  Hours  -Use appropriate equipment to lift or move patient in bed  -Instruct/ Assist with weight shifting every  when out of bed in chair  -Consider limitation of chair time  hour intervals    Skin Care:  -Avoid use of baby powder, tape, friction and shearing, hot water or constrictive clothing  -Relieve pressure over bony prominences using   -Do not massage red bony areas    Next Steps:  -Teach patient strategies to minimize risks such as   -Consider consults to  interdisciplinary teams such as   Outcome: Progressing     Problem: METABOLIC, FLUID AND ELECTROLYTES - ADULT  Goal: Electrolytes maintained within normal limits  Description: INTERVENTIONS:  - Monitor labs and assess patient for signs and symptoms of electrolyte imbalances  - Administer electrolyte replacement as ordered  - Monitor response to electrolyte replacements, including repeat lab results as appropriate  - Instruct patient on fluid and nutrition as appropriate  Outcome: Progressing     Problem: HEMATOLOGIC - ADULT  Goal: Maintains hematologic stability  Description: INTERVENTIONS  - Assess for signs and symptoms of bleeding or hemorrhage  - Monitor labs  - Administer supportive blood products/factors as ordered and appropriate  Outcome: Progressing

## 2022-05-11 NOTE — PALLIATIVE CARE CONFERENCE
Palliative MSW saw patient at the bedside today  MSW appreciates the opportunity to provider patient/family with inpatient emotional support and guidance while patient continues to receive medical attention from the medical team     Topics discussed: Dr Citlali Covington and this writer met with pt this afternoon  We introduced ourselves and our roles  Pt states that she has pain, but it is managed  She reviewed her at home medication schedule with Dr Citlali Covington  She follows with [facility] for her caner treatments and also [facility]  Pt lives at home with her   She has 3 adult children from a previous relationship  Her plan is to continue with treatment once she is well enough      Areas that need follow-up: None  Resources given: None  Others present:  Dr Citlali Covington    MSW will continue to follow as requested by the medical team, patient, or family

## 2022-05-11 NOTE — ASSESSMENT & PLAN NOTE
· Patient with chronic pain secondary to multiple myeloma  · Home regimen 15 mg MS Contin b i d  And morphine 10 mg p o  Q 6 hours p r n  · Given acute encephalopathy, doses were decreased  · Patient currently on 15 mg MS Contin b i d  And morphine 5 mg q 6 hours p r n    · Did have some symptoms of nausea and diarrhea yesterday, question withdrawal symptoms, doing better today  · Palliative care consulted

## 2022-05-11 NOTE — RESTORATIVE TECHNICIAN NOTE
Restorative Technician Note      Patient Name: Vicente Marquez     Restorative Tech Visit Date: 5/11/2022  Patient Position Upon Consult: Supine      Pt denied to transfer to her bedside chair to eat her lunch  Pt stated that she is feeling tired

## 2022-05-11 NOTE — ASSESSMENT & PLAN NOTE
· Patient met sepsis criteria time of presentation with fever, tachycardia and leukocytosis  · Procalcitonin found to be elevated, slightly improved to 12 this morning  · Likely due to UTI given abnormal UA-pending sensitivity, urine culture growing E coli and Enterococcus  · Chest x-ray with evidence of possible left lower lobe opacity  · Continue cefepime and vancomycin  · MRSA culture pending, will discontinue vanco if negative  · Patient has been afebrile times 24 hours  · Blood culture negative at 48 hours

## 2022-05-12 LAB
ANION GAP SERPL CALCULATED.3IONS-SCNC: 8 MMOL/L (ref 4–13)
ANISOCYTOSIS BLD QL SMEAR: PRESENT
BACTERIA UR CULT: ABNORMAL
BACTERIA UR CULT: ABNORMAL
BASOPHILS # BLD MANUAL: 0 THOUSAND/UL (ref 0–0.1)
BASOPHILS NFR MAR MANUAL: 0 % (ref 0–1)
BUN SERPL-MCNC: 9 MG/DL (ref 5–25)
CALCIUM SERPL-MCNC: 9.6 MG/DL (ref 8.3–10.1)
CHLORIDE SERPL-SCNC: 111 MMOL/L (ref 100–108)
CO2 SERPL-SCNC: 23 MMOL/L (ref 21–32)
CREAT SERPL-MCNC: 0.81 MG/DL (ref 0.6–1.3)
EOSINOPHIL # BLD MANUAL: 0 THOUSAND/UL (ref 0–0.4)
EOSINOPHIL NFR BLD MANUAL: 0 % (ref 0–6)
ERYTHROCYTE [DISTWIDTH] IN BLOOD BY AUTOMATED COUNT: 21.3 % (ref 11.6–15.1)
GFR SERPL CREATININE-BSD FRML MDRD: 75 ML/MIN/1.73SQ M
GLUCOSE SERPL-MCNC: 109 MG/DL (ref 65–140)
GLUCOSE SERPL-MCNC: 111 MG/DL (ref 65–140)
GLUCOSE SERPL-MCNC: 198 MG/DL (ref 65–140)
GLUCOSE SERPL-MCNC: 213 MG/DL (ref 65–140)
GLUCOSE SERPL-MCNC: 75 MG/DL (ref 65–140)
GLUCOSE SERPL-MCNC: 75 MG/DL (ref 65–140)
GLUCOSE SERPL-MCNC: 93 MG/DL (ref 65–140)
HCT VFR BLD AUTO: 26.5 % (ref 34.8–46.1)
HGB BLD-MCNC: 8.2 G/DL (ref 11.5–15.4)
LYMPHOCYTES # BLD AUTO: 1.04 THOUSAND/UL (ref 0.6–4.47)
LYMPHOCYTES # BLD AUTO: 16 % (ref 14–44)
MCH RBC QN AUTO: 31.7 PG (ref 26.8–34.3)
MCHC RBC AUTO-ENTMCNC: 30.9 G/DL (ref 31.4–37.4)
MCV RBC AUTO: 102 FL (ref 82–98)
MONOCYTES # BLD AUTO: 0.52 THOUSAND/UL (ref 0–1.22)
MONOCYTES NFR BLD: 8 % (ref 4–12)
NEUTROPHILS # BLD MANUAL: 4.95 THOUSAND/UL (ref 1.85–7.62)
NEUTS BAND NFR BLD MANUAL: 2 % (ref 0–8)
NEUTS SEG NFR BLD AUTO: 74 % (ref 43–75)
PLATELET # BLD AUTO: 18 THOUSANDS/UL (ref 149–390)
PLATELET BLD QL SMEAR: ABNORMAL
PMV BLD AUTO: 11 FL (ref 8.9–12.7)
POTASSIUM SERPL-SCNC: 4 MMOL/L (ref 3.5–5.3)
PROCALCITONIN SERPL-MCNC: 5.69 NG/ML
RBC # BLD AUTO: 2.59 MILLION/UL (ref 3.81–5.12)
SODIUM SERPL-SCNC: 142 MMOL/L (ref 136–145)
VANCOMYCIN TROUGH SERPL-MCNC: 21.4 UG/ML (ref 10–20)
WBC # BLD AUTO: 6.51 THOUSAND/UL (ref 4.31–10.16)

## 2022-05-12 PROCEDURE — 97110 THERAPEUTIC EXERCISES: CPT

## 2022-05-12 PROCEDURE — 97116 GAIT TRAINING THERAPY: CPT

## 2022-05-12 PROCEDURE — 80048 BASIC METABOLIC PNL TOTAL CA: CPT | Performed by: PHYSICIAN ASSISTANT

## 2022-05-12 PROCEDURE — 85027 COMPLETE CBC AUTOMATED: CPT | Performed by: PHYSICIAN ASSISTANT

## 2022-05-12 PROCEDURE — 85007 BL SMEAR W/DIFF WBC COUNT: CPT | Performed by: PHYSICIAN ASSISTANT

## 2022-05-12 PROCEDURE — 99232 SBSQ HOSP IP/OBS MODERATE 35: CPT | Performed by: INTERNAL MEDICINE

## 2022-05-12 PROCEDURE — 94640 AIRWAY INHALATION TREATMENT: CPT

## 2022-05-12 PROCEDURE — 94760 N-INVAS EAR/PLS OXIMETRY 1: CPT

## 2022-05-12 PROCEDURE — 85025 COMPLETE CBC W/AUTO DIFF WBC: CPT | Performed by: PHYSICIAN ASSISTANT

## 2022-05-12 PROCEDURE — 82948 REAGENT STRIP/BLOOD GLUCOSE: CPT

## 2022-05-12 PROCEDURE — 84145 PROCALCITONIN (PCT): CPT | Performed by: PHYSICIAN ASSISTANT

## 2022-05-12 PROCEDURE — 80202 ASSAY OF VANCOMYCIN: CPT | Performed by: INTERNAL MEDICINE

## 2022-05-12 RX ORDER — DIPHENHYDRAMINE HYDROCHLORIDE 50 MG/ML
25 INJECTION INTRAMUSCULAR; INTRAVENOUS EVERY 6 HOURS PRN
Status: DISCONTINUED | OUTPATIENT
Start: 2022-05-12 | End: 2022-05-14 | Stop reason: HOSPADM

## 2022-05-12 RX ORDER — AMOXICILLIN AND CLAVULANATE POTASSIUM 875; 125 MG/1; MG/1
1 TABLET, FILM COATED ORAL EVERY 12 HOURS SCHEDULED
Status: DISCONTINUED | OUTPATIENT
Start: 2022-05-12 | End: 2022-05-14 | Stop reason: HOSPADM

## 2022-05-12 RX ADMIN — MORPHINE SULFATE 5 MG: 100 SOLUTION ORAL at 22:07

## 2022-05-12 RX ADMIN — ACYCLOVIR 400 MG: 200 CAPSULE ORAL at 21:10

## 2022-05-12 RX ADMIN — FLUTICASONE PROPIONATE 2 SPRAY: 50 SPRAY, METERED NASAL at 09:43

## 2022-05-12 RX ADMIN — ACYCLOVIR 400 MG: 200 CAPSULE ORAL at 09:44

## 2022-05-12 RX ADMIN — METOPROLOL TARTRATE 25 MG: 25 TABLET, FILM COATED ORAL at 11:50

## 2022-05-12 RX ADMIN — OXYCODONE HYDROCHLORIDE AND ACETAMINOPHEN 500 MG: 500 TABLET ORAL at 09:44

## 2022-05-12 RX ADMIN — ALLOPURINOL 200 MG: 100 TABLET ORAL at 09:44

## 2022-05-12 RX ADMIN — INSULIN LISPRO 1 UNITS: 100 INJECTION, SOLUTION INTRAVENOUS; SUBCUTANEOUS at 11:50

## 2022-05-12 RX ADMIN — METOPROLOL TARTRATE 75 MG: 50 TABLET, FILM COATED ORAL at 21:08

## 2022-05-12 RX ADMIN — AMOXICILLIN AND CLAVULANATE POTASSIUM 1 TABLET: 875; 125 TABLET, FILM COATED ORAL at 22:07

## 2022-05-12 RX ADMIN — METOPROLOL TARTRATE 50 MG: 50 TABLET, FILM COATED ORAL at 09:44

## 2022-05-12 RX ADMIN — POTASSIUM CHLORIDE 40 MEQ: 20 TABLET, EXTENDED RELEASE ORAL at 17:06

## 2022-05-12 RX ADMIN — GABAPENTIN 100 MG: 100 CAPSULE ORAL at 17:06

## 2022-05-12 RX ADMIN — ACETAMINOPHEN 650 MG: 325 TABLET ORAL at 05:09

## 2022-05-12 RX ADMIN — IPRATROPIUM BROMIDE 0.25 MG: 0.5 SOLUTION RESPIRATORY (INHALATION) at 07:13

## 2022-05-12 RX ADMIN — CEFEPIME HYDROCHLORIDE 2000 MG: 2 INJECTION, POWDER, FOR SOLUTION INTRAVENOUS at 09:44

## 2022-05-12 RX ADMIN — POTASSIUM CHLORIDE 40 MEQ: 20 TABLET, EXTENDED RELEASE ORAL at 09:44

## 2022-05-12 RX ADMIN — ACYCLOVIR 400 MG: 200 CAPSULE ORAL at 05:04

## 2022-05-12 RX ADMIN — MORPHINE SULFATE 15 MG: 15 TABLET, EXTENDED RELEASE ORAL at 21:08

## 2022-05-12 RX ADMIN — B-COMPLEX W/ C & FOLIC ACID TAB 1 TABLET: TAB at 09:44

## 2022-05-12 RX ADMIN — IPRATROPIUM BROMIDE 0.25 MG: 0.5 SOLUTION RESPIRATORY (INHALATION) at 19:34

## 2022-05-12 RX ADMIN — MORPHINE SULFATE 15 MG: 15 TABLET, EXTENDED RELEASE ORAL at 09:44

## 2022-05-12 RX ADMIN — ACYCLOVIR 400 MG: 200 CAPSULE ORAL at 13:08

## 2022-05-12 RX ADMIN — VANCOMYCIN HYDROCHLORIDE 750 MG: 750 INJECTION, SOLUTION INTRAVENOUS at 13:08

## 2022-05-12 RX ADMIN — GABAPENTIN 100 MG: 100 CAPSULE ORAL at 09:44

## 2022-05-12 RX ADMIN — FLUTICASONE FUROATE AND VILANTEROL TRIFENATATE 1 PUFF: 100; 25 POWDER RESPIRATORY (INHALATION) at 09:43

## 2022-05-12 RX ADMIN — VANCOMYCIN HYDROCHLORIDE 750 MG: 750 INJECTION, SOLUTION INTRAVENOUS at 00:50

## 2022-05-12 RX ADMIN — ACYCLOVIR 400 MG: 200 CAPSULE ORAL at 17:06

## 2022-05-12 RX ADMIN — PANTOPRAZOLE SODIUM 20 MG: 20 TABLET, DELAYED RELEASE ORAL at 05:05

## 2022-05-12 NOTE — PROGRESS NOTES
[address] [facility]  Progress Note - Althea Garcia 1955, 78 y o  female MRN: [id number]  Unit/Bed#: E4 -01 Encounter: [id number]  Primary Care Provider: No primary care provider on file  Date and time admitted to hospital: 5/9/2022  5:42 AM    * Sepsis [facility])  Assessment & Plan  · Patient met sepsis criteria time of presentation with fever, tachycardia and leukocytosis  · Procalcitonin found to be elevated, slightly improved to 12 this morning  · Likely due to UTI given abnormal UA- urine culture growing E coli and Enterococcus  · Chest x-ray with evidence of possible left lower lobe opacity  · Continue cefepime day 4  · MRSA negative, but will continue vancomycin given enterococcus susceptible to vanco   · Patient has been afebrile for the past 48 hours   · Blood culture negative at 72 hours      Hypokalemia  Assessment & Plan  · Potassium 2 5 at time of admission with magnesium 0 8  · Both have been repleted throughout hospitalization  · Potassium improved to 4 0 this morning   · Continue potassium supplement 40 mg b i d    · Monitor daily     Acute encephalopathy  Assessment & Plan  · Resolved  · Likely secondary to sepsis  · Patient alert and oriented today    Continuous opioid dependence ([facility] )  Assessment & Plan  · Patient with chronic pain secondary to multiple myeloma  · Home regimen 15 mg MS Contin b i d  And morphine 10 mg p o  Q 6 hours p r n  · Given acute encephalopathy, doses were decreased  · Patient currently on 15 mg MS Contin b i d  And morphine 5 mg q 6 hours p r n    · Did have some symptoms of nausea and diarrhea during hospitalization, doing better back on home meds   · Seen in consult by palliative care         Type 2 diabetes mellitus [facility])  Assessment & Plan  Lab Results   Component Value Date    HGBA1C 6 2 (H) 05/09/2022       Recent Labs     05/11/22  1116 05/11/22  1600 05/11/22  2049 05/12/22  0705   POCGLU 169* 100 115 93       Blood Sugar Average: Last 72 hrs:  (P) 821 [id number]   · Hold oral metformin while inpatient  · [facility]iding [facility]    Chronic respiratory failure (HCC)  Assessment & Plan  · Patient maintained on 3 L nasal cannula oxygen due to history of COPD  · Initially was requiring 4 L nasal cannula in the emergency room but able to be weaned down to her home 3 L nasal cannula today  · Continue home inhalers and nebulizers  · Doing well on home O2     SVT (supraventricular tachycardia) ([facility] )  Assessment & Plan  · Patient did have evidence of SVT while in the emergency room with heart rate in the 180s  · Patient received adenosine and IV metoprolol and started on Cardizem drip which has since been discontinued  · Likely worsened by sepsis   · Maintain outpatient on lopressor 100 mg b i d , given hypotension, dose was decreased, will increase up to 75 mg BID today as BP allows     MIK (acute kidney injury) ([place] [address] )  Assessment & Plan  · Creatinine was found to be elevated at 1 2 at time of admission  · Resolved to 0 8    Ambulatory dysfunction  Assessment & Plan  · Seen in consult by Physical and Occupational therapy recommended rehab placement  · Plan for discharge back to rehab     Thrombocytopenia ([facility] )  Assessment & Plan  · Secondary to multiple myeloma  · Platelets 18, no evidence of active bleed    GERD (gastroesophageal reflux disease)  Assessment & Plan  · Continue daily PPI    Multiple myeloma ([place] [address] )  Assessment & Plan  · Known history of multiple myeloma with thrombocytopenia and anemia requiring blood transfusion yesterday  · Hemoglobin stable this AM, platelets 18, no signs of active bleed  · Continue outpatient Hematology-Oncology follow-up        VTE Pharmacologic Prophylaxis: VTE Score: 4 Moderate Risk (Score 3-4) - Pharmacological DVT Prophylaxis Contraindicated  Sequential Compression Devices Ordered  Patient Centered Rounds: I performed bedside rounds with nursing staff today    Discussions with Specialists or Other Care Team Provider:  Nursing, case management    Education and Discussions with Family / Patient: Updated  () via phone  Time Spent for Care: 30 minutes  More than 50% of total time spent on counseling and coordination of care as described above  Current Length of Stay: 3 day(s)  Current Patient Status: Inpatient   Certification Statement: The patient will continue to require additional inpatient hospital stay due to Sepsis due to UTI, continued mild tachycardia  Discharge Plan: Anticipate discharge in 24-48 hrs to rehab facility  Code Status: Level 1 - Full Code    Subjective:   Patient reports she is doing well today  Denies any pain or palpitations  Felt comfortable getting out of bed into the chair this morning  Eating and drinking better than yesterday  Denies any shortness of breath  Objective:     Vitals:   Temp (24hrs), Av 5 °F (36 4 °C), Min:96 9 °F (36 1 °C), Max:97 8 °F (36 6 °C)    Temp:  [96 9 °F (36 1 °C)-97 8 °F (36 6 °C)] 97 5 °F (36 4 °C)  HR:  [] 90  Resp:  [18-20] 18  BP: (100-134)/(52-78) 113/58  SpO2:  [95 %-99 %] 99 %  Body mass index is 36 98 kg/m²  Input and Output Summary (last 24 hours): Intake/Output Summary (Last 24 hours) at 2022 1047  Last data filed at 2022 0456  Gross per 24 hour   Intake 960 ml   Output 1850 ml   Net -890 ml       Physical Exam:   Physical Exam  Vitals reviewed  Constitutional:       General: She is not in acute distress  HENT:      Head: Normocephalic and atraumatic  Eyes:      General: No scleral icterus  Conjunctiva/sclera: Conjunctivae normal    Cardiovascular:      Rate and Rhythm: Tachycardia present  Rhythm irregular  Heart sounds: No murmur heard  Pulmonary:      Effort: Pulmonary effort is normal  No respiratory distress  Breath sounds: Normal breath sounds  Abdominal:      General: Bowel sounds are normal  There is no distension  Palpations: Abdomen is soft  Tenderness: There is no abdominal tenderness  Musculoskeletal:      Cervical back: Neck supple  Right lower leg: No edema  Left lower leg: No edema  Skin:     General: Skin is warm and dry  Neurological:      General: No focal deficit present  Mental Status: She is alert and oriented to person, place, and time  Psychiatric:         Mood and Affect: Mood normal          Behavior: Behavior normal           Additional Data:     Labs:  Results from last 7 days   Lab Units 05/12/22  0837   WBC Thousand/uL 6 51   HEMOGLOBIN g/dL 8 2*   HEMATOCRIT % 26 5*   PLATELETS Thousands/uL 18*   BANDS PCT % 2   LYMPHO PCT % 16   MONO PCT % 8   EOS PCT % 0     Results from last 7 days   Lab Units 05/12/22  0453 05/10/22  0609 05/09/22  0601   SODIUM mmol/L 142   < > 138   POTASSIUM mmol/L 4 0   < > 3 6   CHLORIDE mmol/L 111*   < > 102   CO2 mmol/L 23   < > 26   BUN mg/dL 9   < > 12   CREATININE mg/dL 0 81   < > 1 26   ANION GAP mmol/L 8   < > 10   CALCIUM mg/dL 9 6   < > 10 1   ALBUMIN g/dL  --   --  2 1*   TOTAL BILIRUBIN mg/dL  --   --  0 49   ALK PHOS U/L  --   --  154*   ALT U/L  --   --  33   AST U/L  --   --  34   GLUCOSE RANDOM mg/dL 75   < > 160*    < > = values in this interval not displayed       Results from last 7 days   Lab Units 05/09/22  0601   INR  1 21*     Results from last 7 days   Lab Units 05/12/22  0705 05/11/22  2049 05/11/22  1600 05/11/22  1116 05/11/22  0756 05/10/22  2034 05/10/22  1600 05/10/22  1137 05/10/22  0725 05/09/22  2038 05/09/22  1602 05/09/22  1145   POC GLUCOSE mg/dl 93 115 100 169* 107 131 127 165* 110 152* 218* 141*     Results from last 7 days   Lab Units 05/09/22  0439   HEMOGLOBIN A1C % 6 2*     Results from last 7 days   Lab Units 05/12/22  0453 05/11/22  0619 05/10/22  0609 05/09/22  1330 05/09/22  1146 05/09/22  0947 05/09/22  0752 05/09/22  0601   LACTIC ACID mmol/L  --   --   --  1 4 3 0* 2 4* 3 2* 3 8*   PROCALCITONIN ng/ml 5 69* 12 68* 21 88*  --   --   -- --  9 60*       Lines/Drains:  Invasive Devices  Report    Peripheral Intravenous Line  Duration           Peripheral IV 05/09/22 Left Antecubital 3 days    Peripheral IV 05/09/22 Right Wrist 3 days          Drain  Duration           Urethral Catheter Temperature probe 16 Fr  3 days              Urinary Catheter:  Goal for removal: N/A - Chronic Lee           Telemetry:  Telemetry Orders (From admission, onward)             48 Hour Telemetry Monitoring  Continuous x 48 hours        References:    Telemetry Guidelines   Question:  Reason for 48 Hour Telemetry  Answer:  Arrhythmias Requiring Medical Therapy (eg  SVT, Vtach/fib, Bradycardia, Uncontrolled A-fib)                 Telemetry Reviewed: Atrial fibrillation  HR averaging 110  Indication for Continued Telemetry Use: Arrthymias requiring medical therapy             Imaging: No pertinent imaging reviewed  Recent Cultures (last 7 days):   Results from last 7 days   Lab Units 05/09/22  0616 05/09/22  0615 05/09/22  8948   BLOOD CULTURE  No Growth at 72 hrs   --  No Growth at 72 hrs     URINE CULTURE   --  >100,000 cfu/ml Escherichia coli*  >100,000 cfu/ml Enterococcus faecalis*  --        Last 24 Hours Medication List:   Current Facility-Administered Medications   Medication Dose Route Frequency Provider Last Rate    acetaminophen  650 mg Oral Q6H PRN Gloria Nurse, DO      acyclovir  400 mg Oral Q4H While Awake Edelmira Ambron, DO      allopurinol  200 mg Oral Daily Edelmira Ambron, DO      ascorbic acid  500 mg Oral Daily Edelmira Ambron, DO      cefepime  2,000 mg Intravenous Q12H Edelmira Ambron, DO 2,000 mg (05/12/22 0944)    fluticasone  2 spray Nasal Daily Edelmira Ambron, DO      fluticasone-vilanterol  1 puff Inhalation Daily Edelmira Ambron, DO      gabapentin  100 mg Oral BID Edelmira Ambron, DO      insulin lispro  1-5 Units Subcutaneous TID AC Edelmira Ambron, DO      insulin lispro  1-5 Units Subcutaneous HS Edelmira Ambron, DO      ipratropium  0 25 mg Nebulization BID Edelmira Ambron, DO      levalbuterol  0 63 mg Nebulization Q8H PRN Edelmira Ambron, DO      LORazepam  0 5 mg Oral Q6H PRN Geovani Foil, DO      met[facility] tartrate  [facility] Oral Once [facility]      metoprolol tartrate  75 mg Oral Q12H [address] [facility], KYLE      morphine  15 mg Oral Q12H [address] Edelmira Ambron, DO      Morphine Sulfate (Concentrate)  5 mg Oral Q6H PRN Geovani Foil, DO      multivitamin stress formula  1 tablet Oral Daily Edelmira Ambron, DO      naloxone  0 04 mg Intravenous Q1MIN PRN Edelmira Ambron, DO      ondansetron  4 mg Intravenous Q6H PRN Edelmira Ambron, DO      pantoprazole  20 mg Oral Early Morning Edelmira Ambron, DO      potassium chloride  40 mEq Oral BID Edelmira Ambron, DO      senna  1 tablet Oral HS Edelmira Ambron, DO      vancomycin  750 mg Intravenous Q12H Edelmira Ambron, DO Stopped (05/12/22 0200)        Today, Patient Was Seen By: Georgie Arriaga PA-C    **Please Note: This note may have been constructed using a voice recognition system  **

## 2022-05-12 NOTE — ASSESSMENT & PLAN NOTE
· Known history of multiple myeloma with thrombocytopenia and anemia requiring blood transfusion yesterday  · Hemoglobin stable this AM, platelets 18, no signs of active bleed  · Continue outpatient Hematology-Oncology follow-up

## 2022-05-12 NOTE — PLAN OF CARE
Problem: MOBILITY - ADULT  Goal: Maintain or return to baseline ADL function  Description: INTERVENTIONS:  -  Assess patient's ability to carry out ADLs; assess patient's baseline for ADL function and identify physical deficits which impact ability to perform ADLs (bathing, care of mouth/teeth, toileting, grooming, dressing, etc )  - Assess/evaluate cause of self-care deficits   - Assess range of motion  - Assess patient's mobility; develop plan if impaired  - Assess patient's need for assistive devices and provide as appropriate  - Encourage maximum independence but intervene and supervise when necessary  - Involve family in performance of ADLs  - Assess for home care needs following discharge   - Consider OT consult to assist with ADL evaluation and planning for discharge  - Provide patient education as appropriate  Outcome: Progressing  Goal: Maintains/Returns to pre admission functional level  Description: INTERVENTIONS:  - Perform BMAT or MOVE assessment daily    - Set and communicate daily mobility goal to care team and patient/family/caregiver  - Collaborate with rehabilitation services on mobility goals if consulted  - Perform Range of Motion 3 times a day  - Reposition patient every 2 hours    - Dangle patient 3 times a day  - Stand patient 3 times a day  - Ambulate patient 3 times a day  - Out of bed to chair 3 times a day   - Out of bed for meals 3 times a day  - Out of bed for toileting  - Record patient progress and toleration of activity level   Outcome: Progressing     Problem: Prexisting or High Potential for Compromised Skin Integrity  Goal: Skin integrity is maintained or improved  Description: INTERVENTIONS:  - Identify patients at risk for skin breakdown  - Assess and monitor skin integrity  - Assess and monitor nutrition and hydration status  - Monitor labs   - Assess for incontinence   - Turn and reposition patient  - Assist with mobility/ambulation  - Relieve pressure over bony prominences  - Avoid friction and shearing  - Provide appropriate hygiene as needed including keeping skin clean and dry  - Evaluate need for skin moisturizer/barrier cream  - Collaborate with interdisciplinary team   - Patient/family teaching  - Consider wound care consult   Outcome: Progressing     Problem: Potential for Falls  Goal: Patient will remain free of falls  Description: INTERVENTIONS:  - Educate patient/family on patient safety including physical limitations  - Instruct patient to call for assistance with activity   - Consult OT/PT to assist with strengthening/mobility   - Keep Call bell within reach  - Keep bed low and locked with side rails adjusted as appropriate  - Keep care items and personal belongings within reach  - Initiate and maintain comfort rounds  - Make Fall Risk Sign visible to staff  - Offer Toileting every 2 Hours, in advance of need  - Initiate/Maintain bed alarm  - Obtain necessary fall risk management equipment: bed alarm   - Apply yellow socks and bracelet for high fall risk patients  - Consider moving patient to room near nurses station  Outcome: Progressing     Problem: PAIN - ADULT  Goal: Verbalizes/displays adequate comfort level or baseline comfort level  Description: Interventions:  - Encourage patient to monitor pain and request assistance  - Assess pain using appropriate pain scale  - Administer analgesics based on type and severity of pain and evaluate response  - Implement non-pharmacological measures as appropriate and evaluate response  - Consider cultural and social influences on pain and pain management  - Notify physician/advanced practitioner if interventions unsuccessful or patient reports new pain  Outcome: Progressing     Problem: INFECTION - ADULT  Goal: Absence or prevention of progression during hospitalization  Description: INTERVENTIONS:  - Assess and monitor for signs and symptoms of infection  - Monitor lab/diagnostic results  - Monitor all insertion sites, i e  indwelling lines, tubes, and drains  - Monitor endotracheal if appropriate and nasal secretions for changes in amount and color  - [place] appropriate cooling/warming therapies per order  - Administer medications as ordered  - Instruct and encourage patient and family to use good hand hygiene technique  - Identify and instruct in appropriate isolation precautions for identified infection/condition  Outcome: Progressing  Goal: Absence of fever/infection during neutropenic period  Description: INTERVENTIONS:  - Monitor WBC    Outcome: Progressing     Problem: SAFETY ADULT  Goal: Maintain or return to baseline ADL function  Description: INTERVENTIONS:  -  Assess patient's ability to carry out ADLs; assess patient's baseline for ADL function and identify physical deficits which impact ability to perform ADLs (bathing, care of mouth/teeth, toileting, grooming, dressing, etc )  - Assess/evaluate cause of self-care deficits   - Assess range of motion  - Assess patient's mobility; develop plan if impaired  - Assess patient's need for assistive devices and provide as appropriate  - Encourage maximum independence but intervene and supervise when necessary  - Involve family in performance of ADLs  - Assess for home care needs following discharge   - Consider OT consult to assist with ADL evaluation and planning for discharge  - Provide patient education as appropriate  Outcome: Progressing  Goal: Maintains/Returns to pre admission functional level  Description: INTERVENTIONS:  - Perform BMAT or MOVE assessment daily    - Set and communicate daily mobility goal to care team and patient/family/caregiver  - Collaborate with rehabilitation services on mobility goals if consulted  - Perform Range of Motion 3 times a day  - Reposition patient every 2 hours    - Dangle patient 3 times a day  - Stand patient 3 times a day  - Ambulate patient 3 times a day  - Out of bed to chair 3 times a day   - Out of bed for meals 3 times a day  - Out of bed for toileting  - Record patient progress and toleration of activity level   Outcome: Progressing  Goal: Patient will remain free of falls  Description: INTERVENTIONS:  - Educate patient/family on patient safety including physical limitations  - Instruct patient to call for assistance with activity   - Consult OT/PT to assist with strengthening/mobility   - Keep Call bell within reach  - Keep bed low and locked with side rails adjusted as appropriate  - Keep care items and personal belongings within reach  - Initiate and maintain comfort rounds  - Make Fall Risk Sign visible to staff  - Offer Toileting every 2 Hours, in advance of need  - Initiate/Maintain bed alarm  - Obtain necessary fall risk management equipment: bed alarm   - Apply yellow socks and bracelet for high fall risk patients  - Consider moving patient to room near nurses station  Outcome: Progressing     Problem: DISCHARGE PLANNING  Goal: Discharge to home or other facility with appropriate resources  Description: INTERVENTIONS:  - Identify barriers to discharge w/patient and caregiver  - Arrange for needed discharge resources and transportation as appropriate  - Identify discharge learning needs (meds, wound care, etc )  - Arrange for interpretive services to assist at discharge as needed  - Refer to Case Management Department for coordinating discharge planning if the patient needs post-hospital services based on physician/advanced practitioner order or complex needs related to functional status, cognitive ability, or social support system  Outcome: Progressing     Problem: Knowledge Deficit  Goal: Patient/family/caregiver demonstrates understanding of disease process, treatment plan, medications, and discharge instructions  Description: Complete learning assessment and assess knowledge base    Interventions:  - Provide teaching at level of understanding  - Provide teaching via preferred learning methods  Outcome: Progressing     Problem: Nutrition/Hydration-ADULT  Goal: Nutrient/Hydration intake appropriate for improving, restoring or maintaining nutritional needs  Description: Monitor and assess patient's nutrition/hydration status for malnutrition  Collaborate with interdisciplinary team and initiate plan and interventions as ordered  Monitor patient's weight and dietary intake as ordered or per policy  Utilize nutrition screening tool and intervene as necessary  Determine patient's food preferences and provide high-protein, high-caloric foods as appropriate       INTERVENTIONS:  - Monitor oral intake, urinary output, labs, and treatment plans  - Assess nutrition and hydration status and recommend course of action  - Evaluate amount of meals eaten  - Assist patient with eating if necessary   - Allow adequate time for meals  - Recommend/ encourage appropriate diets, oral nutritional supplements, and vitamin/mineral supplements  - Order, calculate, and assess calorie counts as needed  - Recommend, monitor, and adjust tube feedings and TPN/PPN based on assessed needs  - Assess need for intravenous fluids  - Provide specific nutrition/hydration education as appropriate  - Include patient/family/caregiver in decisions related to nutrition  Outcome: Progressing     Problem: NEUROSENSORY - ADULT  Goal: Achieves stable or improved neurological status  Description: INTERVENTIONS  - Monitor and report changes in neurological status  - Monitor vital signs such as temperature, blood pressure, glucose, and any other labs ordered   - Initiate measures to prevent increased intracranial pressure  - Monitor for seizure activity and implement precautions if appropriate      Outcome: Progressing     Problem: CARDIOVASCULAR - ADULT  Goal: Maintains optimal cardiac output and hemodynamic stability  Description: INTERVENTIONS:  - Monitor I/O, vital signs and rhythm  - Monitor for S/S and trends of decreased cardiac output  - Administer and titrate ordered vasoactive medications to optimize hemodynamic stability  - Assess quality of pulses, skin color and temperature  - Assess for signs of decreased coronary artery perfusion  - Instruct patient to report change in severity of symptoms  Outcome: Progressing  Goal: Absence of cardiac dysrhythmias or at baseline rhythm  Description: INTERVENTIONS:  - Continuous cardiac monitoring, vital signs, obtain 12 lead EKG if ordered  - Administer antiarrhythmic and heart rate control medications as ordered  - Monitor electrolytes and administer replacement therapy as ordered  Outcome: Progressing     Problem: RESPIRATORY - ADULT  Goal: Achieves optimal ventilation and oxygenation  Description: INTERVENTIONS:  - Assess for changes in respiratory status  - Assess for changes in mentation and behavior  - Position to facilitate oxygenation and minimize respiratory effort  - Oxygen administered by appropriate delivery if ordered  - Initiate smoking cessation education as indicated  - Encourage broncho-pulmonary hygiene including cough, deep breathe, Incentive Spirometry  - Assess the need for suctioning and aspirate as needed  - Assess and instruct to report SOB or any respiratory difficulty  - Respiratory Therapy support as indicated  Outcome: Progressing     Problem: GENITOURINARY - ADULT  Goal: Urinary catheter remains patent  Description: INTERVENTIONS:  - Assess patency of urinary catheter  - If patient has a chronic costello, consider changing catheter if non-functioning  - Follow guidelines for intermittent irrigation of non-functioning urinary catheter  Outcome: Progressing     Problem: METABOLIC, FLUID AND ELECTROLYTES - ADULT  Goal: Electrolytes maintained within normal limits  Description: INTERVENTIONS:  - Monitor labs and assess patient for signs and symptoms of electrolyte imbalances  - Administer electrolyte replacement as ordered  - Monitor response to electrolyte replacements, including repeat lab results as appropriate  - Instruct patient on fluid and nutrition as appropriate  Outcome: Progressing          Problem: HEMATOLOGIC - ADULT  Goal: Maintains hematologic stability  Description: INTERVENTIONS  - Assess for signs and symptoms of bleeding or hemorrhage  - Monitor labs  - Administer supportive blood products/factors as ordered and appropriate  Outcome: Progressing

## 2022-05-12 NOTE — PLAN OF CARE
Problem: MOBILITY - ADULT  Goal: Maintain or return to baseline ADL function  Description: INTERVENTIONS:  -  Assess patient's ability to carry out ADLs; assess patient's baseline for ADL function and identify physical deficits which impact ability to perform ADLs (bathing, care of mouth/teeth, toileting, grooming, dressing, etc )  - Assess/evaluate cause of self-care deficits   - Assess range of motion  - Assess patient's mobility; develop plan if impaired  - Assess patient's need for assistive devices and provide as appropriate  - Encourage maximum independence but intervene and supervise when necessary  - Involve family in performance of ADLs  - Assess for home care needs following discharge   - Consider OT consult to assist with ADL evaluation and planning for discharge  - Provide patient education as appropriate  Outcome: Progressing  Goal: Maintains/Returns to pre admission functional level  Description: INTERVENTIONS:  - Perform BMAT or MOVE assessment daily    - Set and communicate daily mobility goal to care team and patient/family/caregiver  - Collaborate with rehabilitation services on mobility goals if consulted  - Perform Range of Motion 3 times a day  - Reposition patient every 2 hours    - Dangle patient 3 times a day  - Stand patient 3 times a day  - Ambulate patient 3 times a day  - Out of bed to chair 3 times a day   - Out of bed for meals 3 times a day  - Out of bed for toileting  - Record patient progress and toleration of activity level   Outcome: Progressing     Problem: Prexisting or High Potential for Compromised Skin Integrity  Goal: Skin integrity is maintained or improved  Description: INTERVENTIONS:  - Identify patients at risk for skin breakdown  - Assess and monitor skin integrity  - Assess and monitor nutrition and hydration status  - Monitor labs   - Assess for incontinence   - Turn and reposition patient  - Assist with mobility/ambulation  - Relieve pressure over bony prominences  - Avoid friction and shearing  - Provide appropriate hygiene as needed including keeping skin clean and dry  - Evaluate need for skin moisturizer/barrier cream  - Collaborate with interdisciplinary team   - Patient/family teaching  - Consider wound care consult   Outcome: Progressing     Problem: Potential for Falls  Goal: Patient will remain free of falls  Description: INTERVENTIONS:  - Educate patient/family on patient safety including physical limitations  - Instruct patient to call for assistance with activity   - Consult OT/PT to assist with strengthening/mobility   - Keep Call bell within reach  - Keep bed low and locked with side rails adjusted as appropriate  - Keep care items and personal belongings within reach  - Initiate and maintain comfort rounds  - Make Fall Risk Sign visible to staff  - Offer Toileting every 2 Hours, in advance of need  - Initiate/Maintain bed alarm  - Obtain necessary fall risk management equipment: alarms  - Apply yellow socks and bracelet for high fall risk patients  - Consider moving patient to room near nurses station  Outcome: Progressing     Problem: PAIN - ADULT  Goal: Verbalizes/displays adequate comfort level or baseline comfort level  Description: Interventions:  - Encourage patient to monitor pain and request assistance  - Assess pain using appropriate pain scale  - Administer analgesics based on type and severity of pain and evaluate response  - Implement non-pharmacological measures as appropriate and evaluate response  - Consider cultural and social influences on pain and pain management  - Notify physician/advanced practitioner if interventions unsuccessful or patient reports new pain  Outcome: Progressing     Problem: INFECTION - ADULT  Goal: Absence or prevention of progression during hospitalization  Description: INTERVENTIONS:  - Assess and monitor for signs and symptoms of infection  - Monitor lab/diagnostic results  - Monitor all insertion sites, i e  indwelling lines, tubes, and drains  - Monitor endotracheal if appropriate and nasal secretions for changes in amount and color  - [place] appropriate cooling/warming therapies per order  - Administer medications as ordered  - Instruct and encourage patient and family to use good hand hygiene technique  - Identify and instruct in appropriate isolation precautions for identified infection/condition  Outcome: Progressing  Goal: Absence of fever/infection during neutropenic period  Description: INTERVENTIONS:  - Monitor WBC    Outcome: Progressing     Problem: SAFETY ADULT  Goal: Maintain or return to baseline ADL function  Description: INTERVENTIONS:  -  Assess patient's ability to carry out ADLs; assess patient's baseline for ADL function and identify physical deficits which impact ability to perform ADLs (bathing, care of mouth/teeth, toileting, grooming, dressing, etc )  - Assess/evaluate cause of self-care deficits   - Assess range of motion  - Assess patient's mobility; develop plan if impaired  - Assess patient's need for assistive devices and provide as appropriate  - Encourage maximum independence but intervene and supervise when necessary  - Involve family in performance of ADLs  - Assess for home care needs following discharge   - Consider OT consult to assist with ADL evaluation and planning for discharge  - Provide patient education as appropriate  Outcome: Progressing  Goal: Maintains/Returns to pre admission functional level  Description: INTERVENTIONS:  - Perform BMAT or MOVE assessment daily    - Set and communicate daily mobility goal to care team and patient/family/caregiver  - Collaborate with rehabilitation services on mobility goals if consulted  - Perform Range of Motion 3 times a day  - Reposition patient every 2 hours    - Dangle patient 3 times a day  - Stand patient 3 times a day  - Ambulate patient 3 times a day  - Out of bed to chair 3 times a day   - Out of bed for meals 3 times a day  - Out of bed for toileting  - Record patient progress and toleration of activity level   Outcome: Progressing  Goal: Patient will remain free of falls  Description: INTERVENTIONS:  - Educate patient/family on patient safety including physical limitations  - Instruct patient to call for assistance with activity   - Consult OT/PT to assist with strengthening/mobility   - Keep Call bell within reach  - Keep bed low and locked with side rails adjusted as appropriate  - Keep care items and personal belongings within reach  - Initiate and maintain comfort rounds  - Make Fall Risk Sign visible to staff  - Offer Toileting every 2 Hours, in advance of need  - Initiate/Maintain bed alarm  - Obtain necessary fall risk management equipment: alarms  - Apply yellow socks and bracelet for high fall risk patients  - Consider moving patient to room near nurses station  Outcome: Progressing     Problem: DISCHARGE PLANNING  Goal: Discharge to home or other facility with appropriate resources  Description: INTERVENTIONS:  - Identify barriers to discharge w/patient and caregiver  - Arrange for needed discharge resources and transportation as appropriate  - Identify discharge learning needs (meds, wound care, etc )  - Arrange for interpretive services to assist at discharge as needed  - Refer to Case Management Department for coordinating discharge planning if the patient needs post-hospital services based on physician/advanced practitioner order or complex needs related to functional status, cognitive ability, or social support system  Outcome: Progressing     Problem: Knowledge Deficit  Goal: Patient/family/caregiver demonstrates understanding of disease process, treatment plan, medications, and discharge instructions  Description: Complete learning assessment and assess knowledge base    Interventions:  - Provide teaching at level of understanding  - Provide teaching via preferred learning methods  Outcome: Progressing Problem: Nutrition/Hydration-ADULT  Goal: Nutrient/Hydration intake appropriate for improving, restoring or maintaining nutritional needs  Description: Monitor and assess patient's nutrition/hydration status for malnutrition  Collaborate with interdisciplinary team and initiate plan and interventions as ordered  Monitor patient's weight and dietary intake as ordered or per policy  Utilize nutrition screening tool and intervene as necessary  Determine patient's food preferences and provide high-protein, high-caloric foods as appropriate       INTERVENTIONS:  - Monitor oral intake, urinary output, labs, and treatment plans  - Assess nutrition and hydration status and recommend course of action  - Evaluate amount of meals eaten  - Assist patient with eating if necessary   - Allow adequate time for meals  - Recommend/ encourage appropriate diets, oral nutritional supplements, and vitamin/mineral supplements  - Order, calculate, and assess calorie counts as needed  - Recommend, monitor, and adjust tube feedings and TPN/PPN based on assessed needs  - Assess need for intravenous fluids  - Provide specific nutrition/hydration education as appropriate  - Include patient/family/caregiver in decisions related to nutrition  Outcome: Progressing     Problem: NEUROSENSORY - ADULT  Goal: Achieves stable or improved neurological status  Description: INTERVENTIONS  - Monitor and report changes in neurological status  - Monitor vital signs such as temperature, blood pressure, glucose, and any other labs ordered   - Initiate measures to prevent increased intracranial pressure  - Monitor for seizure activity and implement precautions if appropriate      Outcome: Progressing     Problem: CARDIOVASCULAR - ADULT  Goal: Maintains optimal cardiac output and hemodynamic stability  Description: INTERVENTIONS:  - Monitor I/O, vital signs and rhythm  - Monitor for S/S and trends of decreased cardiac output  - Administer and titrate ordered vasoactive medications to optimize hemodynamic stability  - Assess quality of pulses, skin color and temperature  - Assess for signs of decreased coronary artery perfusion  - Instruct patient to report change in severity of symptoms  Outcome: Progressing  Goal: Absence of cardiac dysrhythmias or at baseline rhythm  Description: INTERVENTIONS:  - Continuous cardiac monitoring, vital signs, obtain 12 lead EKG if ordered  - Administer antiarrhythmic and heart rate control medications as ordered  - Monitor electrolytes and administer replacement therapy as ordered  Outcome: Progressing     Problem: RESPIRATORY - ADULT  Goal: Achieves optimal ventilation and oxygenation  Description: INTERVENTIONS:  - Assess for changes in respiratory status  - Assess for changes in mentation and behavior  - Position to facilitate oxygenation and minimize respiratory effort  - Oxygen administered by appropriate delivery if ordered  - Initiate smoking cessation education as indicated  - Encourage broncho-pulmonary hygiene including cough, deep breathe, Incentive Spirometry  - Assess the need for suctioning and aspirate as needed  - Assess and instruct to report SOB or any respiratory difficulty  - Respiratory Therapy support as indicated  Outcome: Progressing     Problem: GENITOURINARY - ADULT  Goal: Urinary catheter remains patent  Description: INTERVENTIONS:  - Assess patency of urinary catheter  - If patient has a chronic costello, consider changing catheter if non-functioning  - Follow guidelines for intermittent irrigation of non-functioning urinary catheter  Outcome: Progressing     Problem: METABOLIC, FLUID AND ELECTROLYTES - ADULT  Goal: Electrolytes maintained within normal limits  Description: INTERVENTIONS:  - Monitor labs and assess patient for signs and symptoms of electrolyte imbalances  - Administer electrolyte replacement as ordered  - Monitor response to electrolyte replacements, including repeat lab results as appropriate  - Instruct patient on fluid and nutrition as appropriate  Outcome: Progressing     Problem: SKIN/TISSUE INTEGRITY - ADULT  Goal: Skin Integrity remains intact(Skin Breakdown Prevention)  Description: Assess:  -Perform Boo assessment every shift  -Clean and moisturize skin every 4 hours  -Inspect skin when repositioning, toileting, and assisting with ADLS  -Assess under medical devices such as telemetry every 4 hours  -Assess extremities for adequate circulation and sensation     Bed Management:  -Have minimal linens on bed & keep smooth, unwrinkled  -Change linens as needed when moist or perspiring  -Avoid sitting or lying in one position for more than 2 hours while in bed  -Keep HOB at 30 degrees     Toileting:  -Offer bedside commode  -Assess for incontinence every 2 hours  -Use incontinent care products after each incontinent episode such as cleansing foam    Activity:  -Mobilize patient 3 times a day  -Encourage activity and walks on unit  -Encourage or provide ROM exercises   -Turn and reposition patient every 2 Hours  -Use appropriate equipment to lift or move patient in bed  -Instruct/ Assist with weight shifting every 120 mins when out of bed in chair  -Consider limitation of chair time 2 hour intervals    Skin Care:  -Avoid use of baby powder, tape, friction and shearing, hot water or constrictive clothing  -Relieve pressure over bony prominences using wedges  -Do not massage red bony areas    Next Steps:  -Teach patient strategies to minimize risks such as weight shifting   -Consider consults to  interdisciplinary teams such as wound care and geriatrics  Outcome: Progressing     Problem: HEMATOLOGIC - ADULT  Goal: Maintains hematologic stability  Description: INTERVENTIONS  - Assess for signs and symptoms of bleeding or hemorrhage  - Monitor labs  - Administer supportive blood products/factors as ordered and appropriate  Outcome: Progressing

## 2022-05-12 NOTE — ASSESSMENT & PLAN NOTE
· Potassium 2 5 at time of admission with magnesium 0 8  · Both have been repleted throughout hospitalization  · Potassium improved to 4 0 this morning   · Continue potassium supplement 40 mg b i d    · Monitor daily

## 2022-05-12 NOTE — ASSESSMENT & PLAN NOTE
· Patient maintained on 3 L nasal cannula oxygen due to history of COPD  · Initially was requiring 4 L nasal cannula in the emergency room but able to be weaned down to her home 3 L nasal cannula today  · Continue home inhalers and nebulizers  · Doing well on home O2

## 2022-05-12 NOTE — PLAN OF CARE
Problem: PHYSICAL THERAPY ADULT  Goal: Performs mobility at highest level of function for planned discharge setting  See evaluation for individualized goals  Description: Treatment/Interventions: Functional transfer training,Elevations,LE strengthening/ROM,Therapeutic exercise,Endurance training,Patient/family training,Bed mobility,Gait training,Spoke to nursing          See flowsheet documentation for full assessment, interventions and recommendations  Note: Prognosis: Fair  Problem List: Decreased strength, Decreased endurance, Impaired balance, Decreased mobility, Decreased cognition, Impaired judgement, Decreased safety awareness, Pain  Assessment: Pt seen for PT session  Pt agreeable to amb from chair to bed  Pt's main complaints include back pain and feeling tired  Pt required modx1 for bed mobility and transfers, and minAx2 for ambulation  Pt amb from bedside chair to other side of bed w/ RW and minAx2  Pt required max VCs to keep stay within RW HORTENCIA  Pt w/ SOB following amb, SpO2 % on 3L NC  Pt demonstrated adequate b/l LE strength during exercises, but required VC and TC to initiate all exercises  Pt demonstrated fatigue due to generalized weakness and decreased endurance  The patient's AM-PAC Basic Mobility Inpatient Short Form Raw Score is 12  A Raw score of less than or equal to 16 suggests the patient may benefit from discharge to post-acute rehabilitation services  Please also refer to the recommendation of the Physical Therapist for safe discharge planning  Recommend return to previous STR at d/c in order to maximize functional outcomes and mobility  Pt educated on importance of mobility and HEP; pt demonstrated understanding with HEP  At end of session, pt medically stable w/ lines intact, bed alarm activated, and all needs within reach  Nsg notified    Barriers to Discharge: None  Barriers to Discharge Comments:  (Per CM, return to previous STR w/ rehab services)     PT Discharge Recommendation: Post acute rehabilitation services (Per CM, return to previous STR w/ rehab services)          See flowsheet documentation for full assessment

## 2022-05-12 NOTE — ASSESSMENT & PLAN NOTE
· Patient met sepsis criteria time of presentation with fever, tachycardia and leukocytosis  · Procalcitonin found to be elevated, slightly improved to 12 this morning  · Likely due to UTI given abnormal UA- urine culture growing E coli and Enterococcus  · Chest x-ray with evidence of possible left lower lobe opacity  · Continue cefepime day 4  · MRSA negative, but will continue vancomycin given enterococcus susceptible to vanco   · Patient has been afebrile for the past 48 hours   · Blood culture negative at 72 hours

## 2022-05-12 NOTE — ASSESSMENT & PLAN NOTE
· Patient did have evidence of SVT while in the emergency room with heart rate in the 180s  · Patient received adenosine and IV metoprolol and started on Cardizem drip which has since been discontinued  · Likely worsened by sepsis   · Maintain outpatient on lopressor 100 mg b i d , given hypotension, dose was decreased, will increase up to 75 mg BID today as BP allows

## 2022-05-12 NOTE — CONSULTS
Vancomycin IV Pharmacy-to-Dose Consultation     Jessica León is a 78 y o  female who is currently receiving Vancomycin IV with management by the Pharmacy Consult service  Vancomycin has been discontinued  Closing Pharmacy to Dose consult

## 2022-05-12 NOTE — ASSESSMENT & PLAN NOTE
· Patient with chronic pain secondary to multiple myeloma  · Home regimen 15 mg MS Contin b i d  And morphine 10 mg p o  Q 6 hours p r n  · Given acute encephalopathy, doses were decreased  · Patient currently on 15 mg MS Contin b i d  And morphine 5 mg q 6 hours p r n    · Did have some symptoms of nausea and diarrhea during hospitalization, doing better back on home meds   · Seen in consult by palliative care

## 2022-05-12 NOTE — ASSESSMENT & PLAN NOTE
· Seen in consult by Physical and Occupational therapy recommended rehab placement  · Plan for discharge back to rehab

## 2022-05-12 NOTE — ASSESSMENT & PLAN NOTE
Lab Results   Component Value Date    HGBA1C 6 2 (H) 05/09/2022       Recent Labs     05/11/22  1116 05/11/22  1600 05/11/22 2049 05/12/22  0705   POCGLU 169* 100 115 93       Blood Sugar Average: Last 72 hrs:  (P) 569 [id number]   · Hold oral metformin while inpatient  · Sliding scale insulin coverage with Accu-Cheks

## 2022-05-12 NOTE — PHYSICAL THERAPY NOTE
PT PROGRESS NOTE    Name: Nicole Hanley  AGE: 78 y o  MRN: [id number]  LENGTH OF STAY: 3    Admitting Dx:  Palpitations [R00 2]  UTI (urinary tract infection) [N39 0]  Tachycardia [R00 0]  Sepsis ([facility] ) [A41 9]      Patient Active Problem List   Diagnosis    Multifocal pneumonia    Multiple myeloma ([place] [address] )    Hypertension    GERD (gastroesophageal reflux disease)    Thrombocytopenia (HCC)    Asthma    Pressure injury of right buttock, unstageable (HCC)    Pressure injury of left buttock, stage 2 ([facility])    Ambulatory dysfunction    MIK (acute kidney injury) ([place] [address] )    SVT (supraventricular tachycardia) ([facility])    Chronic respiratory failure ([facility])    Type 2 diabetes mellitus ([place] [address] )    Sepsis ([place] [address] )    Continuous opioid dependence ([place] [address] )    Acute encephalopathy    Hypokalemia               05/12/22 1145   PT Last Visit   PT Visit Date 05/12/22   Pain Assessment   Pain Assessment Tool 0-10   Pain Score 6   Pain Location/Orientation Location: Back   Hospital Pain Intervention(s) Repositioned; Ambulation/increased activity; Emotional support; Rest   Restrictions/Precautions   Weight Bearing Precautions Per Order No   Other Precautions Cognitive; Chair Alarm; Bed Alarm;Multiple lines;O2;Telemetry; Fall Risk;Pain  (O2 3L NC)   General   Family/Caregiver Present No   Cognition   Overall Cognitive Status Impaired   Arousal/Participation Alert   Attention Attends with cues to redirect   Orientation Level Oriented to person;Oriented to place;Oriented to time   Following Commands Follows one step commands with increased time or repetition   Comments Pt cooperative to PT session despite requiring encouragement   Subjective   Subjective States she has been out of bed all day   Bed Mobility   Supine to Sit 3  Moderate assistance   Additional items Assist x 1;HOB elevated; Increased time required;LE management   Additional Comments Pt OOB in chair at beginning and session and supine in bed at end of session   Transfers   Sit to Stand 3  Moderate assistance   Additional items Assist x 1; Armrests; Increased time required;Verbal cues  (w/ RW )   Stand to Sit 3  Moderate assistance   Additional items Assist x 1; Increased time required;Verbal cues  (w/ RW)   Additional Comments VCs for hand placement  Sit<>stand x2; at chair and EOB   Ambulation/Elevation   Gait pattern Wide HORTENCIA; Short stride; Excessively slow;Decreased heel strike;Decreased foot clearance   Gait Assistance 4  Minimal assist   Additional items Assist x 2;Verbal cues   Assistive Device Rolling walker   Distance 10'x1   Ambulation/Elevation Additional Comments Pt amb from bedside chair to other side of bed  VCs for hand placement and to remain inside RW HORTENCIA  Balance   Static Sitting Fair   Dynamic Sitting Fair -   Static Standing Poor +   Dynamic Standing Poor   Ambulatory Poor   Endurance Deficit   Endurance Deficit Yes   Endurance Deficit Description 2* to fatigue   Activity Tolerance   Activity Tolerance Patient limited by fatigue   Nurse Made Aware JOSLYN Chawla   Assessment   Prognosis Fair   Problem List Decreased strength;Decreased endurance; Impaired balance;Decreased mobility; Decreased cognition; Impaired judgement;Decreased safety awareness;Pain   Assessment Pt seen for PT session  Pt agreeable to amb from chair to bed  Pt's main complaints include back pain and feeling tired  Pt required modx1 for bed mobility and transfers, and minAx2 for ambulation  Pt amb from bedside chair to other side of bed w/ RW and minAx2  Pt required max VCs to keep stay within RW HORTENCIA  Pt w/ SOB following amb, SpO2 % on 3L NC  Pt demonstrated adequate b/l LE strength during exercises, but required VC and TC to initiate all exercises  Pt demonstrated fatigue due to generalized weakness and decreased endurance  The patient's AM-PAC Basic Mobility Inpatient Short Form Raw Score is 12  A Raw score of less than or equal to 16 suggests the patient may benefit from discharge to post-acute rehabilitation services  Please also refer to the recommendation of the Physical Therapist for safe discharge planning  Recommend return to previous STR at d/c in order to maximize functional outcomes and mobility  Pt educated on importance of mobility and HEP; pt demonstrated understanding with HEP  At end of session, pt medically stable w/ lines intact, bed alarm activated, and all needs within reach  Nsg notified  Barriers to Discharge None   Barriers to Discharge Comments   (Per CM, return to previous STR w/ rehab services)   Goals   Patient Goals To rest   STG Expiration Date 05/24/22   PT Treatment Day 1   Plan   Treatment/Interventions Functional transfer training;LE strengthening/ROM; Elevations; Therapeutic exercise; Endurance training;Patient/family training;Bed mobility;Gait training;Spoke to nursing   PT Frequency 3-5x/wk   Recommendation   PT Discharge Recommendation Post acute rehabilitation services  (Per CM, return to previous STR w/ rehab services)   AM-PAC Basic Mobility Inpatient   Turning in Bed Without Bedrails 2   Lying on Back to Sitting on Edge of Flat Bed 2   Moving Bed to Chair 2   Standing Up From Chair 2   Walk in Room 2   Climb 3-5 Stairs 2   Basic Mobility Inpatient Raw Score 12   Basic Mobility Standardized Score 32 23   Highest Level Of Mobility   JH-HL Goal 4: Move to chair/commode   JH-HLM Highest Level of Mobility 6: Walk 10 steps or more   JH-HLM Goal Achieved Yes   Exercises   Heelslides Supine;10 reps;AROM; Bilateral   Hip Flexion Supine;10 reps;AROM; Bilateral  (SLR)   Hip Abduction Supine;10 reps;AROM; Bilateral   Hip Adduction Supine;10 reps;AROM; Bilateral   Knee AROM Short Arc Quad Supine;20 reps;AROM; Bilateral   Ankle Pumps Supine;20 reps;AROM; Bilateral   End of Consult   Patient Position at End of Consult Supine;Bed/Chair alarm activated; All needs within reach   End of Consult Comments At end of session pt stable w/ lines intact, bed alarm activated, and all needs within reach   [facility] ACCESS [facility]

## 2022-05-12 NOTE — PROGRESS NOTES
Pastoral Care Progress Note    2022  Patient: Chalo Torres : 1955  Admission Date & Time: 2022 0542  MRN: [id number] CSN: [id number]                      22 1200   Clinical Encounter Type   Visited With Patient   Routine Visit Introduction   Continue Visiting No   Referral From Nurse   Referral To    Patient stated she was "ok" and didn't have any need for support  She appeared sleepy  If anything changes feel free to reach out to pastoral care

## 2022-05-13 ENCOUNTER — APPOINTMENT (INPATIENT)
Dept: RADIOLOGY | Facility: HOSPITAL | Age: 67
DRG: 871 | End: 2022-05-13
Payer: MEDICARE

## 2022-05-13 VITALS
HEART RATE: 103 BPM | HEIGHT: 60 IN | WEIGHT: 189.38 LBS | RESPIRATION RATE: 18 BRPM | DIASTOLIC BLOOD PRESSURE: 56 MMHG | BODY MASS INDEX: 37.18 KG/M2 | SYSTOLIC BLOOD PRESSURE: 115 MMHG | OXYGEN SATURATION: 98 % | TEMPERATURE: 97.6 F

## 2022-05-13 PROBLEM — E87.6 HYPOKALEMIA: Status: RESOLVED | Noted: 2022-05-10 | Resolved: 2022-05-13

## 2022-05-13 PROBLEM — I47.1 SVT (SUPRAVENTRICULAR TACHYCARDIA) (HCC): Status: RESOLVED | Noted: 2022-05-09 | Resolved: 2022-05-13

## 2022-05-13 PROBLEM — G93.40 ACUTE ENCEPHALOPATHY: Status: RESOLVED | Noted: 2022-05-10 | Resolved: 2022-05-13

## 2022-05-13 PROBLEM — A41.9 SEPSIS (HCC): Status: RESOLVED | Noted: 2022-05-09 | Resolved: 2022-05-13

## 2022-05-13 PROBLEM — N17.9 AKI (ACUTE KIDNEY INJURY) (HCC): Status: RESOLVED | Noted: 2022-05-09 | Resolved: 2022-05-13

## 2022-05-13 LAB
ABO GROUP BLD: NORMAL
ATRIAL RATE: 140 BPM
ATRIAL RATE: 163 BPM
BLD GP AB SCN SERPL QL: NEGATIVE
ERYTHROCYTE [DISTWIDTH] IN BLOOD BY AUTOMATED COUNT: 20.3 % (ref 11.6–15.1)
ERYTHROCYTE [DISTWIDTH] IN BLOOD BY AUTOMATED COUNT: 20.8 % (ref 11.6–15.1)
FLUAV RNA RESP QL NAA+PROBE: NEGATIVE
FLUBV RNA RESP QL NAA+PROBE: NEGATIVE
GLUCOSE SERPL-MCNC: 126 MG/DL (ref 65–140)
GLUCOSE SERPL-MCNC: 70 MG/DL (ref 65–140)
GLUCOSE SERPL-MCNC: 79 MG/DL (ref 65–140)
GLUCOSE SERPL-MCNC: 84 MG/DL (ref 65–140)
HCT VFR BLD AUTO: 23 % (ref 34.8–46.1)
HCT VFR BLD AUTO: 24.6 % (ref 34.8–46.1)
HGB BLD-MCNC: 7.2 G/DL (ref 11.5–15.4)
HGB BLD-MCNC: 7.6 G/DL (ref 11.5–15.4)
MCH RBC QN AUTO: 31.3 PG (ref 26.8–34.3)
MCH RBC QN AUTO: 32.3 PG (ref 26.8–34.3)
MCHC RBC AUTO-ENTMCNC: 30.9 G/DL (ref 31.4–37.4)
MCHC RBC AUTO-ENTMCNC: 31.3 G/DL (ref 31.4–37.4)
MCV RBC AUTO: 101 FL (ref 82–98)
MCV RBC AUTO: 103 FL (ref 82–98)
NRBC BLD AUTO-RTO: 0 /100 WBCS
NT-PROBNP SERPL-MCNC: 4113 PG/ML
P AXIS: 74 DEGREES
PLATELET # BLD AUTO: 8 THOUSANDS/UL (ref 149–390)
PLATELET # BLD AUTO: 80 THOUSANDS/UL (ref 149–390)
PMV BLD AUTO: 11.2 FL (ref 8.9–12.7)
PR INTERVAL: 134 MS
QRS AXIS: 54 DEGREES
QRS AXIS: 57 DEGREES
QRSD INTERVAL: 82 MS
QRSD INTERVAL: 82 MS
QT INTERVAL: 282 MS
QT INTERVAL: 284 MS
QTC INTERVAL: 431 MS
QTC INTERVAL: 433 MS
RBC # BLD AUTO: 2.23 MILLION/UL (ref 3.81–5.12)
RBC # BLD AUTO: 2.43 MILLION/UL (ref 3.81–5.12)
RH BLD: POSITIVE
RSV RNA RESP QL NAA+PROBE: NEGATIVE
SARS-COV-2 RNA RESP QL NAA+PROBE: NEGATIVE
SPECIMEN EXPIRATION DATE: NORMAL
T WAVE AXIS: 18 DEGREES
T WAVE AXIS: 43 DEGREES
VENTRICULAR RATE: 140 BPM
VENTRICULAR RATE: 141 BPM
WBC # BLD AUTO: 5.95 THOUSAND/UL (ref 4.31–10.16)
WBC # BLD AUTO: 7.46 THOUSAND/UL (ref 4.31–10.16)

## 2022-05-13 PROCEDURE — 94760 N-INVAS EAR/PLS OXIMETRY 1: CPT

## 2022-05-13 PROCEDURE — 86901 BLOOD TYPING SEROLOGIC RH(D): CPT | Performed by: INTERNAL MEDICINE

## 2022-05-13 PROCEDURE — 85025 COMPLETE CBC W/AUTO DIFF WBC: CPT | Performed by: INTERNAL MEDICINE

## 2022-05-13 PROCEDURE — 82948 REAGENT STRIP/BLOOD GLUCOSE: CPT

## 2022-05-13 PROCEDURE — 86900 BLOOD TYPING SEROLOGIC ABO: CPT | Performed by: INTERNAL MEDICINE

## 2022-05-13 PROCEDURE — 99239 HOSP IP/OBS DSCHRG MGMT >30: CPT | Performed by: INTERNAL MEDICINE

## 2022-05-13 PROCEDURE — 93010 ELECTROCARDIOGRAM REPORT: CPT | Performed by: INTERNAL MEDICINE

## 2022-05-13 PROCEDURE — 85027 COMPLETE CBC AUTOMATED: CPT | Performed by: INTERNAL MEDICINE

## 2022-05-13 PROCEDURE — 0241U HB NFCT DS VIR RESP RNA 4 TRGT: CPT | Performed by: INTERNAL MEDICINE

## 2022-05-13 PROCEDURE — 83880 ASSAY OF NATRIURETIC PEPTIDE: CPT | Performed by: INTERNAL MEDICINE

## 2022-05-13 PROCEDURE — 71045 X-RAY EXAM CHEST 1 VIEW: CPT

## 2022-05-13 PROCEDURE — 94640 AIRWAY INHALATION TREATMENT: CPT

## 2022-05-13 PROCEDURE — P9037 PLATE PHERES LEUKOREDU IRRAD: HCPCS

## 2022-05-13 PROCEDURE — 86850 RBC ANTIBODY SCREEN: CPT | Performed by: INTERNAL MEDICINE

## 2022-05-13 PROCEDURE — 30233R1 TRANSFUSION OF NONAUTOLOGOUS PLATELETS INTO PERIPHERAL VEIN, PERCUTANEOUS APPROACH: ICD-10-PCS | Performed by: INTERNAL MEDICINE

## 2022-05-13 PROCEDURE — 99232 SBSQ HOSP IP/OBS MODERATE 35: CPT | Performed by: INTERNAL MEDICINE

## 2022-05-13 RX ORDER — INSULIN LISPRO 100 [IU]/ML
1-5 INJECTION, SOLUTION INTRAVENOUS; SUBCUTANEOUS
Qty: 3 ML | Refills: 0
Start: 2022-05-14

## 2022-05-13 RX ORDER — BENZONATATE 100 MG/1
100 CAPSULE ORAL 3 TIMES DAILY
Status: DISCONTINUED | OUTPATIENT
Start: 2022-05-13 | End: 2022-05-14 | Stop reason: HOSPADM

## 2022-05-13 RX ORDER — METOPROLOL SUCCINATE 50 MG/1
50 TABLET, EXTENDED RELEASE ORAL 2 TIMES DAILY
Qty: 10 TABLET | Refills: 0
Start: 2022-05-13

## 2022-05-13 RX ORDER — GUAIFENESIN 600 MG
600 TABLET, EXTENDED RELEASE 12 HR ORAL EVERY 12 HOURS SCHEDULED
Qty: 10 TABLET | Refills: 0
Start: 2022-05-13

## 2022-05-13 RX ORDER — MORPHINE SULFATE 15 MG/1
15 TABLET, FILM COATED, EXTENDED RELEASE ORAL 2 TIMES DAILY
Qty: 6 TABLET | Refills: 0 | Status: SHIPPED | OUTPATIENT
Start: 2022-05-13 | End: 2022-05-23

## 2022-05-13 RX ORDER — BENZONATATE 100 MG/1
100 CAPSULE ORAL 3 TIMES DAILY
Qty: 20 CAPSULE | Refills: 0
Start: 2022-05-13

## 2022-05-13 RX ORDER — GUAIFENESIN 600 MG
600 TABLET, EXTENDED RELEASE 12 HR ORAL EVERY 12 HOURS SCHEDULED
Status: DISCONTINUED | OUTPATIENT
Start: 2022-05-13 | End: 2022-05-14 | Stop reason: HOSPADM

## 2022-05-13 RX ORDER — MORPHINE SULFATE 100 MG/5ML
5 SOLUTION, CONCENTRATE ORAL EVERY 6 HOURS PRN
Qty: 15 ML | Refills: 0 | Status: SHIPPED | OUTPATIENT
Start: 2022-05-13 | End: 2022-05-16

## 2022-05-13 RX ORDER — AMOXICILLIN AND CLAVULANATE POTASSIUM 875; 125 MG/1; MG/1
1 TABLET, FILM COATED ORAL EVERY 12 HOURS SCHEDULED
Qty: 10 TABLET | Refills: 0
Start: 2022-05-13 | End: 2022-05-18

## 2022-05-13 RX ADMIN — BENZONATATE 100 MG: 100 CAPSULE ORAL at 21:05

## 2022-05-13 RX ADMIN — GUAIFENESIN 600 MG: 600 TABLET, EXTENDED RELEASE ORAL at 21:01

## 2022-05-13 RX ADMIN — ACYCLOVIR 400 MG: 200 CAPSULE ORAL at 21:01

## 2022-05-13 RX ADMIN — FLUTICASONE PROPIONATE 2 SPRAY: 50 SPRAY, METERED NASAL at 10:26

## 2022-05-13 RX ADMIN — BENZONATATE 100 MG: 100 CAPSULE ORAL at 16:45

## 2022-05-13 RX ADMIN — OXYCODONE HYDROCHLORIDE AND ACETAMINOPHEN 500 MG: 500 TABLET ORAL at 10:21

## 2022-05-13 RX ADMIN — GABAPENTIN 100 MG: 100 CAPSULE ORAL at 18:14

## 2022-05-13 RX ADMIN — PANTOPRAZOLE SODIUM 20 MG: 20 TABLET, DELAYED RELEASE ORAL at 06:26

## 2022-05-13 RX ADMIN — GUAIFENESIN 600 MG: 600 TABLET, EXTENDED RELEASE ORAL at 10:21

## 2022-05-13 RX ADMIN — MORPHINE SULFATE 15 MG: 15 TABLET, EXTENDED RELEASE ORAL at 10:32

## 2022-05-13 RX ADMIN — B-COMPLEX W/ C & FOLIC ACID TAB 1 TABLET: TAB at 10:21

## 2022-05-13 RX ADMIN — AMOXICILLIN AND CLAVULANATE POTASSIUM 1 TABLET: 875; 125 TABLET, FILM COATED ORAL at 21:00

## 2022-05-13 RX ADMIN — ACYCLOVIR 400 MG: 200 CAPSULE ORAL at 06:25

## 2022-05-13 RX ADMIN — AMOXICILLIN AND CLAVULANATE POTASSIUM 1 TABLET: 875; 125 TABLET, FILM COATED ORAL at 10:32

## 2022-05-13 RX ADMIN — ACYCLOVIR 400 MG: 200 CAPSULE ORAL at 18:14

## 2022-05-13 RX ADMIN — METOPROLOL TARTRATE 75 MG: 50 TABLET, FILM COATED ORAL at 10:21

## 2022-05-13 RX ADMIN — GABAPENTIN 100 MG: 100 CAPSULE ORAL at 10:21

## 2022-05-13 RX ADMIN — METOPROLOL TARTRATE 75 MG: 50 TABLET, FILM COATED ORAL at 21:01

## 2022-05-13 RX ADMIN — IPRATROPIUM BROMIDE 0.25 MG: 0.5 SOLUTION RESPIRATORY (INHALATION) at 07:20

## 2022-05-13 RX ADMIN — MORPHINE SULFATE 15 MG: 15 TABLET, EXTENDED RELEASE ORAL at 21:00

## 2022-05-13 RX ADMIN — FLUTICASONE FUROATE AND VILANTEROL TRIFENATATE 1 PUFF: 100; 25 POWDER RESPIRATORY (INHALATION) at 10:18

## 2022-05-13 RX ADMIN — ACYCLOVIR 400 MG: 200 CAPSULE ORAL at 14:14

## 2022-05-13 RX ADMIN — POTASSIUM CHLORIDE 40 MEQ: 20 TABLET, EXTENDED RELEASE ORAL at 10:22

## 2022-05-13 RX ADMIN — SENNOSIDES 8.6 MG: 8.6 TABLET ORAL at 21:02

## 2022-05-13 RX ADMIN — IPRATROPIUM BROMIDE 0.25 MG: 0.5 SOLUTION RESPIRATORY (INHALATION) at 19:13

## 2022-05-13 RX ADMIN — ALLOPURINOL 200 MG: 100 TABLET ORAL at 10:22

## 2022-05-13 RX ADMIN — BENZONATATE 100 MG: 100 CAPSULE ORAL at 10:22

## 2022-05-13 RX ADMIN — ACYCLOVIR 400 MG: 200 CAPSULE ORAL at 10:21

## 2022-05-13 NOTE — ASSESSMENT & PLAN NOTE
Lab Results   Component Value Date    HGBA1C 6 2 (H) 05/09/2022       Recent Labs     05/12/22  2310 05/13/22  0712 05/13/22  1202 05/13/22  1627   POCGLU 109 70 126 79       Blood Sugar Average: Last 72 hrs:  (P) 793 [id number]   · Patient has history of diabetes type 2, without long-term use of insulin, without complication  · Her oral metformin was held during her hospitalization  · She was monitored with Sliding scale insulin coverage with Accu-Cheks  · Due to borderline blood sugars will not recommend restarting metformin at discharge    Continue Accu-Cheks and sliding scale as needed at her rehab center

## 2022-05-13 NOTE — PROGRESS NOTES
Progress Note - Palliative & Supportive Care  Manpreet Payton  78 y o   female  [address] [address] /[address]   MRN: [id number]  Encounter: [id number]     ASSESSMENT:    Patient Active Problem List   Diagnosis    Multifocal pneumonia    [facility] )    Hypertension    GERD (gastroesophageal reflux disease)    Thrombocytopenia (HCC)    Asthma    Pressure injury of left buttock, stage 2 ([facility] )    Ambulatory dysfunction    [facility]K [facility]) ([facility] )    SVT (supraventricular tachycardia) (HCC)    Chronic respiratory failure (HCC)    Type 2 diabetes mellitus ([facility] )    Sepsis ([facility] )    Continuous opioid dependence ([place] [address] )    Acute encephalopathy    Hypokalemia       Active problems addressed:  Multiple myeloma, not having achieved remission  Cancer related pain  Encephalopathy  Sepsis  UTI  Goals of care    PLAN:    1  Symptom management:   Mar reviewed: only used 1 prn dose of MSIR 5mg overnight  Been taking/receiveing MSER BID as directed  · Continue MS ER 15mg BID and MSIR 5mg SL q6H prn   · Of note, she uses MSIR 10mg SL at home AS NEEDED, and she only uses it sparingly   She should follow up with her [address] [facility] or oncology group for further management of CRP  · Patient should not need script for MSER and MSIR, just filled script on 5/4    Palliative will sign off now  Pls re-engage if further assistance is needed  Please reach out to on-call palliative care via tiger text if further assistance re:  Pain meds is needed over the weekend    2  Goals:   · She is treatment-focused, wants to make it back to her chemotherapy   Follow up with [facility] oncology as an OP    Code status: Level 1 - Full Code   Decisional apparatus:  Patient does have capacity to make medical decisions on my exam today  If such capacity is lost, patient's substitute decision maker would default to  and daughter by [place]     Advance Directive / Living Will / POLST:  None on file    We appreciate the opportunity to participate in this patient's care  We will continue to follow  Please do not hesitate to contact our on-call provider through our clinic answering service at [phone] should you have acute symptom control concerns  INTERVAL HISTORY:  Chart reviewed  No acute events overnight  MAR reviewed, only used 1 prn dose of MSIR overnight  She is using MS ER twice a day as directed  She was AAO x3 today  She was slow to respond, but seems to be responding appropriately  She said her pain is well controlled with current regimen  She denies any other complaints today  She reports feeling better and looks forward to going home  Review of Systems   Constitutional: Positive for activity change and fatigue  Negative for appetite change  HENT: Negative for trouble swallowing  Respiratory: Negative for shortness of breath  Cardiovascular: Negative for chest pain  Gastrointestinal: Negative for abdominal pain, constipation, diarrhea and nausea  Musculoskeletal: Positive for arthralgias and myalgias  Neurological: Positive for weakness  Psychiatric/Behavioral: Negative for sleep disturbance  The patient is not nervous/anxious  All other systems reviewed and are negative  MEDICATIONS / ALLERGIES:  all current active meds have been reviewed    Allergies   Allergen Reactions    Aspirin     Lisinopril Angioedema    Nsaids      Avoid due to renal protection    Penicillins        OBJECTIVE:  /59   Pulse 100   Temp (!) 97 2 °F (36 2 °C) (Temporal)   Resp 16   Ht 5' (1 524 m)   Wt 85 9 kg (189 lb 6 oz)   SpO2 96%   BMI 36 98 kg/m²   Physical Exam:    Physical Exam  Vitals and nursing note reviewed  Constitutional:       General: She is not in acute distress  Appearance: Normal appearance  She is ill-appearing  She is not toxic-appearing or diaphoretic  HENT:      Head: Normocephalic  Eyes:      General: No scleral icterus  Right eye: No discharge  Left eye: No discharge  Pulmonary:      Effort: Pulmonary effort is normal  No respiratory distress  Abdominal:      General: Abdomen is flat  Musculoskeletal:         General: No swelling  Skin:     Coloration: Skin is not jaundiced or pale  Neurological:      Mental Status: She is alert and oriented to person, place, and time  Comments: Slow to respond, but responding appropriately   Psychiatric:         Mood and Affect: Mood normal          Behavior: Behavior normal          Thought Content: Thought content normal          Judgment: Judgment normal        Lab Results:   I have personally reviewed pertinent labs  Imaging Studies: I have personally reviewed pertinent reports  EKG, Pathology, and Other Studies: I have personally reviewed pertinent reports  Counseling / Coordination of Care  Total floor / unit time spent today 10 minutes  Greater than 50% of total time was spent with the patient and / or family counseling and / or coordination of care  A description of the counseling / coordination of care: provided medical updates, discussed palliative care, determined competency, determined goals of care, determined POA, determined social/family support, discussed plans of care, discussed symptom management, provided psychosocial support      David Álvarez MD  [address] and Supportive Care  [phone]

## 2022-05-13 NOTE — CASE MANAGEMENT
Case Management Discharge Planning Note    Patient name Mele Mccabe  Location [place] [address] [address] [address] MRN [id number]  : 1955 Date 2022       Current Admission Date: 2022  Current Admission Diagnosis:Sepsis [facility])   Patient Active Problem List    Diagnosis Date Noted    Continuous opioid dependence ([facility] ) 05/10/2022    Acute encephalopathy 05/10/2022    Hypokalemia 05/10/2022    MIK (acute kidney injury) ([facility] ) 2022    SVT (supraventricular tachycardia) ([facility] ) 2022    Chronic respiratory failure ([facility] ) 2022    Type 2 diabetes mellitus ([facility] ) 2022    Sepsis ([facility] ) 2022    Pressure injury of right buttock, unstageable ([facility] ) 2022    Pressure injury of left buttock, stage 2 ([facility] ) 2022    Ambulatory dysfunction 2022    Multifocal pneumonia 2019    Multiple myeloma ([facility] ) 2019    Hypertension 2019    GERD (gastroesophageal reflux disease) 2019    Thrombocytopenia ([facility] ) 2019    Asthma 2019      LOS (days): 4  Geometric Mean LOS (GMLOS) (days): 4 80  Days to GMLOS:0 5     OBJECTIVE:  Risk of Unplanned Readmission Score: 30 83         Current admission status: Inpatient   Preferred Pharmacy:   [facility], [address] [address]  Phone: [phone] Fax: [phone]    Primary Care Provider: No primary care provider on file  Primary Insurance: MEDICARE  Secondary Insurance:     DISCHARGE DETAILS:    IMM Given (Date):: 22  IMM Given to[de-identified] Patient  Family notified[de-identified] Read IMM, copy given and copy put in bin to be scan

## 2022-05-13 NOTE — CASE MANAGEMENT
Case Management Discharge Planning Note    Patient name Jessica Lemus  Location [place] [address] [address] [address] MRN [id number]  : 1955 Date 2022       Current Admission Date: 2022  Current Admission Diagnosis:Sepsis [facility])   Patient Active Problem List    Diagnosis Date Noted    Continuous opioid dependence ([facility] ) 05/10/2022    Acute encephalopathy 05/10/2022    Hypokalemia 05/10/2022    MIK (acute kidney injury) ([facility] ) 2022    SVT (supraventricular tachycardia) ([facility] ) 2022    Chronic respiratory failure ([facility] ) 2022    Type 2 diabetes mellitus ([facility] ) 2022    Sepsis ([facility] ) 2022    Pressure injury of right buttock, unstageable ([facility] ) 2022    Pressure injury of left buttock, stage 2 ([facility] ) 2022    Ambulatory dysfunction 2022    Multifocal pneumonia 2019    Multiple myeloma ([facility] ) 2019    Hypertension 2019    GERD (gastroesophageal reflux disease) 2019    Thrombocytopenia ([facility] ) 2019    Asthma 2019      LOS (days): 4  Geometric Mean LOS (GMLOS) (days): 4 80  Days to GMLOS:0 6     OBJECTIVE:  Risk of Unplanned Readmission Score: 30 83         Current admission status: Inpatient   Preferred Pharmacy:   [facility], [address] [address]  Phone: [phone] Fax: [phone]    Primary Care Provider: No primary care provider on file  Primary Insurance: MEDICARE  Secondary Insurance:     DISCHARGE DETAILS:          Comments - Freedom of Choice: MD is planning on discharging pt back to [facility] and they can accept back  Pt is not a MA bedhold  COVID was neg  Completed Med Nec form and put on chart  Made a referral for S                                                 Transport at Discharge : [facility]  Dispatcher Contacted: Yes        ETA of Transport (Date): 22                               Accepting Facility Name, [facility] : [address] [facility] [address]  Receiving Facility/Agency Phone Number: [phone]  Facility/Agency Fax Number: [phone]

## 2022-05-13 NOTE — ASSESSMENT & PLAN NOTE
· Potassium 2 5 at time of admission with magnesium 0 8  · Both have been repleted throughout hospitalization

## 2022-05-13 NOTE — ASSESSMENT & PLAN NOTE
· Patient's history of thrombocytopenia to multiple myeloma  · Patient was transfused 1 group of platelets during her hospitalization for platelet count of 8 with a follow-up platelet count of 80  · Continue outpatient follow-up with her primary oncology team at [facility]

## 2022-05-13 NOTE — PLAN OF CARE
Problem: MOBILITY - ADULT  Goal: Maintain or return to baseline ADL function  Description: INTERVENTIONS:  -  Assess patient's ability to carry out ADLs; assess patient's baseline for ADL function and identify physical deficits which impact ability to perform ADLs (bathing, care of mouth/teeth, toileting, grooming, dressing, etc )  - Assess/evaluate cause of self-care deficits   - Assess range of motion  - Assess patient's mobility; develop plan if impaired  - Assess patient's need for assistive devices and provide as appropriate  - Encourage maximum independence but intervene and supervise when necessary  - Involve family in performance of ADLs  - Assess for home care needs following discharge   - Consider OT consult to assist with ADL evaluation and planning for discharge  - Provide patient education as appropriate  Outcome: Progressing  Goal: Maintains/Returns to pre admission functional level  Description: INTERVENTIONS:  - Perform BMAT or MOVE assessment daily    - Set and communicate daily mobility goal to care team and patient/family/caregiver  - Collaborate with rehabilitation services on mobility goals if consulted  - Perform Range of Motion  times a day  - Reposition patient every  hours    - Dangle patient  times a day  - Stand patient  times a day  - Ambulate patient  times a day  - Out of bed to chair  times a day   - Out of bed for meals  times a day  - Out of bed for toileting  - Record patient progress and toleration of activity level   Outcome: Progressing     Problem: Prexisting or High Potential for Compromised Skin Integrity  Goal: Skin integrity is maintained or improved  Description: INTERVENTIONS:  - Identify patients at risk for skin breakdown  - Assess and monitor skin integrity  - Assess and monitor nutrition and hydration status  - Monitor labs   - Assess for incontinence   - Turn and reposition patient  - Assist with mobility/ambulation  - Relieve pressure over bony prominences  - Avoid friction and shearing  - Provide appropriate hygiene as needed including keeping skin clean and dry  - Evaluate need for skin moisturizer/barrier cream  - Collaborate with interdisciplinary team   - Patient/family teaching  - Consider wound care consult   Outcome: Progressing     Problem: Potential for Falls  Goal: Patient will remain free of falls  Description: INTERVENTIONS:  - Educate patient/family on patient safety including physical limitations  - Instruct patient to call for assistance with activity   - Consult OT/PT to assist with strengthening/mobility   - Keep Call bell within reach  - Keep bed low and locked with side rails adjusted as appropriate  - Keep care items and personal belongings within reach  - Initiate and maintain comfort rounds  - Make Fall Risk Sign visible to staff  - Offer Toileting every  Hours, in advance of need  - Initiate/Maintain alarm  - Obtain necessary fall risk management equipment:   - Apply yellow socks and bracelet for high fall risk patients  - Consider moving patient to room near nurses station  Outcome: Progressing     Problem: PAIN - ADULT  Goal: Verbalizes/displays adequate comfort level or baseline comfort level  Description: Interventions:  - Encourage patient to monitor pain and request assistance  - Assess pain using appropriate pain scale  - Administer analgesics based on type and severity of pain and evaluate response  - Implement non-pharmacological measures as appropriate and evaluate response  - Consider cultural and social influences on pain and pain management  - Notify physician/advanced practitioner if interventions unsuccessful or patient reports new pain  Outcome: Progressing     Problem: INFECTION - ADULT  Goal: Absence or prevention of progression during hospitalization  Description: INTERVENTIONS:  - Assess and monitor for signs and symptoms of infection  - Monitor lab/diagnostic results  - Monitor all insertion sites, i e  indwelling lines, tubes, and drains  - Monitor endotracheal if appropriate and nasal secretions for changes in amount and color  - Union appropriate cooling/warming therapies per order  - Administer medications as ordered  - Instruct and encourage patient and family to use good hand hygiene technique  - Identify and instruct in appropriate isolation precautions for identified infection/condition  Outcome: Progressing  Goal: Absence of fever/infection during neutropenic period  Description: INTERVENTIONS:  - Monitor WBC    Outcome: Progressing     Problem: SAFETY ADULT  Goal: Maintain or return to baseline ADL function  Description: INTERVENTIONS:  -  Assess patient's ability to carry out ADLs; assess patient's baseline for ADL function and identify physical deficits which impact ability to perform ADLs (bathing, care of mouth/teeth, toileting, grooming, dressing, etc )  - Assess/evaluate cause of self-care deficits   - Assess range of motion  - Assess patient's mobility; develop plan if impaired  - Assess patient's need for assistive devices and provide as appropriate  - Encourage maximum independence but intervene and supervise when necessary  - Involve family in performance of ADLs  - Assess for home care needs following discharge   - Consider OT consult to assist with ADL evaluation and planning for discharge  - Provide patient education as appropriate  Outcome: Progressing  Goal: Maintains/Returns to pre admission functional level  Description: INTERVENTIONS:  - Perform BMAT or MOVE assessment daily    - Set and communicate daily mobility goal to care team and patient/family/caregiver  - Collaborate with rehabilitation services on mobility goals if consulted  - Perform Range of Motion  times a day  - Reposition patient every  hours    - Dangle patient  times a day  - Stand patient  times a day  - Ambulate patient  times a day  - Out of bed to chair  times a day   - Out of bed for meals  times a day  - Out of bed for toileting  - Record patient progress and toleration of activity level   Outcome: Progressing  Goal: Patient will remain free of falls  Description: INTERVENTIONS:  - Educate patient/family on patient safety including physical limitations  - Instruct patient to call for assistance with activity   - Consult OT/PT to assist with strengthening/mobility   - Keep Call bell within reach  - Keep bed low and locked with side rails adjusted as appropriate  - Keep care items and personal belongings within reach  - Initiate and maintain comfort rounds  - Make Fall Risk Sign visible to staff  - Offer Toileting every  Hours, in advance of need  - Initiate/Maintain alarm  - Obtain necessary fall risk management equipment:   - Apply yellow socks and bracelet for high fall risk patients  - Consider moving patient to room near nurses station  Outcome: Progressing     Problem: DISCHARGE PLANNING  Goal: Discharge to home or other facility with appropriate resources  Description: INTERVENTIONS:  - Identify barriers to discharge w/patient and caregiver  - Arrange for needed discharge resources and transportation as appropriate  - Identify discharge learning needs (meds, wound care, etc )  - Arrange for interpretive services to assist at discharge as needed  - Refer to Case Management Department for coordinating discharge planning if the patient needs post-hospital services based on physician/advanced practitioner order or complex needs related to functional status, cognitive ability, or social support system  Outcome: Progressing     Problem: Knowledge Deficit  Goal: Patient/family/caregiver demonstrates understanding of disease process, treatment plan, medications, and discharge instructions  Description: Complete learning assessment and assess knowledge base    Interventions:  - Provide teaching at level of understanding  - Provide teaching via preferred learning methods  Outcome: Progressing     Problem: Nutrition/Hydration-ADULT  Goal: Nutrient/Hydration intake appropriate for improving, restoring or maintaining nutritional needs  Description: Monitor and assess patient's nutrition/hydration status for malnutrition  Collaborate with interdisciplinary team and initiate plan and interventions as ordered  Monitor patient's weight and dietary intake as ordered or per policy  Utilize nutrition screening tool and intervene as necessary  Determine patient's food preferences and provide high-protein, high-caloric foods as appropriate       INTERVENTIONS:  - Monitor oral intake, urinary output, labs, and treatment plans  - Assess nutrition and hydration status and recommend course of action  - Evaluate amount of meals eaten  - Assist patient with eating if necessary   - Allow adequate time for meals  - Recommend/ encourage appropriate diets, oral nutritional supplements, and vitamin/mineral supplements  - Order, calculate, and assess calorie counts as needed  - Recommend, monitor, and adjust tube feedings and TPN/PPN based on assessed needs  - Assess need for intravenous fluids  - Provide specific nutrition/hydration education as appropriate  - Include patient/family/caregiver in decisions related to nutrition  Outcome: Progressing     Problem: NEUROSENSORY - ADULT  Goal: Achieves stable or improved neurological status  Description: INTERVENTIONS  - Monitor and report changes in neurological status  - Monitor vital signs such as temperature, blood pressure, glucose, and any other labs ordered   - Initiate measures to prevent increased intracranial pressure  - Monitor for seizure activity and implement precautions if appropriate      Outcome: Progressing     Problem: CARDIOVASCULAR - ADULT  Goal: Maintains optimal cardiac output and hemodynamic stability  Description: INTERVENTIONS:  - Monitor I/O, vital signs and rhythm  - Monitor for S/S and trends of decreased cardiac output  - Administer and titrate ordered vasoactive medications to optimize hemodynamic stability  - Assess quality of pulses, skin color and temperature  - Assess for signs of decreased coronary artery perfusion  - Instruct patient to report change in severity of symptoms  Outcome: Progressing  Goal: Absence of cardiac dysrhythmias or at baseline rhythm  Description: INTERVENTIONS:  - Continuous cardiac monitoring, vital signs, obtain 12 lead EKG if ordered  - Administer antiarrhythmic and heart rate control medications as ordered  - Monitor electrolytes and administer replacement therapy as ordered  Outcome: Progressing     Problem: RESPIRATORY - ADULT  Goal: Achieves optimal ventilation and oxygenation  Description: INTERVENTIONS:  - Assess for changes in respiratory status  - Assess for changes in mentation and behavior  - Position to facilitate oxygenation and minimize respiratory effort  - Oxygen administered by appropriate delivery if ordered  - Initiate smoking cessation education as indicated  - Encourage broncho-pulmonary hygiene including cough, deep breathe, Incentive Spirometry  - Assess the need for suctioning and aspirate as needed  - Assess and instruct to report SOB or any respiratory difficulty  - Respiratory Therapy support as indicated  Outcome: Progressing     Problem: GENITOURINARY - ADULT  Goal: Urinary catheter remains patent  Description: INTERVENTIONS:  - Assess patency of urinary catheter  - If patient has a chronic costello, consider changing catheter if non-functioning  - Follow guidelines for intermittent irrigation of non-functioning urinary catheter  Outcome: Progressing     Problem: METABOLIC, FLUID AND ELECTROLYTES - ADULT  Goal: Electrolytes maintained within normal limits  Description: INTERVENTIONS:  - Monitor labs and assess patient for signs and symptoms of electrolyte imbalances  - Administer electrolyte replacement as ordered  - Monitor response to electrolyte replacements, including repeat lab results as appropriate  - Instruct patient on fluid and nutrition as appropriate  Outcome: Progressing     Problem: SKIN/TISSUE INTEGRITY - ADULT  Goal: Skin Integrity remains intact(Skin Breakdown Prevention)  Description: Assess:  -Perform Boo assessment every   -Clean and moisturize skin every   -Inspect skin when repositioning, toileting, and assisting with ADLS  -Assess under medical devices such as  every   -Assess extremities for adequate circulation and sensation     Bed Management:  -Have minimal linens on bed & keep smooth, unwrinkled  -Change linens as needed when moist or perspiring  -Avoid sitting or lying in one position for more than  hours while in bed  -Keep HOB at degrees     Toileting:  -Offer bedside commode  -Assess for incontinence every   -Use incontinent care products after each incontinent episode such as     Activity:  -Mobilize patient  times a day  -Encourage activity and walks on unit  -Encourage or provide ROM exercises   -Turn and reposition patient every  Hours  -Use appropriate equipment to lift or move patient in bed  -Instruct/ Assist with weight shifting every  when out of bed in chair  -Consider limitation of chair time  hour intervals    Skin Care:  -Avoid use of baby powder, tape, friction and shearing, hot water or constrictive clothing  -Relieve pressure over bony prominences using   -Do not massage red bony areas    Next Steps:  -Teach patient strategies to minimize risks such as    -Consider consults to  interdisciplinary teams such as  Outcome: Progressing     Problem: HEMATOLOGIC - ADULT  Goal: Maintains hematologic stability  Description: INTERVENTIONS  - Assess for signs and symptoms of bleeding or hemorrhage  - Monitor labs  - Administer supportive blood products/factors as ordered and appropriate  Outcome: Progressing

## 2022-05-13 NOTE — PLAN OF CARE
Problem: MOBILITY - ADULT  Goal: Maintain or return to baseline ADL function  Description: INTERVENTIONS:  -  Assess patient's ability to carry out ADLs; assess patient's baseline for ADL function and identify physical deficits which impact ability to perform ADLs (bathing, care of mouth/teeth, toileting, grooming, dressing, etc )  - Assess/evaluate cause of self-care deficits   - Assess range of motion  - Assess patient's mobility; develop plan if impaired  - Assess patient's need for assistive devices and provide as appropriate  - Encourage maximum independence but intervene and supervise when necessary  - Involve family in performance of ADLs  - Assess for home care needs following discharge   - Consider OT consult to assist with ADL evaluation and planning for discharge  - Provide patient education as appropriate  Outcome: Progressing  Goal: Maintains/Returns to pre admission functional level  Description: INTERVENTIONS:  - Perform BMAT or MOVE assessment daily    - Set and communicate daily mobility goal to care team and patient/family/caregiver     - Collaborate with rehabilitation services on mobility goals if consulted        - Out of bed to chair 2 times a day     - Out of bed for toileting  - Record patient progress and toleration of activity level   Outcome: Progressing     Problem: Prexisting or High Potential for Compromised Skin Integrity  Goal: Skin integrity is maintained or improved  Description: INTERVENTIONS:  - Identify patients at risk for skin breakdown  - Assess and monitor skin integrity  - Assess and monitor nutrition and hydration status  - Monitor labs   - Assess for incontinence   - Turn and reposition patient  - Assist with mobility/ambulation  - Relieve pressure over bony prominences  - Avoid friction and shearing  - Provide appropriate hygiene as needed including keeping skin clean and dry  - Evaluate need for skin moisturizer/barrier cream  - Collaborate with interdisciplinary team - Patient/family teaching  - Consider wound care consult   Outcome: Progressing     Problem: Potential for Falls  Goal: Patient will remain free of falls  Description: INTERVENTIONS:  - Educate patient/family on patient safety including physical limitations  - Instruct patient to call for assistance with activity   - Consult OT/PT to assist with strengthening/mobility   - Keep Call bell within reach  - Keep bed low and locked with side rails adjusted as appropriate  - Keep care items and personal belongings within reach  - Initiate and maintain comfort rounds  - Make Fall Risk Sign visible to staff      - Apply yellow socks and bracelet for high fall risk patients  - Consider moving patient to room near nurses station  Outcome: Progressing     Problem: PAIN - ADULT  Goal: Verbalizes/displays adequate comfort level or baseline comfort level  Description: Interventions:  - Encourage patient to monitor pain and request assistance  - Assess pain using appropriate pain scale  - Administer analgesics based on type and severity of pain and evaluate response  - Implement non-pharmacological measures as appropriate and evaluate response  - Consider cultural and social influences on pain and pain management  - Notify physician/advanced practitioner if interventions unsuccessful or patient reports new pain  Outcome: Progressing     Problem: INFECTION - ADULT  Goal: Absence or prevention of progression during hospitalization  Description: INTERVENTIONS:  - Assess and monitor for signs and symptoms of infection  - Monitor lab/diagnostic results  - Monitor all insertion sites, i e  indwelling lines, tubes, and drains  - Monitor endotracheal if appropriate and nasal secretions for changes in amount and color  - Lynch appropriate cooling/warming therapies per order  - Administer medications as ordered  - Instruct and encourage patient and family to use good hand hygiene technique  - Identify and instruct in appropriate isolation precautions for identified infection/condition  Outcome: Progressing  Goal: Absence of fever/infection during neutropenic period  Description: INTERVENTIONS:  - Monitor WBC    Outcome: Progressing     Problem: SAFETY ADULT  Goal: Maintain or return to baseline ADL function  Description: INTERVENTIONS:  -  Assess patient's ability to carry out ADLs; assess patient's baseline for ADL function and identify physical deficits which impact ability to perform ADLs (bathing, care of mouth/teeth, toileting, grooming, dressing, etc )  - Assess/evaluate cause of self-care deficits   - Assess range of motion  - Assess patient's mobility; develop plan if impaired  - Assess patient's need for assistive devices and provide as appropriate  - Encourage maximum independence but intervene and supervise when necessary  - Involve family in performance of ADLs  - Assess for home care needs following discharge   - Consider OT consult to assist with ADL evaluation and planning for discharge  - Provide patient education as appropriate  Outcome: Progressing  Goal: Maintains/Returns to pre admission functional level  Description: INTERVENTIONS:  - Perform BMAT or MOVE assessment daily    - Set and communicate daily mobility goal to care team and patient/family/caregiver     - Collaborate with rehabilitation services on mobility goals if consulted            - Out of bed for toileting  - Record patient progress and toleration of activity level   Outcome: Progressing  Goal: Patient will remain free of falls  Description: INTERVENTIONS:  - Educate patient/family on patient safety including physical limitations  - Instruct patient to call for assistance with activity   - Consult OT/PT to assist with strengthening/mobility   - Keep Call bell within reach  - Keep bed low and locked with side rails adjusted as appropriate  - Keep care items and personal belongings within reach  - Initiate and maintain comfort rounds  - Make Fall Risk Sign visible to staff      - Apply yellow socks and bracelet for high fall risk patients  - Consider moving patient to room near nurses station  Outcome: Progressing     Problem: DISCHARGE PLANNING  Goal: Discharge to home or other facility with appropriate resources  Description: INTERVENTIONS:  - Identify barriers to discharge w/patient and caregiver  - Arrange for needed discharge resources and transportation as appropriate  - Identify discharge learning needs (meds, wound care, etc )  - Arrange for interpretive services to assist at discharge as needed  - Refer to Case Management Department for coordinating discharge planning if the patient needs post-hospital services based on physician/advanced practitioner order or complex needs related to functional status, cognitive ability, or social support system  Outcome: Progressing     Problem: Knowledge Deficit  Goal: Patient/family/caregiver demonstrates understanding of disease process, treatment plan, medications, and discharge instructions  Description: Complete learning assessment and assess knowledge base  Interventions:  - Provide teaching at level of understanding  - Provide teaching via preferred learning methods  Outcome: Progressing     Problem: Nutrition/Hydration-ADULT  Goal: Nutrient/Hydration intake appropriate for improving, restoring or maintaining nutritional needs  Description: Monitor and assess patient's nutrition/hydration status for malnutrition  Collaborate with interdisciplinary team and initiate plan and interventions as ordered  Monitor patient's weight and dietary intake as ordered or per policy  Utilize nutrition screening tool and intervene as necessary  Determine patient's food preferences and provide high-protein, high-caloric foods as appropriate       INTERVENTIONS:  - Monitor oral intake, urinary output, labs, and treatment plans  - Assess nutrition and hydration status and recommend course of action  - Evaluate amount of meals eaten  - Assist patient with eating if necessary   - Allow adequate time for meals  - Recommend/ encourage appropriate diets, oral nutritional supplements, and vitamin/mineral supplements  - Order, calculate, and assess calorie counts as needed  - Recommend, monitor, and adjust tube feedings and TPN/PPN based on assessed needs  - Assess need for intravenous fluids  - Provide specific nutrition/hydration education as appropriate  - Include patient/family/caregiver in decisions related to nutrition  Outcome: Progressing     Problem: NEUROSENSORY - ADULT  Goal: Achieves stable or improved neurological status  Description: INTERVENTIONS  - Monitor and report changes in neurological status  - Monitor vital signs such as temperature, blood pressure, glucose, and any other labs ordered   - Initiate measures to prevent increased intracranial pressure  - Monitor for seizure activity and implement precautions if appropriate      Outcome: Progressing     Problem: CARDIOVASCULAR - ADULT  Goal: Maintains optimal cardiac output and hemodynamic stability  Description: INTERVENTIONS:  - Monitor I/O, vital signs and rhythm  - Monitor for S/S and trends of decreased cardiac output  - Administer and titrate ordered vasoactive medications to optimize hemodynamic stability  - Assess quality of pulses, skin color and temperature  - Assess for signs of decreased coronary artery perfusion  - Instruct patient to report change in severity of symptoms  Outcome: Progressing  Goal: Absence of cardiac dysrhythmias or at baseline rhythm  Description: INTERVENTIONS:  - Continuous cardiac monitoring, vital signs, obtain 12 lead EKG if ordered  - Administer antiarrhythmic and heart rate control medications as ordered  - Monitor electrolytes and administer replacement therapy as ordered  Outcome: Progressing     Problem: RESPIRATORY - ADULT  Goal: Achieves optimal ventilation and oxygenation  Description: INTERVENTIONS:  - Assess for changes in respiratory status  - Assess for changes in mentation and behavior  - Position to facilitate oxygenation and minimize respiratory effort  - Oxygen administered by appropriate delivery if ordered  - Initiate smoking cessation education as indicated  - Encourage broncho-pulmonary hygiene including cough, deep breathe, Incentive Spirometry  - Assess the need for suctioning and aspirate as needed  - Assess and instruct to report SOB or any respiratory difficulty  - Respiratory Therapy support as indicated  Outcome: Progressing     Problem: GENITOURINARY - ADULT  Goal: Urinary catheter remains patent  Description: INTERVENTIONS:  - Assess patency of urinary catheter  - If patient has a chronic costello, consider changing catheter if non-functioning  - Follow guidelines for intermittent irrigation of non-functioning urinary catheter  Outcome: Progressing     Problem: SKIN/TISSUE INTEGRITY - ADULT  Goal: Skin Integrity remains intact(Skin Breakdown Prevention)  Description: Assess:  d assisting with ADLS    -Assess extremities for adequate circulation and sensation     Bed Management:  -Have minimal linens on bed & keep smooth, unwrinkled  -Change linens as needed when moist or perspiring      Toileting:        Activity:    -Encourage activity and walks on unit  -Encourage or provide ROM exercises     -Use appropriate equipment to lift or move patient in bed      Skin Care:  -Avoid use of baby powder, tape, friction and shearing, hot water or constrictive clothing    -Do not massage red bony areas    Next Steps:      Outcome: Progressing     Problem: HEMATOLOGIC - ADULT  Goal: Maintains hematologic stability  Description: INTERVENTIONS  - Assess for signs and symptoms of bleeding or hemorrhage  - Monitor labs  - Administer supportive blood products/factors as ordered and appropriate  Outcome: Progressing

## 2022-05-13 NOTE — DISCHARGE SUMMARY
[facility]  Discharge- Joseluis Fus 1955, 78 y o  female MRN: [id number]  Unit/Bed#: E4 -01 Encounter: [id number]  Primary Care Provider: No primary care provider on file     Date and time admitted to hospital: 5/9/2022  5:42 AM        Admission Date: 5/9/2022       Discharge Date:  05/13/2022        Primary Diagnoses  Principal Problem (Resolved):    Sepsis ([facility] )  Active Problems:    Multiple myeloma ([facility] )    GERD (gastroesophageal reflux disease)    Thrombocytopenia (HCC)    Ambulatory dysfunction    Chronic respiratory failure (HCC)    Type 2 diabetes mellitus without complication, without long-term current use of insulin (HCC)    Continuous opioid dependence ([facility] )  Resolved Problems:    MIK (acute kidney injury) ([facility] )    SVT (supraventricular tachycardia) ([place] [address] )    Acute encephalopathy    Hypokalemia      Hospital Course by problem:  * Sepsis (HCC)-resolved as of 5/13/2022  Assessment & Plan  · Patient is a 50-year-old female with past medical history significant for advanced multiple myeloma, who was recently discharged from [facility] and presented to the ER from a rehab center  ·   · On admission she met sepsis criteria with fever, tachycardia and leukocytosis  · Procalcitonin found to be elevated  · Sepsis is likely multifactorial, secondary to UTI and possible aspiration pneumonia  · Urinalysis consistent with acute UTI- urine culture growing E coli and Enterococcus:  Patient was transitioned to Augmentin  · Chest x-ray with evidence of possible left lower lobe opacity, and patient at risk for aspiration pneumonia:  · She was initially placed on antibiotics with cefepime/vancomycin and will be discharged on a course of Augmentin  · MRSA negative,   · Blood culture negative at 72 hours  · Negative COVID, influenza, RSV PCR      Multiple myeloma ([facility] )  Assessment & Plan  · Patient has a history multiple myeloma initially diagnosed in 2016  · She is followed by her oncologist at [facility]   · She has been on several courses of chemotherapy, and had a stem-cell transplant, and has not received remission  · During her most recent hospitalization 4/22 at [facility] oncology team noted that her condition had progressed and she would not be offered additional treatment   · Patient has required multiple transfusions in the past:  Her her oncology team her platelet transfusion threshold is less than 10, and hemoglobin less than 7  · Patient was transfused 1 unit of packed red blood cells and 1 unit of platelets during her hospitalization  · Prior to discharge her hemoglobin is 7 6, and her platelets are 80  · Continue outpatient hematology-oncology follow-up  · Patient is also followed by [facility]    Continuous opioid dependence ([facility] )  Assessment & Plan  · Patient with chronic pain secondary to multiple myeloma, with continuous opioid dependence  · Home regimen 15 mg MS Contin b i d  And morphine 10 mg p o  Q 6 hours p r n  · Given acute encephalopathy, doses were decreased  · Patient currently on 15 mg MS Contin b i d  And morphine 5 mg q 6 hours p r n  · Did have some symptoms of nausea and diarrhea during hospitalization, doing better back on home meds   · Seen in consult by palliative care   · Will continue current regimen  · As she is being discharged to a short-term rehab facility she was provided paper prescriptions at discharge  The PA- website was queried and there are no red flags  It was noted that patient is not chronically on lorazepam and this medication was not continued    She did not require any significant doses during her hospitalization        Type 2 diabetes mellitus without complication, without long-term current use of insulin [facility])  Assessment & Plan  Lab Results   Component Value Date    HGBA1C 6 2 (H) 05/09/2022       Recent Labs     05/12/22  2310 05/13/22  0712 05/13/22  1202 05/13/22  1627   POCGLU 109 70 126 79       Blood Sugar Average: Last 72 hrs:  (P) 515 [id number]   · Patient has history of diabetes type 2, without long-term use of insulin, without complication  · Her oral metformin was held during her hospitalization  · She was monitored with Sliding scale insulin coverage with Accu-Cheks  · Due to borderline blood sugars will not recommend restarting metformin at discharge    Continue Accu-Cheks and sliding scale as needed at her rehab center    Chronic respiratory failure [facility])  Assessment & Plan  · Patient has history of chronic respiratory failure with hypoxia, secondary to COPD  · maintained on 3 L nasal cannula oxygen   · Initially was requiring 4 L nasal cannula in the emergency room but able to be weaned down to her home 3 L nasal cannula   · Continue home inhalers and nebulizers    Ambulatory dysfunction  Assessment & Plan  · Patient was admitted from her rehab center  · Seen in consult by Physical and Occupational therapy recommended rehab placement  · Plan for discharge back to rehab     Thrombocytopenia ([address] )  Assessment & Plan  · Patient's history of thrombocytopenia to multiple myeloma  · Patient was transfused 1 group of platelets during her hospitalization for platelet count of 8 with a follow-up platelet count of 80  · Continue outpatient follow-up with her primary oncology team at [facility]    GERD (gastroesophageal reflux disease)  Assessment & Plan  · Continue daily PPI    Hypokalemia-resolved as of 5/13/2022  Assessment & Plan  · Potassium 2 5 at time of admission with magnesium 0 8  · Both have been repleted throughout hospitalization      Acute encephalopathy-resolved as of 5/13/2022  Assessment & Plan  · Patient presented with acute encephalopathy, was noted to be markedly lethargic  · This is most likely secondary to her sepsis  · Her narcotic regimen was decreased due to concerns over her sedation  · Patient markedly improved, is currently awake and alert   · Continue antibiotics for sepsis    SVT (supraventricular tachycardia) (HCC)-resolved as of 5/13/2022  Assessment & Plan  · Patient did have evidence of SVT while in the emergency room with heart rate in the 180s  · Patient received adenosine and IV metoprolol and started on Cardizem drip which has since been discontinued  · SVT was likely precipitated by sepsis  · Patient was continued on a beta-blocker: outpatient on lopressor 100 mg b i d , however due to hypotension her dose was decreased to 50 mg b i d  At discharge    MIK (acute kidney injury) (HCC)-resolved as of 5/13/2022  Assessment & Plan  · Creatinine was found to be elevated at 1 2 at time of admission  · Resolved to 0 8, her baseline      Pressure ulcer of right buttock:  Unstageable  Unclear if present on admission    UTI, present on admission:  Improving with antibiotic    Possible aspiration pneumonia:  Present on admission  Improving with antibiotic  ----------------------------------------------------     Service:  [facility], [facility]  Consulting Providers   Palliative care:  Dr Vail Master    Procedures Performed   None    Hospital Studies:  5/13:  Chest x-ray:  Persistent left basilar infiltrate/atelectasis    5/10:  Chest x-ray:  Left lung base opacity potentially reflecting pneumonia    5/9 CT head:  No acute intracranial abnormality    5/9 chest x-ray:  Questionable opacity at the left lung base  Correlate with PA and lateral films or CT scan of the chest to exclude an infiltrate in this region      Microbiology  5/13:  Negative COVID, influenza, RSV PCR  5/9:  Blood culture:  Negative x2  5/10 MRSA nares culture:  Negative  5/9 urine culture:  Greater than 100,000 E coli, greater than 100,000 Enterococcus  5/9:  Negative COVID, influenza, RSV    Results from last 7 days   Lab Units 05/13/22  1811 05/13/22  1123 05/12/22  0837   WBC Thousand/uL 7 46 5 95 6 51   HEMOGLOBIN g/dL 7 6* 7 2* 8 2* HEMATOCRIT % 24 6* 23 0* 26 5*   PLATELETS Thousands/uL 80* 8* 18*     Results from last 7 days   Lab Units 05/12/22  0453 05/11/22  0619 05/10/22  1424   POTASSIUM mmol/L 4 0 3 0* 3 2*   CHLORIDE mmol/L 111* 112* 115*   CO2 mmol/L 23 25 26   BUN mg/dL 9 8 10   CREATININE mg/dL 0 81 0 80 1 05   CALCIUM mg/dL 9 6 9 1 9 1       History and Physical Exam:  Please refer to the Admission H&P note    Discharge Condition: Improved  Discharge Disposition:  Short-term rehab    Discharge Note and Physical Exam:   Patient was examined twice today, in the morning, and again in the evening prior to discharge  She denies any complaints  She denies any current cough  She denies any shortness of breath  She denies any chest congestion or phlegm  She denies any pain anywhere  She denies any abdominal pain  Denies any nausea, vomiting, diarrhea  Is tolerating p o  Denies any dizziness or lightheadedness  She notes she feels better  Vitals:    05/13/22 1623   BP: 100/53   Pulse: 95   Resp: 16   Temp: (!) 97 2 °F (36 2 °C)   SpO2:      General:  Pleasant, non-tachypnic, non-dyspnic  Conversant  Heart: Regular rate and rhythm, S1S2 present  No murmur, rub or gallop  Lungs:  Faint left lower lobe crackles  No wheezing or rhonchi  Good air movement  No accessory muscle use or respiratory distress  Abdomen: soft, non-tender, non-distended, NABS  No rebound or guarding  No mass or peritoneal signs  Extremities: no clubbing, cyanosis or edema  2+ pedal pulses bilaterally  Neurologic:  Awake alert  Interactive  Follows commands  Moving all 4 extremities      Discharge Medications   Please see Medical Reconciliation Discharge Form    Discharge Follow Up Appointments:   PCP at [facility]  Dr Patel David, [facility] AT THE [facility] Oncology: 1 week    Discharge  Statement   Total Time Spent today including physical exam, discussion with patient and discharge arrangements/care = 45  Minutes      Discussed with patient's nurse  Discussed with case manager  Called Dr Dayan Cagle office:  Confirm platelet threshold is less than 10  Called patient's , given update  Reviewed all test results and treatment plan    Discussed discharge plan    This note has been constructed using a voice recognition system

## 2022-05-13 NOTE — RESTORATIVE TECHNICIAN NOTE
Restorative Technician Note      Patient Name: Yin Daley     Restorative Tech Visit Date: 5/13/2022  Note Type: Mobility  Patient Position Upon Consult: Supine  Activity Performed: Ambulated; Transferred (amb to her side chair utilizing RW)  Assistive Device: Roller walker  Patient Position at End of Consult: Bedside chair;  All needs within reach; Bed/Chair alarm activated

## 2022-05-13 NOTE — CASE MANAGEMENT
Case Management Discharge Planning Note    Patient name Verónica Rincon  Location [address] [place] [address] /[address] 462-* MRN [id number]  : 1955 Date 2022       Current Admission Date: 2022  Current Admission Diagnosis:Sepsis [facility])   Patient Active Problem List    Diagnosis Date Noted    Continuous opioid dependence ([facility] ) 05/10/2022    Acute encephalopathy 05/10/2022    Hypokalemia 05/10/2022    MIK (acute kidney injury) ([facility] ) 2022    SVT (supraventricular tachycardia) ([facility] ) 2022    Chronic respiratory failure ([facility] ) 2022    Type 2 diabetes mellitus ([facility] ) 2022    Sepsis ([facility] ) 2022    Pressure injury of right buttock, unstageable ([facility] ) 2022    Pressure injury of left buttock, stage 2 ([facility] ) 2022    Ambulatory dysfunction 2022    Multifocal pneumonia 2019    Multiple myeloma ([facility] ) 2019    Hypertension 2019    GERD (gastroesophageal reflux disease) 2019    Thrombocytopenia ([facility] ) 2019    Asthma 2019      LOS (days): 4  Geometric Mean LOS (GMLOS) (days): 4 80  Days to GMLOS:0 6     OBJECTIVE:  Risk of Unplanned Readmission Score: 30 83         Current admission status: Inpatient   Preferred Pharmacy:   [facility], [address] [address]  Phone: [phone] Fax: [phone]    Primary Care Provider: No primary care provider on file  Primary Insurance: MEDICARE  Secondary Insurance:     DISCHARGE DETAILS:          Comments - Freedom of Choice: MD is planning on discharging pt back to [facility] and they can accept back  Pt is not a MA bedhold  COVID was neg  Completed Med Nec form and put on chart  Made a referral for S                                                 Transport at Discharge : [facility]  Dispatcher Contacted: Yes        ETA of Transport (Date): 22

## 2022-05-13 NOTE — ASSESSMENT & PLAN NOTE
· Patient with chronic pain secondary to multiple myeloma, with continuous opioid dependence  · Home regimen 15 mg MS Contin b i d  And morphine 10 mg p o  Q 6 hours p r n  · Given acute encephalopathy, doses were decreased  · Patient currently on 15 mg MS Contin b i d  And morphine 5 mg q 6 hours p r n  · Did have some symptoms of nausea and diarrhea during hospitalization, doing better back on home meds   · Seen in consult by palliative care   · Will continue current regimen  · As she is being discharged to a short-term rehab facility she was provided paper prescriptions at discharge  The [place]- website was queried and there are no red flags  It was noted that patient is not chronically on lorazepam and this medication was not continued    She did not require any significant doses during her hospitalization

## 2022-05-13 NOTE — ASSESSMENT & PLAN NOTE
· Patient was admitted from her rehab center  · Seen in consult by Physical and Occupational therapy recommended rehab placement  · Plan for discharge back to rehab

## 2022-05-13 NOTE — SPEECH THERAPY NOTE
Speech Language/Pathology  Pt seen in am  Alert but slow to respond at times  Upgraded diet to dysphagia 3 and thin  Returned at lunch  Minimal intake  Had mostly mashed potatoes and only a few bites of the pasta  Denied any dififuclty w/ PO  Denied nausea  States she is not hungry/has no appeitie  Offered to get her something else  She declined  Continue diet as tolerated

## 2022-05-13 NOTE — ASSESSMENT & PLAN NOTE
· Patient is a 65-year-old female with past medical history significant for advanced multiple myeloma, who was recently discharged from [facility] and presented to the ER from a rehab center  ·   · On admission she met sepsis criteria with fever, tachycardia and leukocytosis  · Procalcitonin found to be elevated  · Sepsis is likely multifactorial, secondary to UTI and possible aspiration pneumonia  · Urinalysis consistent with acute UTI- urine culture growing E coli and Enterococcus:  Patient was transitioned to Augmentin  · Chest x-ray with evidence of possible left lower lobe opacity, and patient at risk for aspiration pneumonia:  · She was initially placed on antibiotics with cefepime/vancomycin and will be discharged on a course of Augmentin  · MRSA negative,   · Blood culture negative at 72 hours  · Negative COVID, influenza, RSV PCR

## 2022-05-13 NOTE — ASSESSMENT & PLAN NOTE
· Patient presented with acute encephalopathy, was noted to be markedly lethargic  · This is most likely secondary to her sepsis  · Her narcotic regimen was decreased due to concerns over her sedation  · Patient markedly improved, is currently awake and alert    · Continue antibiotics for sepsis

## 2022-05-13 NOTE — NURSING NOTE
This nurse was only assigned to this pt for 4hrs  Agree with previous RN's assessment  Attempted placement of new IV X 2, unsuccessfully  Old access maintained  Will hand off to next RN

## 2022-05-13 NOTE — ASSESSMENT & PLAN NOTE
· Patient has a history multiple myeloma initially diagnosed in 2016  · She is followed by her oncologist at [facility]   · She has been on several courses of chemotherapy, and had a stem-cell transplant, and has not received remission    · During her most recent hospitalization 4/22 at [facility] oncology team noted that her condition had progressed and she would not be offered additional treatment   · Patient has required multiple transfusions in the past:  Her her oncology team her platelet transfusion threshold is less than 10, and hemoglobin less than 7  · Patient was transfused 1 unit of packed red blood cells and 1 unit of platelets during her hospitalization  · Prior to discharge her hemoglobin is 7 6, and her platelets are 80  · Continue outpatient hematology-oncology follow-up  · Patient is also followed by [facility] Palliative care

## 2022-05-13 NOTE — ASSESSMENT & PLAN NOTE
· Patient did have evidence of SVT while in the emergency room with heart rate in the 180s  · Patient received adenosine and IV metoprolol and started on Cardizem drip which has since been discontinued  · SVT was likely precipitated by sepsis  · Patient was continued on a beta-blocker: outpatient on lopressor 100 mg b i d , however due to hypotension her dose was decreased to 50 mg b i d   At discharge

## 2022-05-13 NOTE — ASSESSMENT & PLAN NOTE
· Patient has history of chronic respiratory failure with hypoxia, secondary to COPD  · maintained on 3 L nasal cannula oxygen   · Initially was requiring 4 L nasal cannula in the emergency room but able to be weaned down to her home 3 L nasal cannula   · Continue home inhalers and nebulizers

## 2022-05-13 NOTE — CASE MANAGEMENT
Case Management Discharge Planning Note    Patient name Alberto Ram  Location [place] [address] /[address] 462-* MRN [id number]  : 1955 Date 2022       Current Admission Date: 2022  Current Admission Diagnosis:Sepsis [facility])   Patient Active Problem List    Diagnosis Date Noted    Continuous opioid dependence ([facility] ) 05/10/2022    Acute encephalopathy 05/10/2022    Hypokalemia 05/10/2022    MIK (acute kidney injury) ([facility] ) 2022    SVT (supraventricular tachycardia) ([facility] ) 2022    Chronic respiratory failure ([facility] ) 2022    Type 2 diabetes mellitus ([facility] ) 2022    Sepsis ([facility] ) 2022    Pressure injury of right buttock, unstageable ([facility] ) 2022    Pressure injury of left buttock, stage 2 ([facility] ) 2022    Ambulatory dysfunction 2022    Multifocal pneumonia 2019    Multiple myeloma ([facility] ) 2019    Hypertension 2019    GERD (gastroesophageal reflux disease) 2019    Thrombocytopenia ([facility] ) 2019    Asthma 2019      LOS (days): 4  Geometric Mean LOS (GMLOS) (days): 4 80  Days to GMLOS:0 5     OBJECTIVE:  Risk of Unplanned Readmission Score: 30 83         Current admission status: Inpatient   Preferred Pharmacy:   [facility], [address] [address]  Phone: [phone] Fax: [phone]    Primary Care Provider: No primary care provider on file  Primary Insurance: MEDICARE  Secondary Insurance:     DISCHARGE DETAILS:          Comments - Freedom of Choice: [facility]LS will  at [address]  RN, MD, SNF and family aware of pick time  Transported by [employer] and Unit #): [facility]     ETA of Transport (Time):               IMM Given (Date):: 22  IMM Given to[de-identified] Patient  Family notified[de-identified] Read IMM, copy given and copy put in bin to be scan

## 2022-05-14 LAB
ABO GROUP BLD BPU: NORMAL
ABO GROUP BLD BPU: NORMAL
BACTERIA BLD CULT: NORMAL
BACTERIA BLD CULT: NORMAL
BPU ID: NORMAL
BPU ID: NORMAL
UNIT DISPENSE STATUS: NORMAL
UNIT DISPENSE STATUS: NORMAL
UNIT PRODUCT CODE: NORMAL
UNIT PRODUCT CODE: NORMAL
UNIT PRODUCT VOLUME: 300 ML
UNIT PRODUCT VOLUME: 490 ML
UNIT RH: NORMAL
UNIT RH: NORMAL

## 2023-01-21 NOTE — SPEECH THERAPY NOTE
Speech Language/Pathology  Speech/Language Pathology  Assessment    Patient Name: Ashanti Brown  PYUPF'R Date: 5/9/2022     Problem List  Principal Problem:    Sepsis ([facility] )  Active Problems:    Multiple myeloma ([place] [address] )    GERD (gastroesophageal reflux disease)    Thrombocytopenia ([facility])    Ambulatory dysfunction    MIK (acute kidney injury) ([facility] )    SVT (supraventricular tachycardia) ([facility])    Chronic respiratory failure ([facility])    Type 2 diabetes mellitus ([place] [address] )    Past Medical History  Past Medical History:   Diagnosis Date    Anemia     Asthma     Chronic constipation     Chronic pain     COPD (chronic obstructive pulmonary disease) ([facility])     CTS (carpal tunnel syndrome)     DJD (degenerative joint disease)     GERD (gastroesophageal reflux disease)     Hypertension     Multiple myeloma ([facility])     SAMIR (obstructive sleep apnea)     Osteonecrosis ([place] [address] )     Sciatica     Thoracic aortic aneurysm ([facility])      Past Surgical History  Past Surgical History:   Procedure Laterality Date    BONE MARROW BIOPSY      COLONOSCOPY      PORTACATH PLACEMENT      REDUCTION MAMMAPLASTY      VERTEBROPLASTY          Bedside Swallow Evaluation:    Summary:  Pt presented awake and responded to ?'s w/ some word-finding difficulty noted  Speaks both [address]  and [address] fluently but prefers Faroese  Instead of telling me the number of children she had, she states " a girl, then a boy, then a girl"  Dozed off w/out stim  No lingual or facial asymmetry  Upper and lower dentures in  Tolerated sips of water and 8 oz juice by straw w/ prompt transfer and swallow and no s/s aspiration  Also had 4 ounces applesauce w/ same  Trialed solid  Gave pt a small bite of hard cookie  Munch like mastication to begin , I asked if it was too big  She nodded yes  Mastication then improved  She then refused any more solid and just wanted to drink  Control a nd trasnfer were WNL  No oral residue  Mild/mod oral stage, no pharyngeal s/s  Recommendations:  Diet: dysphagia 2 mech soft for now  Liquid: thin  Meds:as tolerated  Supervision: full for now  Positioning:Upright  Strategies: Pt to take PO/Meds only when fully alert and upright  Oral care  Aspiration precautions  Reflux precautions  Therapy Prognosis: unknown  Prognosis considerations:w/u in progress  Frequency: 2-5x/wk    Consider consult w/:  Rehab  Neurology? ?  CT pending  Nutrition    H&P/Admit info/ pertinent provider notes: (PMH noted above)  Chief Complaint:   Fever and tachycardia  History of Present Illness:  Yin Daley is a 78 y o  female with pmhx of type 2 diabetes, multiple myeloma, chronic costello catheter, chronic respiratory failure due to copd requiring 3 liters of oxygen [facility] fever and elevated heart rate  In the Ed she was found to have a temp of 101 and heart rate in the 150s  There was concern for uti in which she was given ceftriaxone and costello was exchanged  Pt then had a heart rate of 180s and was in svt  She was given adenosine of 12mg, metoprolol 10mg and started on cardizem gtt  Pt is currently in sinus tach at 107  She denies any shortness breath, cough, sore throat, n/v/d or abdominal pain  Special Studies:  IMPRESSION: cxr  Questionable opacity at the left lung base  Correlate with PA and lateral films or CT scan of the chest to exclude an infiltrate in this region  CT pending    Previous VBS:  None known    Goal(s):  Dysphagia LTG  -Patient will demonstrate safe and effective oral intake (without overt s/s significant oral/pharyngeal dysphagia including s/s penetration or aspiration) for the highest appropriate diet level  1 Pt will tolerate least restrictive diet w/out s/s aspiration or oral/pharyngeal difficulties  2 Pt will will effectively masticate and transfer solids w/out s/s dysphagia/aspiration  3 Pt will tolerate thin liquids w/out s/s aspiration       Patient's goal: wanted orange juice    Did the pt report pain? no  If yes, was nursing notified/was it addressed? na    Reason for consult:  R/o aspiration  Determine safest and least restrictive diet  Failed nursing dysphagia assessment  Change in mental status  New neuro event  Stroke protocol  H/o neurological disease  Food allergies:  none  Current diet:  regular  Premorbid diet[de-identified]  Regular ccl  O2 requirement:  none  Voice/Speech:  Comprehension grossly intact, + word-finding diffiuclties  Social:  [place] for rehab?   Follows commands:  basic                  Cognitive Status:  Dozes if not spoken to  Oral [facility] exam:  Dentition: upper and lower dentures  Labial strength and ROM:wnl  Lingual strength and ROM:wnl  Mandibular strength and ROM:wnl  Secretion management:wnl    Esophageal stage:  H/o GERD    Aspiration precautions posted    Results d/w:  Pt, nursing, physician Patient/Caregiver provided printed discharge information.

## 2023-09-15 NOTE — QUICK NOTE
Alerted by pts nurse that pts hemoglobin result as 6 7 and platelets 19  Likely due to multiple myeloma  Went to consent pt but she had told her nurse she wanted to sleep and not to bother her any more  Pt woke up and would answer some questions and went right back to sleep  She would not sign consent  Called her  and was able to update him  Did discuss her current mental status with her  he reports "she gets that way when she doesn't want to be bothered"   He consented for the blood and stated "she gets blood all the time for her multiple myeloma " Cryotherapy Text: The wound bed was treated with cryotherapy after the biopsy was performed.